# Patient Record
Sex: MALE | Race: BLACK OR AFRICAN AMERICAN | NOT HISPANIC OR LATINO | Employment: UNEMPLOYED | URBAN - METROPOLITAN AREA
[De-identification: names, ages, dates, MRNs, and addresses within clinical notes are randomized per-mention and may not be internally consistent; named-entity substitution may affect disease eponyms.]

---

## 2017-09-05 ENCOUNTER — OFFICE VISIT (OUTPATIENT)
Dept: OTOLARYNGOLOGY | Facility: CLINIC | Age: 62
End: 2017-09-05
Payer: MEDICAID

## 2017-09-05 VITALS — HEART RATE: 87 BPM | SYSTOLIC BLOOD PRESSURE: 145 MMHG | DIASTOLIC BLOOD PRESSURE: 86 MMHG | WEIGHT: 186.75 LBS

## 2017-09-05 DIAGNOSIS — J33.9 NASAL POLYPOSIS: Primary | ICD-10-CM

## 2017-09-05 PROCEDURE — 99203 OFFICE O/P NEW LOW 30 MIN: CPT | Mod: PBBFAC | Performed by: OTOLARYNGOLOGY

## 2017-09-05 PROCEDURE — 99999 PR PBB SHADOW E&M-NEW PATIENT-LVL III: CPT | Mod: PBBFAC,,, | Performed by: OTOLARYNGOLOGY

## 2017-09-05 PROCEDURE — 99204 OFFICE O/P NEW MOD 45 MIN: CPT | Mod: S$PBB,,, | Performed by: OTOLARYNGOLOGY

## 2017-09-05 RX ORDER — FLUTICASONE PROPIONATE 50 MCG
2 SPRAY, SUSPENSION (ML) NASAL DAILY
Qty: 2 BOTTLE | Refills: 3 | Status: SHIPPED | OUTPATIENT
Start: 2017-09-05 | End: 2017-12-04

## 2017-09-05 NOTE — PROGRESS NOTES
Chief Complaint   Patient presents with    Consult     trouble swallowing/ smelling/breating         62 y.o. male presents with longstanding history of loss of smell starting in 2006. Had a nasal surgery 8 years ago at [facility] for polyps. Was doing better but has noted increased nasal congestion and inability to breathe through his nose. He also has some coughing. No allergy to aspirin or asthma diagnosis. Currently on Flonase.      Review of Systems     Constitutional: Negative for fatigue and unexpected weight change.   HENT: per HPI.   Eyes: Negative for visual disturbance.   Respiratory: Negative for shortness of breath, hemoptysis  Cardiovascular: Negative for chest pain and palpitations.   Genitourinary: Negative for dysuria and difficulty urinating.   Musculoskeletal: Negative for decreased ROM, back pain.   Skin: Negative for rash, sunburn, itching.   Neurological: Negative for dizziness and seizures.   Hematological: Negative for adenopathy. Does not bruise/bleed easily.   Psychiatric/Behavioral: Negative for agitation. The patient is not nervous/anxious.   Endocrine: Negative for rapid weight loss/weight gain, heat/cold intolerance.     History reviewed. No pertinent past medical history.    History reviewed. No pertinent surgical history.    family history is not on file.    Pt  reports that he has never smoked. He has never used smokeless tobacco.    Review of patient's allergies indicates:  No Known Allergies     Physical Exam    Vitals:    09/05/17 1403   BP: (!) 145/86   Pulse: 87     There is no height or weight on file to calculate BMI.    Physical Exam   Constitutional: He is oriented to person, place, and time. He appears well-developed and well-nourished. No distress.   HENT:   Head: Normocephalic and atraumatic.   Right Ear: Hearing, tympanic membrane, external ear and ear canal normal. Tympanic membrane mobility is normal. No middle ear effusion. No decreased hearing is noted.   Left Ear: Hearing,  tympanic membrane, external ear and ear canal normal. Tympanic membrane mobility is normal.  No middle ear effusion. No decreased hearing is noted.   Nose: Nose normal. Right sinus exhibits no maxillary sinus tenderness and no frontal sinus tenderness. Left sinus exhibits no maxillary sinus tenderness and no frontal sinus tenderness.   Mouth/Throat: Oropharynx is clear and moist.   Bilateral nasal cavities with polypoid change extending posteriorly. No purulent fluid seen.   Eyes: Conjunctivae, EOM and lids are normal. Pupils are equal, round, and reactive to light. Right eye exhibits no discharge. Left eye exhibits no discharge.   Neck: Trachea normal, normal range of motion and phonation normal. Neck supple. No tracheal tenderness present. No tracheal deviation, no edema and no erythema present. No thyroid mass and no thyromegaly present.   Cardiovascular: Normal heart sounds.    Pulmonary/Chest: Breath sounds normal. No stridor.   Abdominal: Soft.   Lymphadenopathy:     He has no cervical adenopathy.   Neurological: He is alert and oriented to person, place, and time.   Skin: Skin is warm and dry. No rash noted. He is not diaphoretic. No erythema. No pallor.   Psychiatric: He has a normal mood and affect.   Nursing note and vitals reviewed.        Assessment     1. Nasal polyposis          Plan  In summary, Mr. Hansen is a 62 year old male with history of nasal polyposis. Discussed the nature of nasal polyps. Will obtain CT sinus and continue Flonase. Recommend he follow up with my colleague Dr. Botello for further evaluation once CT sinus complete.

## 2017-09-06 ENCOUNTER — HOSPITAL ENCOUNTER (OUTPATIENT)
Dept: RADIOLOGY | Facility: HOSPITAL | Age: 62
Discharge: HOME | End: 2017-09-06
Attending: OTOLARYNGOLOGY
Payer: MEDICAID

## 2017-09-06 DIAGNOSIS — J33.9 NASAL POLYPOSIS: ICD-10-CM

## 2017-09-06 PROCEDURE — 70486 CT MAXILLOFACIAL W/O DYE: CPT | Mod: 26,,, | Performed by: RADIOLOGY

## 2017-09-06 PROCEDURE — 70486 CT MAXILLOFACIAL W/O DYE: CPT | Mod: TC

## 2017-09-11 ENCOUNTER — HOSPITAL ENCOUNTER (OUTPATIENT)
Dept: CARDIOLOGY | Facility: CLINIC | Age: 62
Discharge: HOME | End: 2017-09-11
Payer: MEDICAID

## 2017-09-11 ENCOUNTER — HOSPITAL ENCOUNTER (OUTPATIENT)
Dept: RADIOLOGY | Facility: HOSPITAL | Age: 62
Discharge: HOME | End: 2017-09-11
Attending: OTOLARYNGOLOGY
Payer: MEDICAID

## 2017-09-11 ENCOUNTER — OFFICE VISIT (OUTPATIENT)
Dept: OTOLARYNGOLOGY | Facility: CLINIC | Age: 62
End: 2017-09-11
Payer: MEDICAID

## 2017-09-11 VITALS
TEMPERATURE: 97 F | WEIGHT: 187.81 LBS | DIASTOLIC BLOOD PRESSURE: 83 MMHG | HEART RATE: 111 BPM | SYSTOLIC BLOOD PRESSURE: 141 MMHG

## 2017-09-11 DIAGNOSIS — J35.8 TONSILLAR MASS: Primary | ICD-10-CM

## 2017-09-11 DIAGNOSIS — J35.8 TONSILLAR MASS: ICD-10-CM

## 2017-09-11 DIAGNOSIS — J33.9 NASAL POLYPOSIS: ICD-10-CM

## 2017-09-11 PROCEDURE — 71020 XR CHEST PA AND LATERAL: CPT | Mod: 26,,, | Performed by: RADIOLOGY

## 2017-09-11 PROCEDURE — 31575 DIAGNOSTIC LARYNGOSCOPY: CPT | Mod: PBBFAC | Performed by: OTOLARYNGOLOGY

## 2017-09-11 PROCEDURE — 93010 ELECTROCARDIOGRAM REPORT: CPT | Mod: S$PBB,,, | Performed by: INTERNAL MEDICINE

## 2017-09-11 PROCEDURE — 99214 OFFICE O/P EST MOD 30 MIN: CPT | Mod: 25,S$PBB,, | Performed by: OTOLARYNGOLOGY

## 2017-09-11 PROCEDURE — 99214 OFFICE O/P EST MOD 30 MIN: CPT | Mod: PBBFAC,25 | Performed by: OTOLARYNGOLOGY

## 2017-09-11 PROCEDURE — 99999 PR PBB SHADOW E&M-EST. PATIENT-LVL IV: CPT | Mod: PBBFAC,,, | Performed by: OTOLARYNGOLOGY

## 2017-09-11 PROCEDURE — 31575 DIAGNOSTIC LARYNGOSCOPY: CPT | Mod: S$PBB,,, | Performed by: OTOLARYNGOLOGY

## 2017-09-11 PROCEDURE — 71020 XR CHEST PA AND LATERAL: CPT | Mod: TC

## 2017-09-11 PROCEDURE — 93005 ELECTROCARDIOGRAM TRACING: CPT | Mod: PBBFAC | Performed by: INTERNAL MEDICINE

## 2017-09-11 RX ORDER — LIDOCAINE HYDROCHLORIDE 10 MG/ML
1 INJECTION, SOLUTION EPIDURAL; INFILTRATION; INTRACAUDAL; PERINEURAL ONCE
Status: CANCELLED | OUTPATIENT
Start: 2017-09-11 | End: 2017-09-11

## 2017-09-11 NOTE — PROGRESS NOTES
Chief Complaint   Patient presents with    post CT         62 y.o. male presents with longstanding history of loss of smell starting in 2006. Had a nasal surgery 8 years ago at [facility] for polyps. Was doing better but has noted increased nasal congestion and inability to breathe through his nose. He also has some coughing. No allergy to aspirin or asthma diagnosis. Currently on Flonase.     No new symptoms. CT sinus reveals recurrent nasal polyps, but also demonstrates a left tonsil mass. He denies any throat or lear pain. He does report occasional cough. He has never used tobacco.       Review of Systems     Constitutional: Negative for fatigue and unexpected weight change.   HENT: per HPI.   Eyes: Negative for visual disturbance.   Respiratory: Negative for shortness of breath, hemoptysis  Cardiovascular: Negative for chest pain and palpitations.   Genitourinary: Negative for dysuria and difficulty urinating.   Musculoskeletal: Negative for decreased ROM, back pain.   Skin: Negative for rash, sunburn, itching.   Neurological: Negative for dizziness and seizures.   Hematological: Negative for adenopathy. Does not bruise/bleed easily.   Psychiatric/Behavioral: Negative for agitation. The patient is not nervous/anxious.   Endocrine: Negative for rapid weight loss/weight gain, heat/cold intolerance.     History reviewed. No pertinent past medical history.    History reviewed. No pertinent surgical history.    family history is not on file.    Pt  reports that he has never smoked. He has never used smokeless tobacco.    Review of patient's allergies indicates:  No Known Allergies     Physical Exam    Vitals:    09/11/17 0944   BP: (!) 141/83   Pulse: (!) 111   Temp: 96.9 °F (36.1 °C)     There is no height or weight on file to calculate BMI.    Physical Exam   Constitutional: He is oriented to person, place, and time. He appears well-developed and well-nourished. No distress.   HENT:   Head: Normocephalic and atraumatic.    Right Ear: Hearing, tympanic membrane, external ear and ear canal normal. Tympanic membrane mobility is normal. No middle ear effusion. No decreased hearing is noted.   Left Ear: Hearing, tympanic membrane, external ear and ear canal normal. Tympanic membrane mobility is normal.  No middle ear effusion. No decreased hearing is noted.   Nose: Nose normal. Right sinus exhibits no maxillary sinus tenderness and no frontal sinus tenderness. Left sinus exhibits no maxillary sinus tenderness and no frontal sinus tenderness.   Mouth/Throat: Uvula is midline, oropharynx is clear and moist and mucous membranes are normal.       Bilateral nasal cavities with polypoid change extending posteriorly. No purulent fluid seen.   Eyes: Conjunctivae, EOM and lids are normal. Pupils are equal, round, and reactive to light. Right eye exhibits no discharge. Left eye exhibits no discharge.   Neck: Trachea normal, normal range of motion and phonation normal. Neck supple. No tracheal tenderness present. No tracheal deviation, no edema and no erythema present. No thyroid mass and no thyromegaly present.   Cardiovascular: Normal heart sounds.    Pulmonary/Chest: Breath sounds normal. No stridor.   Abdominal: Soft.   Lymphadenopathy:     He has no cervical adenopathy.   Neurological: He is alert and oriented to person, place, and time.   Skin: Skin is warm and dry. No rash noted. He is not diaphoretic. No erythema. No pallor.   Psychiatric: He has a normal mood and affect.   Nursing note and vitals reviewed.    Procedure: Flexible laryngoscopy  In order to fully examine the upper aerodigestive tract, including the larynx, in a patient with a hyperactive gag reflex, flexible endoscopy is required.  After explaining the procedure and obtaining verbal consent, a timeout was performed with the patient's participation according to the universal protocol. Both nasal cavities were anesthetized with 4% Xylocaine spray mixed with Matthias-Synephrine. The  flexible laryngoscope was inserted into the nasal cavity and advanced to visualize the nasal cavity, nasopharynx, the posterior oropharynx, hypopharynx, and the endolarynx with the above findings noted. The scope was removed and the procedure terminated. The patient tolerated this procedure well without apparent complication.     Nasopharynx - the torus is clear. There are no lesions of the posterior wall.   Oropharynx - no lesions of the tongue base. There is fullness of the left tonsil.  Hypopharynx - there are no lesions of the pyriform sinuses or postcricoid region    Larynx - there are no lesions of the supraglottic or glottic larynx. Vocal fold mobility is normal with complete closure.     Studies reviewed:  CT Sinus 9/6/17-  Impression         1) Extensive opacification of the nasal sinuses with polypoid opacity extending into the nasal cavity as described above. 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    2) Postsurgical changes of functional endoscopic sinus surgery. The left medial antrostomy is narrowed but patent. Right is completely opacified.    3) 3.1 cm soft tissue mass at the level of the left palatine tonsil, incompletely visualized and not well characterized on this noncontrasted study, but concerning for malignancy. Further evaluation with direct visualization is advised and further imaging with contrast enhanced CT if warranted.       Assessment     1. Tonsillar mass    2. Nasal polyposis          Plan  In summary, Mr. Hansen is a 62 year old male with history of nasal polyposis. Recent CT sinus with evidence of recurrent disease as well as left tonsil mass. Will obtain dedicated CT neck for better assessment. May be vascular in nature given its appearance. Also discussed possible need for biopsy in the OR depending on CT results to rule out malignancy. Risks, benefits, and alternatives went over with the patient of DL, Bx, possible  tonsillectomy. Risks include, but are not limited to, pain, bleeding, infection, tongue injury and numbness, as well as injury to the structures of the oral cavity and upper aerodigestive tract. All questions were answered and he is in agreement with the plan.

## 2017-09-12 ENCOUNTER — HOSPITAL ENCOUNTER (OUTPATIENT)
Dept: RADIOLOGY | Facility: HOSPITAL | Age: 62
Discharge: HOME | End: 2017-09-12
Attending: OTOLARYNGOLOGY
Payer: MEDICAID

## 2017-09-12 DIAGNOSIS — J35.8 TONSILLAR MASS: ICD-10-CM

## 2017-09-12 PROCEDURE — 70491 CT SOFT TISSUE NECK W/DYE: CPT | Mod: 26,,, | Performed by: RADIOLOGY

## 2017-09-12 PROCEDURE — 25500020 PHARM REV CODE 255: Performed by: OTOLARYNGOLOGY

## 2017-09-12 PROCEDURE — 70491 CT SOFT TISSUE NECK W/DYE: CPT | Mod: TC

## 2017-09-12 RX ADMIN — IOHEXOL 75 ML: 350 INJECTION, SOLUTION INTRAVENOUS at 05:09

## 2017-09-14 ENCOUNTER — TELEPHONE (OUTPATIENT)
Dept: OTOLARYNGOLOGY | Facility: CLINIC | Age: 62
End: 2017-09-14

## 2017-09-14 ENCOUNTER — ANESTHESIA EVENT (OUTPATIENT)
Dept: SURGERY | Facility: HOSPITAL | Age: 62
End: 2017-09-14
Payer: MEDICAID

## 2017-09-14 NOTE — ANESTHESIA PREPROCEDURE EVALUATION
09/14/2017  [facility]  Anesthesia Pre-Operative Evaluation         Patient Name: Saba Hansen  YOB: 1955  MRN: [id number]    SUBJECTIVE:     Pre-operative evaluation for Procedure(s) (LRB):  LARYNGOSCOPY BRONCHOSCOPY-DIRECT (N/A)  TONSILLECTOMY (Bilateral)     09/14/2017    Saba Hansen is a 62 y.o. male w/ tonsilar mass who presents for the above procedure.  PMH: patient new to [facility].  Denies medical problems other than nasal congestion and mild asthma with no recent episodes. No inhaler use.  Prev airway: None documented.    There is no problem list on file for this patient.    Review of patient's allergies indicates:  No Known Allergies    Current Inpatient Medications:      No current facility-administered medications on file prior to encounter.      Current Outpatient Prescriptions on File Prior to Encounter   Medication Sig Dispense Refill    fluticasone (FLONASE) 50 mcg/actuation nasal spray 2 sprays by Each Nare route once daily. 2 Bottle 3       History reviewed. No pertinent surgical history.    Social History     Social History    Marital status: Single     Spouse name: N/A    Number of children: N/A    Years of education: N/A     Occupational History    Not on file.     Social History Main Topics    Smoking status: Never Smoker    Smokeless tobacco: Never Used    Alcohol use Not on file    Drug use: Unknown    Sexual activity: Not on file     Other Topics Concern    Not on file     Social History Narrative    No narrative on file       OBJECTIVE:     Vital Signs Range (Last 24H):         CBC:   No results for input(s): WBC, RBC, HGB, HCT, PLT, MCV, MCH, MCHC in the last 72 hours.    CMP: No results for input(s): NA, K, CL, CO2, BUN, CREATININE, GLU, MG, PHOS, CALCIUM, ALBUMIN, PROT, ALKPHOS, ALT, AST, BILITOT in the last 72  09/30/21        [address] [place] [address] [place] [id number]      Dear Yulissa Hernandez records indicate that you have outstanding lab work and or testing that was ordered for you and has not yet been completed:  Orders Placed This Encounter hours.    INR:  No results for input(s): INR, PROTIME, APTT in the last 72 hours.    Invalid input(s): PT    Diagnostic Studies:   CT soft tissue neck 9/12/17:  Ill-defined 2.9 cm area of soft tissue lesion in the left tonsillar area, similar to CT 09/06/2017, concerning for squamous cell carcinoma with an ipsilateral 1.3 cm level III cervical node, could indicate regional spread of tumor.     Extensive sinus disease compatible with allergic fungal sinusitis and associated sinonasal polyposis as seen on recent CT sinus study.    EKG:   Normal sinus rhythm  Right atrial enlargement  Borderline Abnormal ECG  No previous ECGs available  Confirmed by Nikkie Montemayor MD ([id number]) on 9/11/2017 3:41:39 PM    ASSESSMENT/PLAN:         Anesthesia Evaluation    I have reviewed the Patient Summary Reports.    I have reviewed the Nursing Notes.   I have reviewed the Medications.     Review of Systems  Anesthesia Hx:  No problems with previous Anesthesia Denies Hx of Anesthetic complications  Neg history of prior surgery. Denies Family Hx of Anesthesia complications.   Denies Personal Hx of Anesthesia complications.   Social:  Non-Smoker    Hematology/Oncology:  Hematology Normal   Oncology Normal     EENT/Dental:   chronic allergic rhinitis Tonsillar mass   Cardiovascular:  Cardiovascular Normal  ECG has been reviewed.    Pulmonary:   Asthma mild    Renal/:  Renal/ Normal     Hepatic/GI:  Hepatic/GI Normal    Musculoskeletal:  Musculoskeletal Normal    Neurological:  Neurology Normal    Endocrine:  Endocrine Normal    Dermatological:  Skin Normal    Psych:  Psychiatric Normal           Physical Exam  General:  Well nourished    Airway/Jaw/Neck:  Airway Findings: Mouth Opening: Normal Tongue: Normal  General Airway Assessment: Good  Mallampati: II  Improves to II with phonation.  TM Distance: Normal, at least 6 cm  Jaw/Neck Findings:  Neck ROM: Normal ROM      Dental:  Dental Findings: Periodontal disease, Mild     Chest/Lungs:  Chest/Lungs Findings: Clear to auscultation, Normal Respiratory Rate     Heart/Vascular:  Heart Findings: Rate: Normal  Rhythm: Regular Rhythm  Sounds: Normal        Mental Status:  Mental Status Findings:  Cooperative, Alert and Oriented         Anesthesia Plan  Type of Anesthesia, risks & benefits discussed:  Anesthesia Type:  general  Patient's Preference:   Intra-op Monitoring Plan: standard ASA monitors  Intra-op Monitoring Plan Comments:   Post Op Pain Control Plan: multimodal analgesia  Post Op Pain Control Plan Comments:   Induction:   IV  Beta Blocker:  Patient is not currently on a Beta-Blocker (No further documentation required).       Informed Consent: Patient understands risks and agrees with Anesthesia plan.  Questions answered. Anesthesia consent signed with patient.  ASA Score: 2     Day of Surgery Review of History & Physical:    H&P update referred to the surgeon.         Ready For Surgery From Anesthesia Perspective.

## 2017-09-15 ENCOUNTER — ANESTHESIA (OUTPATIENT)
Dept: SURGERY | Facility: HOSPITAL | Age: 62
End: 2017-09-15
Payer: MEDICAID

## 2017-09-15 ENCOUNTER — HOSPITAL ENCOUNTER (OUTPATIENT)
Facility: HOSPITAL | Age: 62
Discharge: HOME | End: 2017-09-15
Attending: OTOLARYNGOLOGY | Admitting: OTOLARYNGOLOGY
Payer: MEDICAID

## 2017-09-15 VITALS
HEART RATE: 75 BPM | DIASTOLIC BLOOD PRESSURE: 80 MMHG | HEIGHT: 73 IN | OXYGEN SATURATION: 96 % | RESPIRATION RATE: 17 BRPM | WEIGHT: 186 LBS | SYSTOLIC BLOOD PRESSURE: 117 MMHG | TEMPERATURE: 98 F | BODY MASS INDEX: 24.65 KG/M2

## 2017-09-15 DIAGNOSIS — J35.8 TONSILLAR MASS: Primary | ICD-10-CM

## 2017-09-15 PROCEDURE — 94640 AIRWAY INHALATION TREATMENT: CPT

## 2017-09-15 PROCEDURE — 27000221 HC OXYGEN, UP TO 24 HOURS

## 2017-09-15 PROCEDURE — 37000009 HC ANESTHESIA EA ADD 15 MINS: Performed by: OTOLARYNGOLOGY

## 2017-09-15 PROCEDURE — 31535 LARYNGOSCOPY W/BIOPSY: CPT | Mod: ,,, | Performed by: OTOLARYNGOLOGY

## 2017-09-15 PROCEDURE — 37000008 HC ANESTHESIA 1ST 15 MINUTES: Performed by: OTOLARYNGOLOGY

## 2017-09-15 PROCEDURE — 88184 FLOWCYTOMETRY/ TC 1 MARKER: CPT | Performed by: PATHOLOGY

## 2017-09-15 PROCEDURE — 88305 TISSUE EXAM BY PATHOLOGIST: CPT | Performed by: PATHOLOGY

## 2017-09-15 PROCEDURE — 36000708 HC OR TIME LEV III 1ST 15 MIN: Performed by: OTOLARYNGOLOGY

## 2017-09-15 PROCEDURE — 36000709 HC OR TIME LEV III EA ADD 15 MIN: Performed by: OTOLARYNGOLOGY

## 2017-09-15 PROCEDURE — 71000033 HC RECOVERY, INTIAL HOUR: Performed by: OTOLARYNGOLOGY

## 2017-09-15 PROCEDURE — 88305 TISSUE EXAM BY PATHOLOGIST: CPT | Mod: 26,,, | Performed by: PATHOLOGY

## 2017-09-15 PROCEDURE — 63600175 PHARM REV CODE 636 W HCPCS: Performed by: STUDENT IN AN ORGANIZED HEALTH CARE EDUCATION/TRAINING PROGRAM

## 2017-09-15 PROCEDURE — 88189 FLOWCYTOMETRY/READ 16 & >: CPT | Mod: ,,, | Performed by: PATHOLOGY

## 2017-09-15 PROCEDURE — 25000003 PHARM REV CODE 250: Performed by: STUDENT IN AN ORGANIZED HEALTH CARE EDUCATION/TRAINING PROGRAM

## 2017-09-15 PROCEDURE — 25000003 PHARM REV CODE 250: Performed by: OTOLARYNGOLOGY

## 2017-09-15 PROCEDURE — 71000015 HC POSTOP RECOV 1ST HR: Performed by: OTOLARYNGOLOGY

## 2017-09-15 PROCEDURE — D9220A PRA ANESTHESIA: Mod: ,,, | Performed by: ANESTHESIOLOGY

## 2017-09-15 PROCEDURE — 25000242 PHARM REV CODE 250 ALT 637 W/ HCPCS

## 2017-09-15 PROCEDURE — 25000003 PHARM REV CODE 250

## 2017-09-15 PROCEDURE — 71000039 HC RECOVERY, EACH ADD'L HOUR: Performed by: OTOLARYNGOLOGY

## 2017-09-15 PROCEDURE — 88185 FLOWCYTOMETRY/TC ADD-ON: CPT | Mod: 59 | Performed by: PATHOLOGY

## 2017-09-15 RX ORDER — SUCCINYLCHOLINE CHLORIDE 20 MG/ML
INJECTION INTRAMUSCULAR; INTRAVENOUS
Status: DISCONTINUED | OUTPATIENT
Start: 2017-09-15 | End: 2017-09-15

## 2017-09-15 RX ORDER — NEOSTIGMINE METHYLSULFATE 1 MG/ML
INJECTION, SOLUTION INTRAVENOUS
Status: DISCONTINUED | OUTPATIENT
Start: 2017-09-15 | End: 2017-09-15

## 2017-09-15 RX ORDER — OXYMETAZOLINE HCL 0.05 %
SPRAY, NON-AEROSOL (ML) NASAL
Status: DISCONTINUED | OUTPATIENT
Start: 2017-09-15 | End: 2017-09-15 | Stop reason: HOSPADM

## 2017-09-15 RX ORDER — SODIUM CHLORIDE 9 MG/ML
INJECTION, SOLUTION INTRAVENOUS CONTINUOUS PRN
Status: DISCONTINUED | OUTPATIENT
Start: 2017-09-15 | End: 2017-09-15

## 2017-09-15 RX ORDER — PHENYLEPHRINE HYDROCHLORIDE 10 MG/ML
INJECTION INTRAVENOUS
Status: DISCONTINUED | OUTPATIENT
Start: 2017-09-15 | End: 2017-09-15

## 2017-09-15 RX ORDER — OXYCODONE AND ACETAMINOPHEN 5; 325 MG/1; MG/1
1 TABLET ORAL EVERY 4 HOURS PRN
Status: DISCONTINUED | OUTPATIENT
Start: 2017-09-15 | End: 2017-09-15 | Stop reason: HOSPADM

## 2017-09-15 RX ORDER — HYDROMORPHONE HYDROCHLORIDE 1 MG/ML
0.2 INJECTION, SOLUTION INTRAMUSCULAR; INTRAVENOUS; SUBCUTANEOUS EVERY 5 MIN PRN
Status: DISCONTINUED | OUTPATIENT
Start: 2017-09-15 | End: 2017-09-15 | Stop reason: HOSPADM

## 2017-09-15 RX ORDER — LIDOCAINE HCL/PF 100 MG/5ML
SYRINGE (ML) INTRAVENOUS
Status: DISCONTINUED | OUTPATIENT
Start: 2017-09-15 | End: 2017-09-15

## 2017-09-15 RX ORDER — LIDOCAINE HYDROCHLORIDE 10 MG/ML
1 INJECTION, SOLUTION EPIDURAL; INFILTRATION; INTRACAUDAL; PERINEURAL ONCE
Status: COMPLETED | OUTPATIENT
Start: 2017-09-15 | End: 2017-09-15

## 2017-09-15 RX ORDER — ONDANSETRON 2 MG/ML
4 INJECTION INTRAMUSCULAR; INTRAVENOUS DAILY PRN
Status: DISCONTINUED | OUTPATIENT
Start: 2017-09-15 | End: 2017-09-15 | Stop reason: HOSPADM

## 2017-09-15 RX ORDER — ONDANSETRON 8 MG/1
8 TABLET, ORALLY DISINTEGRATING ORAL EVERY 12 HOURS PRN
Qty: 20 TABLET | Refills: 0 | Status: ON HOLD | OUTPATIENT
Start: 2017-09-15 | End: 2017-10-13 | Stop reason: HOSPADM

## 2017-09-15 RX ORDER — ALBUTEROL SULFATE 0.83 MG/ML
2.5 SOLUTION RESPIRATORY (INHALATION) ONCE
Status: COMPLETED | OUTPATIENT
Start: 2017-09-15 | End: 2017-09-15

## 2017-09-15 RX ORDER — FENTANYL CITRATE 50 UG/ML
INJECTION, SOLUTION INTRAMUSCULAR; INTRAVENOUS
Status: DISCONTINUED | OUTPATIENT
Start: 2017-09-15 | End: 2017-09-15

## 2017-09-15 RX ORDER — ALBUTEROL SULFATE 0.83 MG/ML
SOLUTION RESPIRATORY (INHALATION)
Status: COMPLETED
Start: 2017-09-15 | End: 2017-09-15

## 2017-09-15 RX ORDER — SODIUM CHLORIDE 0.9 % (FLUSH) 0.9 %
3 SYRINGE (ML) INJECTION
Status: DISCONTINUED | OUTPATIENT
Start: 2017-09-15 | End: 2017-09-15 | Stop reason: HOSPADM

## 2017-09-15 RX ORDER — OXYCODONE AND ACETAMINOPHEN 10; 325 MG/1; MG/1
1 TABLET ORAL EVERY 6 HOURS PRN
Qty: 30 TABLET | Refills: 0 | Status: ON HOLD | OUTPATIENT
Start: 2017-09-15 | End: 2017-10-13 | Stop reason: HOSPADM

## 2017-09-15 RX ORDER — ROCURONIUM BROMIDE 10 MG/ML
INJECTION, SOLUTION INTRAVENOUS
Status: DISCONTINUED | OUTPATIENT
Start: 2017-09-15 | End: 2017-09-15

## 2017-09-15 RX ORDER — PROPOFOL 10 MG/ML
VIAL (ML) INTRAVENOUS
Status: DISCONTINUED | OUTPATIENT
Start: 2017-09-15 | End: 2017-09-15

## 2017-09-15 RX ORDER — DEXMEDETOMIDINE HYDROCHLORIDE 100 UG/ML
INJECTION, SOLUTION INTRAVENOUS
Status: DISCONTINUED | OUTPATIENT
Start: 2017-09-15 | End: 2017-09-15

## 2017-09-15 RX ORDER — IPRATROPIUM BROMIDE AND ALBUTEROL SULFATE 2.5; .5 MG/3ML; MG/3ML
3 SOLUTION RESPIRATORY (INHALATION) ONCE
Status: DISCONTINUED | OUTPATIENT
Start: 2017-09-15 | End: 2017-09-15

## 2017-09-15 RX ORDER — ONDANSETRON 2 MG/ML
INJECTION INTRAMUSCULAR; INTRAVENOUS
Status: DISCONTINUED | OUTPATIENT
Start: 2017-09-15 | End: 2017-09-15

## 2017-09-15 RX ORDER — GLYCOPYRROLATE 0.2 MG/ML
INJECTION INTRAMUSCULAR; INTRAVENOUS
Status: DISCONTINUED | OUTPATIENT
Start: 2017-09-15 | End: 2017-09-15

## 2017-09-15 RX ADMIN — PHENYLEPHRINE HYDROCHLORIDE 100 MCG: 10 INJECTION INTRAVENOUS at 10:09

## 2017-09-15 RX ADMIN — GLYCOPYRROLATE 0.6 MG: 0.2 INJECTION, SOLUTION INTRAMUSCULAR; INTRAVENOUS at 10:09

## 2017-09-15 RX ADMIN — ONDANSETRON 4 MG: 2 INJECTION INTRAMUSCULAR; INTRAVENOUS at 12:09

## 2017-09-15 RX ADMIN — FENTANYL CITRATE 100 MCG: 50 INJECTION, SOLUTION INTRAMUSCULAR; INTRAVENOUS at 09:09

## 2017-09-15 RX ADMIN — PHENYLEPHRINE HYDROCHLORIDE 100 MCG: 10 INJECTION INTRAVENOUS at 09:09

## 2017-09-15 RX ADMIN — HYDROMORPHONE HYDROCHLORIDE 0.2 MG: 1 INJECTION, SOLUTION INTRAMUSCULAR; INTRAVENOUS; SUBCUTANEOUS at 11:09

## 2017-09-15 RX ADMIN — ALBUTEROL SULFATE 2.5 MG: 0.83 SOLUTION RESPIRATORY (INHALATION) at 11:09

## 2017-09-15 RX ADMIN — OXYCODONE HYDROCHLORIDE AND ACETAMINOPHEN 1 TABLET: 5; 325 TABLET ORAL at 11:09

## 2017-09-15 RX ADMIN — ROCURONIUM BROMIDE 5 MG: 10 INJECTION, SOLUTION INTRAVENOUS at 09:09

## 2017-09-15 RX ADMIN — ONDANSETRON 4 MG: 2 INJECTION INTRAMUSCULAR; INTRAVENOUS at 10:09

## 2017-09-15 RX ADMIN — DEXMEDETOMIDINE HYDROCHLORIDE 21 MCG: 100 INJECTION, SOLUTION, CONCENTRATE INTRAVENOUS at 09:09

## 2017-09-15 RX ADMIN — ALBUTEROL SULFATE 2.5 MG: 2.5 SOLUTION RESPIRATORY (INHALATION) at 11:09

## 2017-09-15 RX ADMIN — LIDOCAINE HYDROCHLORIDE 0.1 MG: 10 INJECTION, SOLUTION EPIDURAL; INFILTRATION; INTRACAUDAL; PERINEURAL at 08:09

## 2017-09-15 RX ADMIN — SODIUM CHLORIDE: 0.9 INJECTION, SOLUTION INTRAVENOUS at 08:09

## 2017-09-15 RX ADMIN — NEOSTIGMINE METHYLSULFATE 4 MG: 1 INJECTION INTRAVENOUS at 10:09

## 2017-09-15 RX ADMIN — PHENYLEPHRINE HYDROCHLORIDE 50 MCG: 10 INJECTION INTRAVENOUS at 10:09

## 2017-09-15 RX ADMIN — ROCURONIUM BROMIDE 20 MG: 10 INJECTION, SOLUTION INTRAVENOUS at 09:09

## 2017-09-15 RX ADMIN — PROPOFOL 20 MG: 10 INJECTION, EMULSION INTRAVENOUS at 10:09

## 2017-09-15 RX ADMIN — SUCCINYLCHOLINE CHLORIDE 160 MG: 20 INJECTION, SOLUTION INTRAMUSCULAR; INTRAVENOUS at 09:09

## 2017-09-15 RX ADMIN — PROPOFOL 130 MG: 10 INJECTION, EMULSION INTRAVENOUS at 09:09

## 2017-09-15 RX ADMIN — SODIUM CHLORIDE, SODIUM GLUCONATE, SODIUM ACETATE, POTASSIUM CHLORIDE, MAGNESIUM CHLORIDE, SODIUM PHOSPHATE, DIBASIC, AND POTASSIUM PHOSPHATE: .53; .5; .37; .037; .03; .012; .00082 INJECTION, SOLUTION INTRAVENOUS at 10:09

## 2017-09-15 RX ADMIN — LIDOCAINE HYDROCHLORIDE 50 MG: 20 INJECTION, SOLUTION INTRAVENOUS at 09:09

## 2017-09-15 NOTE — BRIEF OP NOTE
[facility]  Brief Operative Note     SUMMARY     Surgery Date: 9/15/2017     Surgeon(s) and Role:     * Fern Garcia MD - Primary    Assisting Surgeon: None    Pre-op Diagnosis:  Tonsillar mass [R22.0]    Post-op Diagnosis:  Post-Op Diagnosis Codes:     * Tonsillar mass [R22.0]    Procedure(s) (LRB):  LARYNGOSCOPY BRONCHOSCOPY-DIRECT (N/A)  BIOPSY-TONSIL (Left)    Anesthesia: General    Description of the findings of the procedure: Left oropharyngeal mass    Findings/Key Components: Left oropharyngeal mass biopsy    Estimated Blood Loss: * No values recorded between 9/15/2017  9:37 AM and 9/15/2017 10:33 AM *         Specimens:   Specimen (12h ago through future)    Start     Ordered    09/15/17 1027  Specimen to Pathology - Surgery  Once     Comments:  Specimen to patho1) left tonsil mass (FRESH and Perm)2) left tonsil mass (perm)      09/15/17 1027          Discharge Note    SUMMARY     Admit Date: 9/15/2017    Discharge Date and Time:  09/15/2017 10:43 AM    Hospital Course (synopsis of major diagnoses, care, treatment, and services provided during the course of the hospital stay): To OR for DL and Left tonsil biopsy. He tolerated the surgery well and was discharged home once stable per Anesthesia criteria.     Final Diagnosis: Post-Op Diagnosis Codes:     * Tonsillar mass [R22.0]    Disposition: Home or Self Care    Follow Up/Patient Instructions:     Medications:  Reconciled Home Medications:   Current Discharge Medication List      START taking these medications    Details   ondansetron (ZOFRAN-ODT) 8 MG TbDL Take 1 tablet (8 mg total) by mouth every 12 (twelve) hours as needed.  Qty: 20 tablet, Refills: 0      oxycodone-acetaminophen (PERCOCET)  mg per tablet Take 1 tablet by mouth every 6 (six) hours as needed for Pain.  Qty: 30 tablet, Refills: 0         CONTINUE these medications which have NOT CHANGED    Details   fluticasone (FLONASE) 50 mcg/actuation nasal spray 2  sprays by Each Nare route once daily.  Qty: 2 Bottle, Refills: 3             Discharge Procedure Orders  Diet general     Activity as tolerated   Order Comments: Light activity only. No heavy lifting, straining, stooping, exercising for 1 week     Call MD for:  temperature >100.4     Call MD for:  persistent nausea and vomiting or diarrhea     Call MD for:  redness, tenderness, or signs of infection (pain, swelling, redness, odor or green/yellow discharge around incision site)     Call MD for:  severe uncontrolled pain     Call MD for:  difficulty breathing or increased cough     Call MD for:  severe persistent headache     Call MD for:  worsening rash     Call MD for:  persistent dizziness, light-headedness, or visual disturbances     Call MD for:  increased confusion or weakness     No dressing needed       Follow-up Information     Arianna Sanchez NP. Schedule an appointment as soon as possible for a visit in 1 week.    Specialty:  Otolaryngology  Why:  For wound re-check  Contact information:  [address]  [facility] [address]  [phone]

## 2017-09-15 NOTE — ANESTHESIA POSTPROCEDURE EVALUATION
"Anesthesia Post Evaluation    Patient: Saba Hansen    Procedure(s) Performed: Procedure(s) (LRB):  LARYNGOSCOPY BRONCHOSCOPY-DIRECT (N/A)  BIOPSY-TONSIL (Left)    Final Anesthesia Type: general  Patient location during evaluation: PACU  Patient participation: Yes- Able to Participate  Level of consciousness: awake and alert and oriented  Post-procedure vital signs: reviewed and stable  Pain management: adequate  Airway patency: patent  PONV status at discharge: No PONV  Anesthetic complications: no      Cardiovascular status: hemodynamically stable  Respiratory status: unassisted  Hydration status: euvolemic  Follow-up not needed.        Visit Vitals  /80   Pulse 75   Temp 36.7 °C (98.1 °F) (Temporal)   Resp 17   Ht 6' 1" (1.854 m)   Wt 84.4 kg (186 lb)   SpO2 96%   BMI 24.54 kg/m²       Pain/Gregory Score: Pain Assessment Performed: Yes (9/15/2017 12:40 PM)  Presence of Pain: denies (9/15/2017 12:40 PM)  Pain Rating Prior to Med Admin: 6 (9/15/2017 11:38 AM)  Pain Rating Post Med Admin: 3 (9/15/2017 12:20 PM)  Gregory Score: 10 (9/15/2017 12:40 PM)  Modified Gregory Score: 20 (9/15/2017 12:40 PM)      "

## 2017-09-15 NOTE — PLAN OF CARE
Pt is AAOx4. VSS. NAD. IV discontinued. Pain tolerable, nausea subsided. Discharge instructions given, verbalized understanding. Discharged home with friend.

## 2017-09-15 NOTE — INTERVAL H&P NOTE
The patient has been examined and the H&P has been reviewed:    I concur with the findings and no changes have occurred since H&P was written.    Anesthesia/Surgery risks, benefits and alternative options discussed and understood by patient/family.          Active Hospital Problems    Diagnosis  POA    Tonsillar mass [R22.0]  Yes      Resolved Hospital Problems    Diagnosis Date Resolved POA   No resolved problems to display.

## 2017-09-15 NOTE — DISCHARGE INSTRUCTIONS
Direct Laryngoscopy with Bronchoscopy    Laryngoscopy and bronchoscopy are 2 procedures that may be done together. These allow the healthcare provider to see inside the air passages in the throat and lungs. A laryngoscopy looks at the throat and vocal cords. Bronchoscopy looks at the trachea (windpipe) and lungs. These procedures can be used to diagnose and treat certain problems. They can also be used to remove stuck objects. A tissue sample may be taken for testing (biopsy). And certain problems, like cysts or scarring, can be treated. Your healthcare provider will tell you more about your procedure based on why it is being done. This sheet gives you general information about what to expect.  Preparing for the procedure  Prepare for the procedure as you have been instructed. Be sure to tell your healthcare provider about all medicines you take. This includes over-the-counter drugs. It also includes herbs and other supplements. You may need to stop taking some or all of them before surgery. Your healthcare provider will tell you what to stop. Also, follow any directions youre given for not eating or drinking before surgery.  The day of the procedure  The procedure takes 30 to 60 minutes. Before the procedure begins:  · An IV line is put into a vein in your arm or hand. This line delivers fluids and medicines.  · To keep you free of pain, you will be given anesthesia. This may be sedation, which makes you relaxed and drowsy. Local anesthesia may also be injected or sprayed into your throat to numb it. If you are in the hospital, you may be given general anesthesia. This puts you in a state like deep sleep through the procedure.  During the procedure  Here is what to expect during the procedure:  · A tube with a light and a camera called a scope is used. The tube may be flexible or rigid. If a flexible scope is used, it is passed through your nostril. If the scope is rigid, it is put into your mouth and passed  down into the throat.  · The scope is moved through the air passages to the lungs. The scope sends live images from inside the air passages to a video screen. This lets the healthcare provider examine problems more closely.  · If needed, a biopsy is done using small tools put through the scope.  · If a growth is found, tools (including a laser) can be put through the scope to remove it.  After the procedure  You will be taken to a room to recover from the anesthesia. You will receive pain medicine. Your throat may feel numb or scratchy. Swallowing may feel strange at first. This will improve within a few hours. When you are released to go home, have an adult family member or friend ready to drive you.  Recovering at home  Once home, follow any instructions you have been given. These include:  · Take pain medicine as directed.  · Do not eat or drink until swallowing returns to normal. As soon as you can swallow comfortably, drink plenty of water.  · Use throat lozenges as advised by your healthcare provider to help ease throat soreness.  · Rest your voice as instructed by your healthcare provider.  When to call your healthcare provider  After you get home, call the healthcare provider if you have any of the following:  · Chest pain or trouble breathing (call 911 or other emergency service)  · Fever of 100.4°F (38°C) or higher, or as directed by your healthcare provider  · Trouble swallowing doesnt improve or gets worse  · Pain that does not go away even after taking pain medicine  · Severely hoarse voice  · Severe nausea or vomiting  · Bloody vomit  · Cough that brings up more than tiny specks of blood   Follow-up  Within a few weeks, you will receive test results. Your healthcare provider will discuss these with you on the phone or during a follow-up visit. Depending on what was found, you may need further evaluation and treatment.  Risks and possible complications  Risks of this procedure  include:  · Bleeding  · Infection  · Swelling of the throat  · Nosebleed (if the scope is passed through the nostril)  · Gagging  · Vomiting  · Cuts in the mouth, nose, or throat  · Injury to the teeth  · Vocal cord injury  · Breathing problems  · Pneumothorax (collapsed lung)  · Perforation of the pharynx  · Risks of anesthesia    Date Last Reviewed: 6/11/2015  © 6575-3738 [employer]. [address] [place] [address], [place] [address]. All rights reserved. This information is not intended as a substitute for professional medical care. Always follow your healthcare professional's instructions.

## 2017-09-15 NOTE — BRIEF OP NOTE
[facility]  Brief Operative Note     SUMMARY     Surgery Date: 9/15/2017     Surgeon(s) and Role:     * Fern Garcia MD - Primary    Assisting Surgeon: None    Pre-op Diagnosis:  Tonsillar mass [R22.0]    Post-op Diagnosis:  Post-Op Diagnosis Codes:     * Tonsillar mass [R22.0]    Procedure(s) (LRB):  LARYNGOSCOPY BRONCHOSCOPY-DIRECT (N/A)  BIOPSY-TONSIL (Left)    Anesthesia: General    Description of the findings of the procedure: see op note    Findings/Key Components: see op note    Estimated Blood Loss: * No values recorded between 9/15/2017  9:37 AM and 9/15/2017 10:33 AM *         Specimens:   Specimen (12h ago through future)    Start     Ordered    09/15/17 1027  Specimen to Pathology - Surgery  Once     Comments:  Specimen to patho1) left tonsil mass (FRESH and Perm)2) left tonsil mass (perm)      09/15/17 1027          Discharge Note    SUMMARY     Admit Date: 9/15/2017    Discharge Date and Time:  09/15/2017 10:39 AM    Hospital Course (synopsis of major diagnoses, care, treatment, and services provided during the course of the hospital stay): Following completion of an electively scheduled procedure, the patient was transferred to the PACu for postoperative monitoring.   He hospital course was uneventful and noted for adequate pain control and PO intake following surgery.  He is discharged home in good condition and will follow-up with Dr. Garcia as scheduled.          Final Diagnosis: Post-Op Diagnosis Codes:     * Tonsillar mass [R22.0]    Disposition: Home or Self Care    Follow Up/Patient Instructions:     Medications:  Reconciled Home Medications:   Current Discharge Medication List      START taking these medications    Details   ondansetron (ZOFRAN-ODT) 8 MG TbDL Take 1 tablet (8 mg total) by mouth every 12 (twelve) hours as needed.  Qty: 20 tablet, Refills: 0      oxycodone-acetaminophen (PERCOCET)  mg per tablet Take 1 tablet by mouth every 6 (six) hours as  needed for Pain.  Qty: 30 tablet, Refills: 0         CONTINUE these medications which have NOT CHANGED    Details   fluticasone (FLONASE) 50 mcg/actuation nasal spray 2 sprays by Each Nare route once daily.  Qty: 2 Bottle, Refills: 3             Discharge Procedure Orders  Diet general     Activity as tolerated   Order Comments: Light activity only. No heavy lifting, straining, stooping, exercising for 1 week     Call MD for:  temperature >100.4     Call MD for:  persistent nausea and vomiting or diarrhea     Call MD for:  redness, tenderness, or signs of infection (pain, swelling, redness, odor or green/yellow discharge around incision site)     Call MD for:  severe uncontrolled pain     Call MD for:  difficulty breathing or increased cough     Call MD for:  severe persistent headache     Call MD for:  worsening rash     Call MD for:  persistent dizziness, light-headedness, or visual disturbances     Call MD for:  increased confusion or weakness     No dressing needed       Follow-up Information     Arianna Sanchez NP. Schedule an appointment as soon as possible for a visit in 1 week.    Specialty:  Otolaryngology  Why:  For wound re-check  Contact information:  [address]  [facility] [address]  [phone]

## 2017-09-15 NOTE — TRANSFER OF CARE
"Anesthesia Transfer of Care Note    Patient: Saba Hansen    Procedure(s) Performed: Procedure(s) (LRB):  LARYNGOSCOPY BRONCHOSCOPY-DIRECT (N/A)  BIOPSY-TONSIL (Left)    Patient location: PACU    Anesthesia Type: general    Transport from OR: Transported from OR on 6-10 L/min O2 by face mask with adequate spontaneous ventilation    Post pain: adequate analgesia    Post assessment: no apparent anesthetic complications    Post vital signs: stable    Level of consciousness: awake    Nausea/Vomiting: no nausea/vomiting    Complications: none    Transfer of care protocol was followed      Last vitals:   Visit Vitals  /70   Pulse 74   Temp 36.6 °C (97.9 °F) (Oral)   Resp 16   Ht 6' 1" (1.854 m)   Wt 84.4 kg (186 lb)   SpO2 99%   BMI 24.54 kg/m²     "

## 2017-09-16 ENCOUNTER — HOSPITAL ENCOUNTER (EMERGENCY)
Facility: HOSPITAL | Age: 62
Discharge: HOME | End: 2017-09-16
Attending: EMERGENCY MEDICINE
Payer: MEDICAID

## 2017-09-16 VITALS
RESPIRATION RATE: 18 BRPM | WEIGHT: 195 LBS | DIASTOLIC BLOOD PRESSURE: 81 MMHG | OXYGEN SATURATION: 95 % | HEART RATE: 92 BPM | BODY MASS INDEX: 25.84 KG/M2 | SYSTOLIC BLOOD PRESSURE: 160 MMHG | TEMPERATURE: 100 F | HEIGHT: 73 IN

## 2017-09-16 DIAGNOSIS — R33.9 URINARY RETENTION: Primary | ICD-10-CM

## 2017-09-16 LAB
BACTERIA #/AREA URNS AUTO: NORMAL /HPF
BILIRUB UR QL STRIP: NEGATIVE
BUN SERPL-MCNC: 8 MG/DL (ref 6–30)
CHLORIDE SERPL-SCNC: 103 MMOL/L (ref 95–110)
CLARITY UR REFRACT.AUTO: CLEAR
COLOR UR AUTO: ABNORMAL
CREAT SERPL-MCNC: 0.9 MG/DL (ref 0.5–1.4)
GLUCOSE SERPL-MCNC: 110 MG/DL (ref 70–110)
GLUCOSE UR QL STRIP: NEGATIVE
HCT VFR BLD CALC: 41 %PCV (ref 36–54)
HGB UR QL STRIP: ABNORMAL
KETONES UR QL STRIP: NEGATIVE
LEUKOCYTE ESTERASE UR QL STRIP: NEGATIVE
MICROSCOPIC COMMENT: NORMAL
NITRITE UR QL STRIP: NEGATIVE
PH UR STRIP: 5 [PH] (ref 5–8)
POC IONIZED CALCIUM: 1.21 MMOL/L (ref 1.06–1.42)
POC TCO2 (MEASURED): 27 MMOL/L (ref 23–29)
POTASSIUM BLD-SCNC: 3.9 MMOL/L (ref 3.5–5.1)
PROT UR QL STRIP: NEGATIVE
RBC #/AREA URNS AUTO: 0 /HPF (ref 0–4)
SAMPLE: NORMAL
SODIUM BLD-SCNC: 140 MMOL/L (ref 136–145)
SP GR UR STRIP: 1 (ref 1–1.03)
URN SPEC COLLECT METH UR: ABNORMAL
UROBILINOGEN UR STRIP-ACNC: NEGATIVE EU/DL
WBC #/AREA URNS AUTO: 0 /HPF (ref 0–5)

## 2017-09-16 PROCEDURE — 99283 EMERGENCY DEPT VISIT LOW MDM: CPT | Mod: 25

## 2017-09-16 PROCEDURE — 51702 INSERT TEMP BLADDER CATH: CPT

## 2017-09-16 PROCEDURE — 99283 EMERGENCY DEPT VISIT LOW MDM: CPT | Mod: ,,,

## 2017-09-16 PROCEDURE — 81001 URINALYSIS AUTO W/SCOPE: CPT

## 2017-09-16 NOTE — ED TRIAGE NOTES
"Pt c/o urine hesitancy/ retention since throat surgery yesterday - states "I have to force myself to go and only a few drops come out." Pt reports pain with urination due to forcing self to urinate - denies burning with urination. Denies N/V or abdominal pain. Denies fever or chills.    Patient identifiers verified and correct for Saba Hansen.    LOC: The patient is awake, alert and oriented x 4. Pt is speaking appropriately, no slurred speech.  APPEARANCE: Patient resting comfortably and in no acute distress. Pt is clean and well groomed. No JVD visible.   SKIN: Skin is warm dry and intact, and color is consistent with ethnicity. No tenting observed and capillary refill <3 seconds. No clubbing noted to nail beds. No breakdown or brusing visible and mucus membranes moist and acyanotic.  MUSCULOSKELETAL: Full range of motion present in all extremities. Hand  equal and leg strength strong +2 bilaterally.  RESPIRATORY: Airway is open and patent. Respirations-unlabored, regular rate, equal bilaterally on inspiration and expiration. No accessory muscle use noted. Lungs clear to auscultation in all fields bilaterally anterior and posterior. Pt has recent throat surgery yesterday - redness noted to throat, no active bleeding, pt reports small amount of blood in spit.  CARDIAC: Patient has regular heart rate and rhythm.  No peripheral edema noted, and patient has no c/o chest pain.  ABDOMEN: Soft, mild tenderness noted to LLQ, no distention noted. Normoactive bowel sounds X4 quadrants. Pt has no complaints of abnormal bowel movements. Pt reports normal appetite.   NEUROLOGIC: Eyes open spontaneously and facial expression symmetrical. Pt behavior appropriate to situation, and pt follows commands.  Pt reports sensation present in all extremities when touched with a finger. No s/s of ischemic stroke. PERRLA  : Pt c/o urine hesitancy/ retention. No c/o blood in urine.    "

## 2017-09-16 NOTE — ED PROVIDER NOTES
Encounter Date: 9/16/2017    SCRIBE #1 NOTE: I, Jeny Michelle, am scribing for, and in the presence of,  Dr. Schmidt. I have scribed the following portions of the note - the APC attestation.       History     Chief Complaint   Patient presents with    Male  Problem     urine barely coming out, few drops only     62 y.o. Male with medical history of asthma presents to ED with urinary retention. Patient states symptoms began yesterday after he had surgery for a tonsillar mass to be removed. Patient reports dribbling less than 10cc each time he has urinated in the past 24 hours. Endorses suprapubic pain. Denies fever, chills, nausea, vomiting, chest pain, shortness of breath, weakness, syncope, flank pain, h/o prostate complications, bowel incontinence.          Review of patient's allergies indicates:  No Known Allergies  Past Medical History:   Diagnosis Date    Asthma      Past Surgical History:   Procedure Laterality Date    AORTOPEXY W/ MLB      NASAL SINUS SURGERY       History reviewed. No pertinent family history.  Social History   Substance Use Topics    Smoking status: Never Smoker    Smokeless tobacco: Never Used    Alcohol use No      Comment: rarely     Review of Systems   Constitutional: Negative for diaphoresis, fatigue and fever.   HENT: Negative for congestion and nosebleeds.    Eyes: Negative for visual disturbance.   Respiratory: Negative for cough and shortness of breath.    Cardiovascular: Negative for chest pain and leg swelling.   Gastrointestinal: Negative for abdominal distention, abdominal pain, nausea and vomiting.   Genitourinary: Positive for difficulty urinating. Negative for dysuria, flank pain and hematuria.   Musculoskeletal: Negative for back pain and neck pain.   Skin: Negative for rash.   Neurological: Negative for syncope, speech difficulty, weakness, light-headedness and headaches.   Psychiatric/Behavioral: The patient is not nervous/anxious.        Physical Exam      Initial Vitals [09/16/17 1104]   BP Pulse Resp Temp SpO2   (!) 160/81 92 18 99.7 °F (37.6 °C) 95 %      MAP       107.33         Physical Exam    Vitals reviewed.  Constitutional: Vital signs are normal. He appears well-developed and well-nourished. He is not diaphoretic. No distress.   HENT:   Head: Normocephalic and atraumatic.   Nose: Nose normal.   Eyes: Conjunctivae and lids are normal. Pupils are equal, round, and reactive to light. Lids are everted and swept, no foreign bodies found.   Neck: Trachea normal and normal range of motion. Neck supple.   Cardiovascular: Normal rate, regular rhythm, intact distal pulses and normal pulses.   No murmur heard.  Pulmonary/Chest: Breath sounds normal. He has no wheezes. He has no rhonchi. He has no rales.   Abdominal: Soft. Normal appearance and bowel sounds are normal. He exhibits no distension and no ascites. There is tenderness in the suprapubic area. There is no rigidity, no rebound, no guarding and no CVA tenderness.   Musculoskeletal: Normal range of motion. He exhibits no tenderness.   Neurological: He is alert and oriented to person, place, and time. He has normal strength. No cranial nerve deficit or sensory deficit.   Skin: Skin is warm and dry. No rash noted. No cyanosis.   Psychiatric: He has a normal mood and affect.         ED Course   Procedures  Labs Reviewed   URINALYSIS, REFLEX TO URINE CULTURE - Abnormal; Notable for the following:        Result Value    Occult Blood UA 1+ (*)     All other components within normal limits    Narrative:     Preferred Collection Type->Urine, Clean Catch   URINALYSIS MICROSCOPIC    Narrative:     Preferred Collection Type->Urine, Clean Catch   ISTAT PROCEDURE   ISTAT CHEM8             Medical Decision Making:   History:   Old Medical Records: I decided to obtain old medical records.  Old Records Summarized: records from clinic visits.  Clinical Tests:   Lab Tests: Ordered and Reviewed       APC / Resident Notes:   62  y.o. Male with medical history of asthma presents to ED with urinary retention.    DDX includes but is not limited to urinary retention, UTI, FUNMILAYO, pyelonephritis. Considered but do not suspect cauda equina. 850cc seen on bladder scan. Will put in carrion, obtain UA and Istat. Istat benign, Cr 0.9. UA benign. Will discharge home with carrion leg bag and have patient follow up with urology. Discharged to home in stable condition, return to ED warnings given, follow up and patient care instructions given.      I have discussed and reviewed with my supervising physician.       Scribe Attestation:   Scribe #1: I performed the above scribed service and the documentation accurately describes the services I performed. I attest to the accuracy of the note.    Attending Attestation:     Physician Attestation Statement for NP/PA:   I discussed this assessment and plan of this patient with the NP/PA, but I did not personally examine the patient. The face to face encounter was performed by the NP/PA.    Other NP/PA Attestation Additions:    History of Present Illness: Urinary retention         Physician Attestation for Scribe:  Physician Attestation Statement for Scribe #1: I, Dr. Schmidt, reviewed documentation, as scribed by Jeny Michelle in my presence, and it is both accurate and complete.                 ED Course      Clinical Impression:   The encounter diagnosis was Urinary retention.    Disposition:   Disposition: Discharged  Condition: Stable                        LAZARUS PhelpsC  09/16/17 0232

## 2017-09-16 NOTE — ED NOTES
Pt post void bladder scan result approx 870ml - Mira BARRERA and Dr Schmidt informed, verbal order received per Dr Schmidt to insert carrion catheter with leg bag.

## 2017-09-18 LAB
FLOW CYTOMETRY ANTIBODIES ANALYZED - TISSUE: NORMAL
FLOW CYTOMETRY COMMENT - TISSUE: NORMAL
FLOW CYTOMETRY INTERPRETATION - TISSUE: NORMAL
SPECIMEN TYPE - TISSUE: NORMAL

## 2017-09-19 NOTE — OP NOTE
DATE OF PROCEDURE:  09/15/2017.    PREOPERATIVE DIAGNOSIS:  Left tonsil mass.    POSTOPERATIVE DIAGNOSIS:  Left tonsil mass.    PROCEDURES PERFORMED  Direct laryngoscopy with biopsy.    INDICATIONS FOR PROCEDURE:  The patient is a 62-year-old male with a CT finding   of left tonsillar mass.  It was felt best to take him to the Operating Room for   the above procedure.  Risks, benefits and alternatives were explained to the   patient and he wished to proceed.    SURGEON:  Fern Garcia M.D.    ANESTHESIA:  General.    DESCRIPTION OF PROCEDURE:  The patient was taken to the Operating Room and   placed under general endotracheal anesthesia without complication.  Head of bed   was turned 90 degrees to the patient's right and head drape was placed.  A   mouthguard was inserted.  The patient's oral cavity and oropharynx was all   inspected and found to have some fullness of the left tonsillar area and left   base of tongue.  Skyler laryngoscope was then inserted and used to inspect the   area and found this fullness and some erythematous changes to the mucosa of the   anterior tonsillar pillar extending down to the base of tongue.  Multiple   biopsies were taken of this lesion and sent for fresh and permanent pathology.    Hemostasis was achieved with Afrin-soaked pledgets.  This was found to be   adequate.  The patient was turned back to Anesthesia care and extubated and   taken to the PACU for recovery without event.  He tolerated the surgery well.    COMPLICATIONS:  None.    ESTIMATED BLOOD LOSS:  10 mL.    SPECIMENS SENT TO PATHOLOGY:  Left tonsillar tissue.      MSC/HN  dd: 09/19/2017 10:24:37 (CDT)  td: 09/19/2017 11:55:15 (CDT)  Doc ID   #[id number]  Job ID #[id number]    CC:

## 2017-09-21 ENCOUNTER — OFFICE VISIT (OUTPATIENT)
Dept: UROLOGY | Facility: CLINIC | Age: 62
End: 2017-09-21
Payer: MEDICAID

## 2017-09-21 ENCOUNTER — TELEPHONE (OUTPATIENT)
Dept: UROLOGY | Facility: CLINIC | Age: 62
End: 2017-09-21

## 2017-09-21 VITALS
WEIGHT: 180 LBS | BODY MASS INDEX: 23.75 KG/M2 | HEART RATE: 100 BPM | DIASTOLIC BLOOD PRESSURE: 79 MMHG | SYSTOLIC BLOOD PRESSURE: 130 MMHG

## 2017-09-21 DIAGNOSIS — R33.9 URINARY RETENTION: Primary | ICD-10-CM

## 2017-09-21 DIAGNOSIS — Z46.6 ENCOUNTER FOR FOLEY CATHETER REMOVAL: ICD-10-CM

## 2017-09-21 DIAGNOSIS — N13.8 BPH WITH URINARY OBSTRUCTION: ICD-10-CM

## 2017-09-21 DIAGNOSIS — N40.1 BPH WITH URINARY OBSTRUCTION: ICD-10-CM

## 2017-09-21 PROCEDURE — 99999 PR PBB SHADOW E&M-EST. PATIENT-LVL III: CPT | Mod: PBBFAC,,, | Performed by: NURSE PRACTITIONER

## 2017-09-21 PROCEDURE — 99213 OFFICE O/P EST LOW 20 MIN: CPT | Mod: PBBFAC | Performed by: NURSE PRACTITIONER

## 2017-09-21 PROCEDURE — 99203 OFFICE O/P NEW LOW 30 MIN: CPT | Mod: S$PBB,,, | Performed by: NURSE PRACTITIONER

## 2017-09-21 PROCEDURE — 3008F BODY MASS INDEX DOCD: CPT | Mod: ,,, | Performed by: NURSE PRACTITIONER

## 2017-09-21 RX ORDER — TAMSULOSIN HYDROCHLORIDE 0.4 MG/1
0.4 CAPSULE ORAL NIGHTLY
Qty: 30 CAPSULE | Refills: 12 | Status: SHIPPED | OUTPATIENT
Start: 2017-09-21 | End: 2018-10-18 | Stop reason: SDUPTHER

## 2017-09-21 NOTE — PROGRESS NOTES
Subjective:       Patient ID: Saba Hansen is a 62 y.o. male.    Chief Complaint: Urinary Retention    Saba Hansen is a 62 y.o. Male new to Urology.  He is here today due to Urinary Retention following ENT Surgery on 09/15/2017.    He presented in ER 09/16/2017 with urinary retention.   Patient states symptoms began yesterday after he had surgery for a tonsillar mass to be removed.   Patient reports dribbling less than 10cc each time he has urinated in the past 24 hours.   Endorses suprapubic pain.   Denies fever, chills, nausea, vomiting, chest pain, shortness of breath, weakness, syncope, flank pain, h/o prostate complications, bowel incontinence.  850cc seen on bladder scan.   ER placed a carrion; his Cr 0.9. UA benign.     He is here today for carrion removal.  He states good urine flow in catheter.  No issues with constipation.  Last pain pill was last night.    He states prior to surgery he had no issues with urination.  FOS was good.  Nocturia 1x  No family history of Prostate Cancer  Denies any issues with ED.              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  No pertinent family history.      Social History    Marital status: Single              Spouse name:                       Years of education:                 Number of children:               Occupational History    None on file    Social History Main Topics    Smoking status: Never Smoker                                                                Smokeless tobacco: Never Used                        Alcohol use: No                 Comment: rarely    Drug use: No              Sexual activity: Not on file          Other Topics            Concern    None on file    Social History Narrative    None on file        Allergies:  Review of patient's allergies indicates no known allergies.    Medications:  Current Outpatient Prescriptions:   fluticasone  (FLONASE) 50 mcg/actuation nasal spray, 2 sprays by Each Nare route once daily., Disp: 2 Bottle, Rfl: 3  ondansetron (ZOFRAN-ODT) 8 MG TbDL, Take 1 tablet (8 mg total) by mouth every 12 (twelve) hours as needed., Disp: 20 tablet, Rfl: 0  oxycodone-acetaminophen (PERCOCET)  mg per tablet, Take 1 tablet by mouth every 6 (six) hours as needed for Pain., Disp: 30 tablet, Rfl: 0                      Review of Systems   Constitutional: Negative.  Negative for activity change, appetite change and fever.   HENT: Negative.  Negative for facial swelling and trouble swallowing.    Eyes: Negative.    Respiratory: Negative.  Negative for shortness of breath.    Cardiovascular: Negative.  Negative for chest pain and palpitations.   Gastrointestinal: Negative for abdominal pain, constipation, diarrhea, nausea and vomiting.   Genitourinary: Positive for difficulty urinating. Negative for dysuria, enuresis, flank pain, frequency, genital sores, hematuria, nocturia, penile pain, scrotal swelling, testicular pain and urgency.        Red draining well     Musculoskeletal: Negative for back pain, gait problem and neck stiffness.   Skin: Negative.  Negative for wound.   Neurological: Negative for dizziness, tremors, seizures, syncope, speech difficulty, light-headedness and headaches.   Hematological: Does not bruise/bleed easily.   Psychiatric/Behavioral: Negative for confusion and hallucinations. The patient is not nervous/anxious.        Objective:      Physical Exam   Nursing note and vitals reviewed.  Constitutional: He is oriented to person, place, and time. Vital signs are normal. He appears well-developed and well-nourished. He is active and cooperative.  Non-toxic appearance. He does not have a sickly appearance.   HENT:   Head: Normocephalic and atraumatic.   Right Ear: External ear normal.   Left Ear: External ear normal.   Nose: Nose normal.   Mouth/Throat: Mucous membranes are normal.   Eyes: Conjunctivae and lids  are normal. No scleral icterus.   Neck: Trachea normal, normal range of motion and full passive range of motion without pain. Neck supple. No JVD present. No tracheal deviation present.   Cardiovascular: Normal rate, regular rhythm, S1 normal and S2 normal.    Pulmonary/Chest: Effort normal and breath sounds normal. No respiratory distress. He exhibits no tenderness.   Abdominal: Soft. Normal appearance and bowel sounds are normal. There is no hepatosplenomegaly. There is no tenderness. There is no rebound, no guarding and no CVA tenderness.   Genitourinary: Testes normal and penis normal. Right testis shows no tenderness. Left testis shows no swelling and no tenderness. No penile tenderness.   Musculoskeletal: Normal range of motion.   Neurological: He is alert and oriented to person, place, and time. He has normal strength.   Skin: Skin is warm, dry and intact.     Psychiatric: He has a normal mood and affect. His behavior is normal. Judgment and thought content normal.       Assessment:       1. Urinary retention    2. Encounter for Carrion catheter removal    3. BPH with urinary obstruction        Plan:         I spent 25 minutes with the patient of which more than half was spent in direct consultation with the patient in regards to our treatment and plan.    Education and recommendations of today's plan of care including home remedies.  We discussed the contributing factors for his LUTS.  Rx for Flomax to start today; benefits, risks,se discussed.  Understands this medication can be just temporary during this time; can start and stop without weaning off.  Carrion catheter was easily removed.  Discussed expectations after carrion removal  He drinking water in office; encouraged to increase water intake so able to see if he can urinate;  Again discussed expectations; any problems rtc today.  Will call and touch base before end of day.  RTG one month for PSA and PVR.

## 2017-09-21 NOTE — LETTER
September 21, 2017      Fern Garcia MD  [address]  [facility] [address]           [facility]  [address]  [facility] [address]  Phone: [phone]          Patient: Saba Hansen   MR Number: [id number]   YOB: 1955   Date of Visit: 9/21/2017       Dear Dr. Fern Garcia:    Thank you for referring Saba Hansen to me for evaluation. Attached you will find relevant portions of my assessment and plan of care.    If you have questions, please do not hesitate to call me. I look forward to following Saba Hansen along with you.    Sincerely,    Mirian Kelly, NP    Enclosure  CC:  No Recipients    If you would like to receive this communication electronically, please contact [email or web address] or [phone] to request more information on [facility] access.    For providers and/or their staff who would like to refer a patient to [facility], please contact us through our one-stop-shop provider referral line, [facility] , at [phone].    If you feel you have received this communication in error or would no longer like to receive these types of communications, please e-mail [email or web address]

## 2017-09-21 NOTE — TELEPHONE ENCOUNTER
I spoke with Mr. Hansen   He states urinating just fine; very pleased with how things are going  F/u one month with PSA and PVR

## 2017-09-29 ENCOUNTER — TELEPHONE (OUTPATIENT)
Dept: OTOLARYNGOLOGY | Facility: CLINIC | Age: 62
End: 2017-09-29

## 2017-09-29 NOTE — TELEPHONE ENCOUNTER
----- Message from Jacquie Baires sent at 9/29/2017 11:56 AM CDT -----  Call patient about coughing up blood. Call [phone].

## 2017-10-10 ENCOUNTER — OFFICE VISIT (OUTPATIENT)
Dept: OTOLARYNGOLOGY | Facility: CLINIC | Age: 62
End: 2017-10-10
Payer: MEDICAID

## 2017-10-10 VITALS
WEIGHT: 177.5 LBS | TEMPERATURE: 97 F | SYSTOLIC BLOOD PRESSURE: 128 MMHG | HEART RATE: 115 BPM | BODY MASS INDEX: 23.41 KG/M2 | DIASTOLIC BLOOD PRESSURE: 84 MMHG

## 2017-10-10 DIAGNOSIS — J35.8 TONSILLAR MASS: Primary | ICD-10-CM

## 2017-10-10 PROCEDURE — 99999 PR PBB SHADOW E&M-EST. PATIENT-LVL III: CPT | Mod: PBBFAC,,, | Performed by: OTOLARYNGOLOGY

## 2017-10-10 PROCEDURE — 99213 OFFICE O/P EST LOW 20 MIN: CPT | Mod: PBBFAC | Performed by: OTOLARYNGOLOGY

## 2017-10-10 PROCEDURE — 99214 OFFICE O/P EST MOD 30 MIN: CPT | Mod: S$PBB,,, | Performed by: OTOLARYNGOLOGY

## 2017-10-10 RX ORDER — LIDOCAINE HYDROCHLORIDE 10 MG/ML
1 INJECTION, SOLUTION EPIDURAL; INFILTRATION; INTRACAUDAL; PERINEURAL ONCE
Status: CANCELLED | OUTPATIENT
Start: 2017-10-10 | End: 2017-10-10

## 2017-10-10 NOTE — PROGRESS NOTES
Chief Complaint   Patient presents with    follow up/ having trouble breathing     need refill of medications         62 y.o. male presents with longstanding history of loss of smell starting in 2006. [facility] a [facility] ago [facility]. Was doing better but has noted increased nasal congestion and inability to breathe through his nose. He also has some coughing. No allergy to aspirin or asthma diagnosis. Currently on Flonase.     Here after DL/Biopsy. Pathology demonstrates benign reactive tissue. He states that he still feels fullness in the back of his throat and he reports continued unintentional weight loss.       Review of Systems     Constitutional: Negative for fatigue and unexpected weight change.   HENT: per HPI.   Eyes: Negative for visual disturbance.   Respiratory: Negative for shortness of breath, hemoptysis  Cardiovascular: Negative for chest pain and palpitations.   Genitourinary: Negative for dysuria and difficulty urinating.   Musculoskeletal: Negative for decreased ROM, back pain.   Skin: Negative for rash, sunburn, itching.   Neurological: Negative for dizziness and seizures.   Hematological: Negative for adenopathy. Does not bruise/bleed easily.   Psychiatric/Behavioral: Negative for agitation. The patient is not nervous/anxious.   Endocrine: Negative for rapid weight loss/weight gain, heat/cold intolerance.     Past Medical History:   Diagnosis Date    Asthma        Past Surgical History:   Procedure Laterality Date    AORTOPEXY W/ MLB      NASAL SINUS SURGERY         family history is not on file.    Pt  reports that he has never smoked. He has never used smokeless tobacco. He reports that he does not drink alcohol or use drugs.    Review of patient's allergies indicates:  No Known Allergies     Physical Exam    Vitals:    10/10/17 0913   BP: 128/84   Pulse: (!) 115   Temp: 96.8 °F (36 °C)     Body mass index is 23.41 kg/m².    Physical Exam   Constitutional: He is oriented  to person, place, and time. He appears well-developed and well-nourished. No distress.   HENT:   Head: Normocephalic and atraumatic.   Right Ear: Hearing, tympanic membrane, external ear and ear canal normal. Tympanic membrane mobility is normal. No middle ear effusion. No decreased hearing is noted.   Left Ear: Hearing, tympanic membrane, external ear and ear canal normal. Tympanic membrane mobility is normal.  No middle ear effusion. No decreased hearing is noted.   Nose: Nose normal. Right sinus exhibits no maxillary sinus tenderness and no frontal sinus tenderness. Left sinus exhibits no maxillary sinus tenderness and no frontal sinus tenderness.   Mouth/Throat: Uvula is midline, oropharynx is clear and moist and mucous membranes are normal.       Bilateral nasal cavities with polypoid change extending posteriorly. No purulent fluid seen.   Eyes: Conjunctivae, EOM and lids are normal. Pupils are equal, round, and reactive to light. Right eye exhibits no discharge. Left eye exhibits no discharge.   Neck: Trachea normal, normal range of motion and phonation normal. Neck supple. No tracheal tenderness present. No tracheal deviation, no edema and no erythema present. No thyroid mass and no thyromegaly present.   Cardiovascular: Normal heart sounds.    Pulmonary/Chest: Breath sounds normal. No stridor.   Abdominal: Soft.   Lymphadenopathy:     He has no cervical adenopathy.   Neurological: He is alert and oriented to person, place, and time.   Skin: Skin is warm and dry. No rash noted. He is not diaphoretic. No erythema. No pallor.   Psychiatric: He has a normal mood and affect.   Nursing note and vitals reviewed.      Studies reviewed:  CT Sinus 9/6/17-  Impression         1) Extensive opacification of the nasal sinuses with polypoid opacity extending into the nasal cavity as described above. 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    2) Postsurgical changes of  functional endoscopic sinus surgery. The left medial antrostomy is narrowed but patent. Right is completely opacified.    3) 3.1 cm soft tissue mass at the level of the left palatine tonsil, incompletely visualized and not well characterized on this noncontrasted study, but concerning for malignancy. Further evaluation with direct visualization is advised and further imaging with contrast enhanced CT if warranted.       CT Neck 9/12/17:   Impression         Ill-defined 2.9 cm area of soft tissue lesion in the left tonsillar area, similar to CT 09/06/2017, concerning for squamous cell carcinoma with an ipsilateral 1.3 cm level III cervical node, could indicate regional spread of tumor.     Extensive sinus disease compatible with allergic fungal sinusitis and associated sinonasal polyposis as seen on recent CT sinus study.     Pathology 9/15/17:  FINAL PATHOLOGIC DIAGNOSIS  1, 2. LEFT TONSIL MASS (BIOPSY):  -No neoplasm identified  -Lymphoid hyperplasia and reactive changes  -Interpretation limited by tissue artifacts      Assessment     1. Tonsillar mass          Plan  In summary, Mr. Hansen is a 62 year old male with recurrent nasal polyposis. During recent workup was found to have left tonsillar mass. Biopsies done on 9/15/17 negative, but given the persistent nature and continued weight loss, I recommend a repeat DL/biopsy in the OR with bilateral tonsillectomy. Risks, benefits, and alternatives again discussed. Risks include, but are not limited to, pain, bleeding, infection, tongue injury and numbness, as well as injury to the structures of the oral cavity and upper aerodigestive tract. All questions were answered and he wishes to proceed with surgery. Plan for OR 10/13/17.

## 2017-10-12 ENCOUNTER — TELEPHONE (OUTPATIENT)
Dept: OTOLARYNGOLOGY | Facility: CLINIC | Age: 62
End: 2017-10-12

## 2017-10-13 ENCOUNTER — ANESTHESIA (OUTPATIENT)
Dept: SURGERY | Facility: HOSPITAL | Age: 62
End: 2017-10-13
Payer: MEDICAID

## 2017-10-13 ENCOUNTER — ANESTHESIA EVENT (OUTPATIENT)
Dept: SURGERY | Facility: HOSPITAL | Age: 62
End: 2017-10-13
Payer: MEDICAID

## 2017-10-13 ENCOUNTER — HOSPITAL ENCOUNTER (OUTPATIENT)
Facility: HOSPITAL | Age: 62
Discharge: HOME | End: 2017-10-14
Attending: OTOLARYNGOLOGY | Admitting: OTOLARYNGOLOGY
Payer: MEDICAID

## 2017-10-13 DIAGNOSIS — J35.8 TONSILLAR MASS: Primary | ICD-10-CM

## 2017-10-13 PROCEDURE — 25000003 PHARM REV CODE 250: Performed by: STUDENT IN AN ORGANIZED HEALTH CARE EDUCATION/TRAINING PROGRAM

## 2017-10-13 PROCEDURE — 25000003 PHARM REV CODE 250: Performed by: OTOLARYNGOLOGY

## 2017-10-13 PROCEDURE — 36000707: Performed by: OTOLARYNGOLOGY

## 2017-10-13 PROCEDURE — S0028 INJECTION, FAMOTIDINE, 20 MG: HCPCS | Performed by: NURSE ANESTHETIST, CERTIFIED REGISTERED

## 2017-10-13 PROCEDURE — 88304 TISSUE EXAM BY PATHOLOGIST: CPT

## 2017-10-13 PROCEDURE — 42826 REMOVAL OF TONSILS: CPT | Mod: 52,,, | Performed by: OTOLARYNGOLOGY

## 2017-10-13 PROCEDURE — 71000039 HC RECOVERY, EACH ADD'L HOUR: Performed by: OTOLARYNGOLOGY

## 2017-10-13 PROCEDURE — 63600175 PHARM REV CODE 636 W HCPCS: Performed by: NURSE ANESTHETIST, CERTIFIED REGISTERED

## 2017-10-13 PROCEDURE — 36000706: Performed by: OTOLARYNGOLOGY

## 2017-10-13 PROCEDURE — 37000009 HC ANESTHESIA EA ADD 15 MINS: Performed by: OTOLARYNGOLOGY

## 2017-10-13 PROCEDURE — 71000033 HC RECOVERY, INTIAL HOUR: Performed by: OTOLARYNGOLOGY

## 2017-10-13 PROCEDURE — D9220A PRA ANESTHESIA: Mod: CRNA,,, | Performed by: NURSE ANESTHETIST, CERTIFIED REGISTERED

## 2017-10-13 PROCEDURE — 94760 N-INVAS EAR/PLS OXIMETRY 1: CPT

## 2017-10-13 PROCEDURE — 37000008 HC ANESTHESIA 1ST 15 MINUTES: Performed by: OTOLARYNGOLOGY

## 2017-10-13 PROCEDURE — 88304 TISSUE EXAM BY PATHOLOGIST: CPT | Mod: 26,,,

## 2017-10-13 PROCEDURE — D9220A PRA ANESTHESIA: Mod: ANES,,, | Performed by: ANESTHESIOLOGY

## 2017-10-13 PROCEDURE — 25000003 PHARM REV CODE 250: Performed by: NURSE ANESTHETIST, CERTIFIED REGISTERED

## 2017-10-13 PROCEDURE — 27000221 HC OXYGEN, UP TO 24 HOURS

## 2017-10-13 RX ORDER — SODIUM CHLORIDE 0.9 % (FLUSH) 0.9 %
3 SYRINGE (ML) INJECTION
Status: DISCONTINUED | OUTPATIENT
Start: 2017-10-13 | End: 2017-10-13 | Stop reason: HOSPADM

## 2017-10-13 RX ORDER — HYDROCODONE BITARTRATE AND ACETAMINOPHEN 7.5; 325 MG/15ML; MG/15ML
15 SOLUTION ORAL EVERY 4 HOURS PRN
Qty: 473 ML | Refills: 0 | Status: SHIPPED | OUTPATIENT
Start: 2017-10-13 | End: 2017-11-30 | Stop reason: ALTCHOICE

## 2017-10-13 RX ORDER — NEOSTIGMINE METHYLSULFATE 1 MG/ML
INJECTION, SOLUTION INTRAVENOUS
Status: DISCONTINUED | OUTPATIENT
Start: 2017-10-13 | End: 2017-10-13

## 2017-10-13 RX ORDER — LIDOCAINE HYDROCHLORIDE 10 MG/ML
1 INJECTION, SOLUTION EPIDURAL; INFILTRATION; INTRACAUDAL; PERINEURAL ONCE
Status: COMPLETED | OUTPATIENT
Start: 2017-10-13 | End: 2017-10-13

## 2017-10-13 RX ORDER — OXYMETAZOLINE HCL 0.05 %
SPRAY, NON-AEROSOL (ML) NASAL
Status: DISCONTINUED | OUTPATIENT
Start: 2017-10-13 | End: 2017-10-13 | Stop reason: HOSPADM

## 2017-10-13 RX ORDER — GLYCOPYRROLATE 0.2 MG/ML
INJECTION INTRAMUSCULAR; INTRAVENOUS
Status: DISCONTINUED | OUTPATIENT
Start: 2017-10-13 | End: 2017-10-13

## 2017-10-13 RX ORDER — SODIUM CHLORIDE 9 MG/ML
INJECTION, SOLUTION INTRAVENOUS CONTINUOUS
Status: DISCONTINUED | OUTPATIENT
Start: 2017-10-13 | End: 2017-10-13

## 2017-10-13 RX ORDER — MORPHINE SULFATE 2 MG/ML
4 INJECTION, SOLUTION INTRAMUSCULAR; INTRAVENOUS EVERY 4 HOURS PRN
Status: DISCONTINUED | OUTPATIENT
Start: 2017-10-13 | End: 2017-10-13

## 2017-10-13 RX ORDER — ROCURONIUM BROMIDE 10 MG/ML
INJECTION, SOLUTION INTRAVENOUS
Status: DISCONTINUED | OUTPATIENT
Start: 2017-10-13 | End: 2017-10-13

## 2017-10-13 RX ORDER — DEXAMETHASONE SODIUM PHOSPHATE 4 MG/ML
INJECTION, SOLUTION INTRA-ARTICULAR; INTRALESIONAL; INTRAMUSCULAR; INTRAVENOUS; SOFT TISSUE
Status: DISCONTINUED | OUTPATIENT
Start: 2017-10-13 | End: 2017-10-13

## 2017-10-13 RX ORDER — HYDROCODONE BITARTRATE AND ACETAMINOPHEN 7.5; 325 MG/15ML; MG/15ML
15 SOLUTION ORAL EVERY 4 HOURS PRN
Qty: 473 ML | Refills: 0 | Status: SHIPPED | OUTPATIENT
Start: 2017-10-13 | End: 2017-10-13

## 2017-10-13 RX ORDER — ONDANSETRON 8 MG/1
8 TABLET, ORALLY DISINTEGRATING ORAL EVERY 8 HOURS PRN
Status: DISCONTINUED | OUTPATIENT
Start: 2017-10-13 | End: 2017-10-14 | Stop reason: HOSPADM

## 2017-10-13 RX ORDER — ONDANSETRON 2 MG/ML
4 INJECTION INTRAMUSCULAR; INTRAVENOUS EVERY 8 HOURS PRN
Status: DISCONTINUED | OUTPATIENT
Start: 2017-10-13 | End: 2017-10-14 | Stop reason: HOSPADM

## 2017-10-13 RX ORDER — TAMSULOSIN HYDROCHLORIDE 0.4 MG/1
0.4 CAPSULE ORAL NIGHTLY
Status: DISCONTINUED | OUTPATIENT
Start: 2017-10-13 | End: 2017-10-14 | Stop reason: HOSPADM

## 2017-10-13 RX ORDER — FENTANYL CITRATE 50 UG/ML
INJECTION, SOLUTION INTRAMUSCULAR; INTRAVENOUS
Status: DISCONTINUED | OUTPATIENT
Start: 2017-10-13 | End: 2017-10-13

## 2017-10-13 RX ORDER — PROPOFOL 10 MG/ML
VIAL (ML) INTRAVENOUS
Status: DISCONTINUED | OUTPATIENT
Start: 2017-10-13 | End: 2017-10-13

## 2017-10-13 RX ORDER — ONDANSETRON 4 MG/1
8 TABLET, ORALLY DISINTEGRATING ORAL EVERY 8 HOURS PRN
Qty: 20 TABLET | Refills: 0 | Status: SHIPPED | OUTPATIENT
Start: 2017-10-13 | End: 2017-10-13

## 2017-10-13 RX ORDER — METOCLOPRAMIDE HYDROCHLORIDE 5 MG/ML
INJECTION INTRAMUSCULAR; INTRAVENOUS
Status: DISCONTINUED | OUTPATIENT
Start: 2017-10-13 | End: 2017-10-13

## 2017-10-13 RX ORDER — HYDROCODONE BITARTRATE AND ACETAMINOPHEN 7.5; 325 MG/15ML; MG/15ML
15 SOLUTION ORAL EVERY 4 HOURS PRN
Status: DISCONTINUED | OUTPATIENT
Start: 2017-10-13 | End: 2017-10-14 | Stop reason: HOSPADM

## 2017-10-13 RX ORDER — POLYETHYLENE GLYCOL 3350 17 G/17G
17 POWDER, FOR SOLUTION ORAL DAILY
Status: DISCONTINUED | OUTPATIENT
Start: 2017-10-13 | End: 2017-10-14 | Stop reason: HOSPADM

## 2017-10-13 RX ORDER — FAMOTIDINE 10 MG/ML
INJECTION INTRAVENOUS
Status: DISCONTINUED | OUTPATIENT
Start: 2017-10-13 | End: 2017-10-13

## 2017-10-13 RX ORDER — HYDROMORPHONE HYDROCHLORIDE 1 MG/ML
1 INJECTION, SOLUTION INTRAMUSCULAR; INTRAVENOUS; SUBCUTANEOUS EVERY 4 HOURS PRN
Status: DISCONTINUED | OUTPATIENT
Start: 2017-10-13 | End: 2017-10-14 | Stop reason: HOSPADM

## 2017-10-13 RX ORDER — HYDROCODONE BITARTRATE AND ACETAMINOPHEN 7.5; 325 MG/15ML; MG/15ML
15 SOLUTION ORAL EVERY 4 HOURS PRN
Status: DISCONTINUED | OUTPATIENT
Start: 2017-10-13 | End: 2017-10-13

## 2017-10-13 RX ORDER — LIDOCAINE HCL/PF 100 MG/5ML
SYRINGE (ML) INTRAVENOUS
Status: DISCONTINUED | OUTPATIENT
Start: 2017-10-13 | End: 2017-10-13

## 2017-10-13 RX ORDER — ONDANSETRON 2 MG/ML
INJECTION INTRAMUSCULAR; INTRAVENOUS
Status: DISCONTINUED | OUTPATIENT
Start: 2017-10-13 | End: 2017-10-13

## 2017-10-13 RX ORDER — SODIUM CHLORIDE 9 MG/ML
INJECTION, SOLUTION INTRAVENOUS CONTINUOUS
Status: DISCONTINUED | OUTPATIENT
Start: 2017-10-13 | End: 2017-10-14 | Stop reason: HOSPADM

## 2017-10-13 RX ORDER — KETAMINE HYDROCHLORIDE 100 MG/ML
INJECTION, SOLUTION INTRAMUSCULAR; INTRAVENOUS
Status: DISCONTINUED | OUTPATIENT
Start: 2017-10-13 | End: 2017-10-13

## 2017-10-13 RX ORDER — ONDANSETRON 4 MG/1
8 TABLET, ORALLY DISINTEGRATING ORAL EVERY 8 HOURS PRN
Qty: 20 TABLET | Refills: 0 | Status: SHIPPED | OUTPATIENT
Start: 2017-10-13 | End: 2018-03-29

## 2017-10-13 RX ORDER — HYDROCODONE BITARTRATE AND ACETAMINOPHEN 5; 325 MG/1; MG/1
1 TABLET ORAL EVERY 4 HOURS PRN
Status: DISCONTINUED | OUTPATIENT
Start: 2017-10-13 | End: 2017-10-14 | Stop reason: HOSPADM

## 2017-10-13 RX ORDER — MIDAZOLAM HYDROCHLORIDE 1 MG/ML
INJECTION, SOLUTION INTRAMUSCULAR; INTRAVENOUS
Status: DISCONTINUED | OUTPATIENT
Start: 2017-10-13 | End: 2017-10-13

## 2017-10-13 RX ORDER — PROMETHAZINE HYDROCHLORIDE 25 MG/1
25 TABLET ORAL EVERY 6 HOURS PRN
Status: DISCONTINUED | OUTPATIENT
Start: 2017-10-13 | End: 2017-10-14 | Stop reason: HOSPADM

## 2017-10-13 RX ORDER — ACETAMINOPHEN 10 MG/ML
INJECTION, SOLUTION INTRAVENOUS
Status: DISCONTINUED | OUTPATIENT
Start: 2017-10-13 | End: 2017-10-13

## 2017-10-13 RX ADMIN — GLYCOPYRROLATE 0.6 MG: 0.2 INJECTION, SOLUTION INTRAMUSCULAR; INTRAVENOUS at 10:10

## 2017-10-13 RX ADMIN — ACETAMINOPHEN 1000 MG: 10 INJECTION, SOLUTION INTRAVENOUS at 09:10

## 2017-10-13 RX ADMIN — HYDROCODONE BITARTRATE AND ACETAMINOPHEN 15 ML: 7.5; 325 SOLUTION ORAL at 11:10

## 2017-10-13 RX ADMIN — SODIUM CHLORIDE: 0.9 INJECTION, SOLUTION INTRAVENOUS at 09:10

## 2017-10-13 RX ADMIN — SODIUM CHLORIDE: 0.9 INJECTION, SOLUTION INTRAVENOUS at 07:10

## 2017-10-13 RX ADMIN — TAMSULOSIN HYDROCHLORIDE 0.4 MG: 0.4 CAPSULE ORAL at 09:10

## 2017-10-13 RX ADMIN — POLYETHYLENE GLYCOL 3350 17 G: 17 POWDER, FOR SOLUTION ORAL at 11:10

## 2017-10-13 RX ADMIN — ROCURONIUM BROMIDE 40 MG: 10 INJECTION, SOLUTION INTRAVENOUS at 09:10

## 2017-10-13 RX ADMIN — KETAMINE HYDROCHLORIDE 30 MG: 100 INJECTION, SOLUTION, CONCENTRATE INTRAMUSCULAR; INTRAVENOUS at 09:10

## 2017-10-13 RX ADMIN — MIDAZOLAM HYDROCHLORIDE 2 MG: 1 INJECTION, SOLUTION INTRAMUSCULAR; INTRAVENOUS at 09:10

## 2017-10-13 RX ADMIN — DEXAMETHASONE SODIUM PHOSPHATE 8 MG: 4 INJECTION, SOLUTION INTRAMUSCULAR; INTRAVENOUS at 09:10

## 2017-10-13 RX ADMIN — PROPOFOL 200 MG: 10 INJECTION, EMULSION INTRAVENOUS at 09:10

## 2017-10-13 RX ADMIN — LIDOCAINE HYDROCHLORIDE 100 MG: 20 INJECTION, SOLUTION INTRAVENOUS at 09:10

## 2017-10-13 RX ADMIN — SODIUM CHLORIDE: 0.9 INJECTION, SOLUTION INTRAVENOUS at 11:10

## 2017-10-13 RX ADMIN — FAMOTIDINE 20 MG: 10 INJECTION, SOLUTION INTRAVENOUS at 09:10

## 2017-10-13 RX ADMIN — ONDANSETRON 4 MG: 2 INJECTION INTRAMUSCULAR; INTRAVENOUS at 09:10

## 2017-10-13 RX ADMIN — LIDOCAINE HYDROCHLORIDE 0.2 MG: 10 INJECTION, SOLUTION EPIDURAL; INFILTRATION; INTRACAUDAL; PERINEURAL at 07:10

## 2017-10-13 RX ADMIN — HYDROCODONE BITARTRATE AND ACETAMINOPHEN 15 ML: 7.5; 325 SOLUTION ORAL at 07:10

## 2017-10-13 RX ADMIN — METOCLOPRAMIDE 10 MG: 5 INJECTION, SOLUTION INTRAMUSCULAR; INTRAVENOUS at 09:10

## 2017-10-13 RX ADMIN — FENTANYL CITRATE 100 MCG: 50 INJECTION, SOLUTION INTRAMUSCULAR; INTRAVENOUS at 09:10

## 2017-10-13 RX ADMIN — NEOSTIGMINE METHYLSULFATE 5 MG: 1 INJECTION INTRAVENOUS at 10:10

## 2017-10-13 RX ADMIN — HYDROCODONE BITARTRATE AND ACETAMINOPHEN 15 ML: 7.5; 325 SOLUTION ORAL at 02:10

## 2017-10-13 RX ADMIN — GLYCOPYRROLATE 0.2 MG: 0.2 INJECTION, SOLUTION INTRAMUSCULAR; INTRAVENOUS at 09:10

## 2017-10-13 RX ADMIN — SODIUM CHLORIDE, SODIUM GLUCONATE, SODIUM ACETATE, POTASSIUM CHLORIDE, MAGNESIUM CHLORIDE, SODIUM PHOSPHATE, DIBASIC, AND POTASSIUM PHOSPHATE: .53; .5; .37; .037; .03; .012; .00082 INJECTION, SOLUTION INTRAVENOUS at 09:10

## 2017-10-13 NOTE — TRANSFER OF CARE
Anesthesia Transfer of Care Note    Patient: Saba Hansen    Procedure(s) Performed: Procedure(s) (LRB):  TONSILLECTOMY (Left)    Patient location: PACU    Anesthesia Type: general    Transport from OR: Transported from OR on 6-10 L/min O2 by face mask with adequate spontaneous ventilation    Post pain: adequate analgesia    Post assessment: no apparent anesthetic complications    Post vital signs: stable    Level of consciousness: sedated    Nausea/Vomiting: no nausea/vomiting    Complications: none    Transfer of care protocol was followed      Last vitals:   Visit Vitals  /81 (BP Location: Right arm, Patient Position: Lying)   Pulse 91   Temp 36.6 °C (97.8 °F) (Oral)   Resp 16   Wt 80.5 kg (177 lb 7.5 oz)   SpO2 97%   BMI 23.41 kg/m²

## 2017-10-13 NOTE — ANESTHESIA PREPROCEDURE EVALUATION
10/13/2017  Saba Hansen is a 62 y.o., male.    Anesthesia Evaluation    I have reviewed the Patient Summary Reports.    I have reviewed the Nursing Notes.   I have reviewed the Medications.     Review of Systems  Anesthesia Hx:  No problems with previous Anesthesia  History of prior surgery of interest to airway management or planning: Previous anesthesia: General Denies Family Hx of Anesthesia complications.   Denies Personal Hx of Anesthesia complications.   Social:  Non-Smoker    Hematology/Oncology:  Hematology Normal   Oncology Normal     EENT/Dental:EENT/Dental Normal   Cardiovascular:  Cardiovascular Normal Exercise tolerance: good     Pulmonary:   Asthma    Renal/:  Renal/ Normal     Hepatic/GI:  Hepatic/GI Normal    Musculoskeletal:  Musculoskeletal Normal    Neurological:  Neurology Normal    Endocrine:  Endocrine Normal    Dermatological:  Skin Normal    Psych:  Psychiatric Normal           Physical Exam  General:  Well nourished    Airway/Jaw/Neck:  Airway Findings: Mouth Opening: Normal Tongue: Normal  General Airway Assessment: Adult, Average  Mallampati: II  Improves to II with phonation.  TM Distance: 4 - 6 cm        Eyes/Ears/Nose:  EYES/EARS/NOSE FINDINGS: Normal   Dental:  Dental Findings: In tact, molar caps   Chest/Lungs:  Chest/Lungs Findings: Clear to auscultation, Normal Respiratory Rate     Heart/Vascular:  Heart Findings: Rate: Normal  Rhythm: Regular Rhythm  Sounds: Normal  Heart murmur: negative Vascular Findings: Normal    Abdomen:  Abdomen Findings: Normal    Musculoskeletal:  Musculoskeletal Findings: Normal   Skin:  Skin Findings: Normal    Mental Status:  Mental Status Findings:  Cooperative, Alert and Oriented         Anesthesia Plan  Type of Anesthesia, risks & benefits discussed:  Anesthesia Type:  general  Patient's Preference:   Intra-op Monitoring Plan:   Intra-op  Monitoring Plan Comments:   Post Op Pain Control Plan:   Post Op Pain Control Plan Comments:   Induction:   IV  Beta Blocker:  Patient is not currently on a Beta-Blocker (No further documentation required).       Informed Consent: Patient understands risks and agrees with Anesthesia plan.  Questions answered. Anesthesia consent signed with patient.  ASA Score: 2     Day of Surgery Review of History & Physical:            Ready For Surgery From Anesthesia Perspective.

## 2017-10-13 NOTE — BRIEF OP NOTE
[facility]  Brief Operative Note    SUMMARY     Surgery Date: 10/13/2017     Surgeon(s) and Role:     * Fern Garcia MD - Primary    Assisting Surgeon: None    Pre-op Diagnosis:  Tonsillar mass [R22.0]    Post-op Diagnosis:  Post-Op Diagnosis Codes:     * Tonsillar mass [R22.0]    Procedure(s) (LRB):  TONSILLECTOMY (Bilateral)  LARYNGOSCOPY BRONCHOSCOPY-DIRECT (N/A)    Anesthesia: General    Description of Procedure: left side tonsillectomy, chromic suture and surgiflow placed in tonsillar fossa    Estimated Blood Loss: 30         Specimens:   Specimen (12h ago through future)    None        Admit for observation over night.

## 2017-10-13 NOTE — ANESTHESIA RELEASE NOTE
Anesthesia Release from PACU Note    Patient: Saba Hansen    Procedure(s) Performed: Procedure(s) (LRB):  TONSILLECTOMY (Left)    Anesthesia type: general    Post pain: Adequate analgesia    Post assessment: no apparent anesthetic complications    Last Vitals:   Visit Vitals  /87   Pulse 67   Temp 36.5 °C (97.7 °F) (Oral)   Resp 17   Wt 80.5 kg (177 lb 7.5 oz)   SpO2 95%   BMI 23.41 kg/m²       Post vital signs: stable    Level of consciousness: awake, alert  and oriented    Nausea/Vomiting: no nausea/no vomiting    Complications: none    Airway Patency: patent    Respiratory: unassisted, spontaneous ventilation, room air    Cardiovascular: stable and blood pressure at baseline    Hydration: euvolemic

## 2017-10-13 NOTE — ANESTHESIA POSTPROCEDURE EVALUATION
Anesthesia Post Evaluation    Patient: Saba Hansen    Procedure(s) Performed: Procedure(s) (LRB):  TONSILLECTOMY (Left)    Final Anesthesia Type: general  Patient location during evaluation: PACU  Patient participation: Yes- Able to Participate  Level of consciousness: awake and alert and oriented  Post-procedure vital signs: reviewed and stable  Pain management: adequate  Airway patency: patent  PONV status at discharge: No PONV  Anesthetic complications: no      Cardiovascular status: blood pressure returned to baseline  Respiratory status: room air, spontaneous ventilation and unassisted  Hydration status: euvolemic  Follow-up not needed.        Visit Vitals  /87   Pulse 67   Temp 36.5 °C (97.7 °F) (Oral)   Resp 17   Wt 80.5 kg (177 lb 7.5 oz)   SpO2 95%   BMI 23.41 kg/m²       Pain/Gregory Score: Pain Assessment Performed: Yes (10/13/2017 10:35 AM)  Presence of Pain: denies (10/13/2017 10:35 AM)  Pain Rating Prior to Med Admin: 4 (10/13/2017 11:07 AM)  Gregory Score: 8 (10/13/2017 10:35 AM)

## 2017-10-13 NOTE — DISCHARGE INSTRUCTIONS
Direct Laryngoscopy  Direct laryngoscopy is a procedure to look at the vocal cords. A laryngoscope is a rigid, hollow tube with a light attached. Using this tool, your healthcare provider can look behind your tongue and down your throat to your vocal cords. A tissue sample (biopsy) can be taken for study in a lab. Or a growth can be removed. Your healthcare provider can tell you more about your procedure depending on why its being done. This sheet gives you general information about what to expect.    Getting ready for the procedure  Prepare for the surgery as you have been instructed. Be sure to tell your healthcare provider about all medicines you take. This includes over-the-counter medicines. It also includes herbs and other supplements. You may need to stop taking some or all of them before surgery. Your healthcare provider will let you know. Also follow any directions youre given for not eating or drinking before surgery.  The day of the procedure  The procedure takes 30 to 60 minutes. You will likely go home the same day.  Before the procedure  Here is what to expect before the procedure begins:  · An IV line is put into a vein in your arm or hand. This line delivers fluids and medicines.  · You will be given medicine (anesthesia) to keep you pain free during surgery. This may be general anesthesia, which puts you in a state like deep sleep. Or you may have sedation, which makes you relaxed and drowsy. Local anesthesia may also be injected or sprayed into your throat to numb it.  During the procedure  Here is what to expect during the procedure:  · You will lie on your back on a table. The scope is put into your mouth and passed down into the throat.  · Your healthcare provider examines the larynx and surrounding areas with the scope. If needed, a biopsy is done using small tools put through the scope.  · If a growth is found, tools can be put through the scope to remove it.  After the procedure  You will  be taken to a room to wake up from the anesthesia. At first, your throat may be sore and scratchy. Your voice may be hoarse, making talking difficult. And it may be hard to swallow. You will receive medicine to control pain. When you are released to go home, have an adult family member or friend ready to drive you.  Recovering at home  Once home, follow any instructions you have been given. Be sure to:  · Take pain medicine as directed.  · Drink plenty of water as soon as you can swallow normally.  · Use throat lozenges as advised by your healthcare provider to help ease throat soreness.  · Rest your voice as instructed by your healthcare provider.  When to call your healthcare provider  After you get home, call the healthcare provider if you have any of the following:  · Chest pain or trouble breathing (call 911)  · Fever of 100.4°F (38°C) or higher, or as directed by your healthcare provider  · Problems swallowing that prevent you from eating or drinking  · Pain that does not go away even after taking pain medicine  · Severe nausea or vomiting  · Bloody vomit  · Cough that brings up blood   Follow-up  Within a few weeks, you will see your healthcare provider for a follow-up visit. During this visit, your provider will discuss the results of the procedure. Depending on what was found, you may need further evaluation and treatment.  Risks and possible complications   The risks of this procedure include:  · Bleeding  · Infection  · Gagging  · Vomiting  · Cuts in the mouth or throat  · Injury to the teeth  · Vocal cord injury  · Breathing problems  · Risks of anesthesia   Date Last Reviewed: 6/11/2015  © 4296-8230 [employer]. [address] [place] [address], [place] [address]. All rights reserved. This information is not intended as a substitute for professional medical care. Always follow your healthcare professional's instructions.

## 2017-10-13 NOTE — PLAN OF CARE
Pt vital signs wnl.  Pt pain controlled.  Pt with no nausea.  Back of throat pink and moist with no signs of bleeding.

## 2017-10-13 NOTE — NURSING TRANSFER
Nursing Transfer Note      10/13/2017     Transfer To: 523a    Transfer via stretcher    Transfer with cardiac monitoring    Transported by pct    Medicines sent: ivf    Chart send with patient: Yes    Notified: Friend called with room number    Patient reassessed at: 10/13/17

## 2017-10-14 VITALS
TEMPERATURE: 98 F | HEART RATE: 68 BPM | BODY MASS INDEX: 23.2 KG/M2 | RESPIRATION RATE: 18 BRPM | WEIGHT: 180.75 LBS | SYSTOLIC BLOOD PRESSURE: 110 MMHG | HEIGHT: 74 IN | DIASTOLIC BLOOD PRESSURE: 68 MMHG | OXYGEN SATURATION: 97 %

## 2017-10-14 PROCEDURE — 25000003 PHARM REV CODE 250: Performed by: STUDENT IN AN ORGANIZED HEALTH CARE EDUCATION/TRAINING PROGRAM

## 2017-10-14 PROCEDURE — 90686 IIV4 VACC NO PRSV 0.5 ML IM: CPT | Performed by: OTOLARYNGOLOGY

## 2017-10-14 PROCEDURE — 90471 IMMUNIZATION ADMIN: CPT | Performed by: OTOLARYNGOLOGY

## 2017-10-14 PROCEDURE — 63600175 PHARM REV CODE 636 W HCPCS: Performed by: OTOLARYNGOLOGY

## 2017-10-14 RX ORDER — AMOXICILLIN 500 MG/1
500 TABLET, FILM COATED ORAL EVERY 12 HOURS
Qty: 14 TABLET | Refills: 0 | Status: SHIPPED | OUTPATIENT
Start: 2017-10-14 | End: 2017-10-21

## 2017-10-14 RX ORDER — METHYLPREDNISOLONE 4 MG/1
TABLET ORAL
Qty: 1 PACKAGE | Refills: 0 | Status: SHIPPED | OUTPATIENT
Start: 2017-10-14 | End: 2017-11-04

## 2017-10-14 RX ADMIN — HYDROCODONE BITARTRATE AND ACETAMINOPHEN 15 ML: 7.5; 325 SOLUTION ORAL at 07:10

## 2017-10-14 RX ADMIN — HYDROCODONE BITARTRATE AND ACETAMINOPHEN 15 ML: 7.5; 325 SOLUTION ORAL at 11:10

## 2017-10-14 RX ADMIN — INFLUENZA A VIRUS A/MICHIGAN/45/2015 X-275 (H1N1) ANTIGEN (FORMALDEHYDE INACTIVATED), INFLUENZA A VIRUS A/HONG KONG/4801/2014 X-263B (H3N2) ANTIGEN (FORMALDEHYDE INACTIVATED), INFLUENZA B VIRUS B/PHUKET/3073/2013 ANTIGEN (FORMALDEHYDE INACTIVATED), AND INFLUENZA B VIRUS B/BRISBANE/60/2008 ANTIGEN (FORMALDEHYDE INACTIVATED) 0.5 ML: 15; 15; 15; 15 INJECTION, SUSPENSION INTRAMUSCULAR at 11:10

## 2017-10-14 RX ADMIN — HYDROCODONE BITARTRATE AND ACETAMINOPHEN 15 ML: 7.5; 325 SOLUTION ORAL at 03:10

## 2017-10-14 NOTE — PLAN OF CARE
Problem: Patient Care Overview  Goal: Plan of Care Review  Outcome: Ongoing (interventions implemented as appropriate)  Pt AAOX4, VSS. L throat swollen. HOB elevated. No family at bedside. Pt is resting quietly. Denies n/v, c/o minimal pain. Pain managed with PRN medications. Up ad alberta. Fall precautions maintained. Will resume care.

## 2017-10-14 NOTE — OP NOTE
DATE OF PROCEDURE:  10/13/2017    PREOPERATIVE DIAGNOSIS:  Left oropharyngeal mass.    POSTOPERATIVE DIAGNOSIS:  Left oropharyngeal mass.    SURGEONS:  1.  Fern Garcia M.D.  2.  Cecily Nagel M.D. (RES)    PROCEDURE PERFORMED:  Left tonsillectomy.    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  He was   previously biopsied in the Operating Room with reactive pathology.  However,   given continued weight loss and left oropharyngeal fullness, it was thought best   to take him to the Operating Room for direct laryngoscopy with tonsillectomy.    Risks, benefits and alternatives were explained to the patient and in spite of   these risks, he wished to proceed.    DESCRIPTION OF PROCEDURE:  The patient was taken to the Operating Room and   placed under general endotracheal anesthesia without complication.  Head of the   bed was turned 90 degrees to the patient's right.  The patient was then prepped   and draped.  A Stiven-Dl retractor was inserted in the patient's oral cavity   and left side was well inspected and found to have fullness.  The tonsil itself   appeared normal.  Bovie cautery was used to incise the anterior tonsillar   pillar.  With opening of the mucosa, there was found to be a spongy blood-soaked   lesion deep to the tonsil.  Tonsillectomy was performed superiorly to   inferiorly.  Compression of the mass continued to reveal bleeding; however,   there was no active arterial bleeding.  This was addressed with monopolar and   bipolar cautery.  The left tonsil was removed and sent for permanent pathology.    Valsalva did not produce any more bleeding; however, direct compression would   still produce some passive bleeding, so it was thought best at this time to   place Surgiflo into the tonsillar fossa and then oversewed the  mucosa on to   itself to remove the exposed area.  This was done with 3-0 chromic mattress   sutures.  Again, Valsalva was performed and there was no further evidence of   active bleeding.  The patient was then returned to Anesthesia care, extubated   and taken to the PACU for recovery without event.  So, at this point in time, it   will be best to watch the patient overnight in a monitored setting.    COMPLICATIONS:  None.    ESTIMATED BLOOD LOSS:  30 mL.    ANESTHESIA:  General.      MSC/LYRIC  dd: 10/13/2017 16:42:06 (CDT)  td: 10/13/2017 22:06:12 (CDT)  Doc ID   #[id number]  Job ID #[id number]    CC:

## 2017-10-14 NOTE — ASSESSMENT & PLAN NOTE
POD1 left tonsillectomy, admitted for overnight observation. Did well. Will send home with medrol dosepack, amox, PRN pain and nausea medicine. Follow up in 3 weeks with Dr Garcia.

## 2017-10-14 NOTE — DISCHARGE SUMMARY
[facility]  Otorhinolaryngology-Head & Neck Surgery  Discharge Summary      Patient Name: Saba Hansen  MRN: [id number]  Admission Date: 10/13/2017  Hospital Length of Stay: 0 days  Discharge Date and Time:  10/14/2017 9:56 AM  Attending Physician: Fern Garcia MD   Discharging Provider: Cecily Nagel MD  Primary Care Provider: Maria G Somers NP    HPI:   62 y.o. male admitted following planned left tonsillectomy. Did well overnight without any episodes of bleeding, dyspnea, or dysphagia. Tolerating diet and stable for discharge.   Procedure(s) (LRB):  TONSILLECTOMY (Left)      Indwelling Lines/Drains at time of discharge:   Lines/Drains/Airways          No matching active lines, drains, or airways        Hospital Course: No notes on file    Consults:     Significant Diagnostic Studies: none    Pending Diagnostic Studies:     None        Final Active Diagnoses:    Diagnosis Date Noted POA    PRINCIPAL PROBLEM:  Tonsillar mass [R22.0] 09/15/2017 Yes      Problems Resolved During this Admission:    Diagnosis Date Noted Date Resolved POA      Discharged Condition: good    Disposition: Home or Self Care    Follow Up:  Follow-up Information     Fern Garcia MD. Schedule an appointment as soon as possible for a visit in 3 weeks.    Specialty:  Otolaryngology  Why:  Post-op visit  Contact information:  [facility] [address]  [phone]                 Patient Instructions:     Diet general     Diet general     Weight bearing restrictions (specify)   Order Comments: Do not lift > 10 lb for 1 week. Slowly increase your activity to your baseline level. No stooping, straining, or strenuous exercise. However, walking is encouraged.     Call MD for:  severe uncontrolled pain     Call MD for:  persistent nausea and vomiting     Call MD for:  difficulty breathing, headache or visual disturbances     No dressing needed       Medications:  Reconciled Home Medications:    Current Discharge Medication List      START taking these medications    Details   amoxicillin (AMOXIL) 500 MG Tab Take 1 tablet (500 mg total) by mouth every 12 (twelve) hours.  Qty: 14 tablet, Refills: 0      hydrocodone-acetaminophen (HYCET) solution 7.5-325 mg/15mL Take 15 mLs by mouth every 4 (four) hours as needed for Pain.  Qty: 473 mL, Refills: 0      methylPREDNISolone (MEDROL DOSEPACK) 4 mg tablet use as directed  Qty: 1 Package, Refills: 0         CONTINUE these medications which have CHANGED    Details   ondansetron (ZOFRAN-ODT) 4 MG TbDL Take 2 tablets (8 mg total) by mouth every 8 (eight) hours as needed (nausea).  Qty: 20 tablet, Refills: 0         CONTINUE these medications which have NOT CHANGED    Details   fluticasone (FLONASE) 50 mcg/actuation nasal spray 2 sprays by Each Nare route once daily.  Qty: 2 Bottle, Refills: 3      tamsulosin (FLOMAX) 0.4 mg Cp24 Take 1 capsule (0.4 mg total) by mouth every evening.  Qty: 30 capsule, Refills: 12    Associated Diagnoses: Urinary retention; Encounter for Red catheter removal         STOP taking these medications       oxycodone-acetaminophen (PERCOCET)  mg per tablet Comments:   Reason for Stopping:             Time spent on the discharge of patient: 10 minutes    Cecily Nagel MD  Otorhinolaryngology-Head & Neck Surgery  [facility]

## 2017-10-14 NOTE — NURSING
Pt. Transportation has arrive to transport him to home.  Discharge instruction given, pt verbalizes understanding.  Ask if he could eat his lunch before his discharged.  Will put in for transport when pt finish eating.

## 2017-10-24 ENCOUNTER — OFFICE VISIT (OUTPATIENT)
Dept: UROLOGY | Facility: CLINIC | Age: 62
End: 2017-10-24
Payer: MEDICAID

## 2017-10-24 ENCOUNTER — LAB VISIT (OUTPATIENT)
Dept: LAB | Facility: HOSPITAL | Age: 62
End: 2017-10-24
Attending: NURSE PRACTITIONER
Payer: MEDICAID

## 2017-10-24 VITALS
BODY MASS INDEX: 23.23 KG/M2 | HEIGHT: 73 IN | HEART RATE: 92 BPM | DIASTOLIC BLOOD PRESSURE: 66 MMHG | WEIGHT: 175.25 LBS | SYSTOLIC BLOOD PRESSURE: 100 MMHG

## 2017-10-24 DIAGNOSIS — R33.9 URINARY RETENTION: ICD-10-CM

## 2017-10-24 DIAGNOSIS — N13.8 BPH WITH URINARY OBSTRUCTION: Primary | ICD-10-CM

## 2017-10-24 DIAGNOSIS — N40.1 BPH WITH URINARY OBSTRUCTION: Primary | ICD-10-CM

## 2017-10-24 DIAGNOSIS — N40.1 BPH WITH URINARY OBSTRUCTION: ICD-10-CM

## 2017-10-24 DIAGNOSIS — N13.8 BPH WITH URINARY OBSTRUCTION: ICD-10-CM

## 2017-10-24 LAB
BILIRUB SERPL-MCNC: NORMAL MG/DL
BLOOD URINE, POC: NORMAL
COLOR, POC UA: YELLOW
COMPLEXED PSA SERPL-MCNC: 3.8 NG/ML
GLUCOSE UR QL STRIP: NORMAL
KETONES UR QL STRIP: NORMAL
LEUKOCYTE ESTERASE URINE, POC: NORMAL
NITRITE, POC UA: NORMAL
PH, POC UA: 6
POC RESIDUAL URINE VOLUME: 0 ML (ref 0–100)
PROTEIN, POC: NORMAL
SPECIFIC GRAVITY, POC UA: 1.01
UROBILINOGEN, POC UA: NORMAL

## 2017-10-24 PROCEDURE — 51798 US URINE CAPACITY MEASURE: CPT | Mod: PBBFAC | Performed by: NURSE PRACTITIONER

## 2017-10-24 PROCEDURE — 99999 PR PBB SHADOW E&M-EST. PATIENT-LVL III: CPT | Mod: PBBFAC,,, | Performed by: NURSE PRACTITIONER

## 2017-10-24 PROCEDURE — 99214 OFFICE O/P EST MOD 30 MIN: CPT | Mod: S$PBB,,, | Performed by: NURSE PRACTITIONER

## 2017-10-24 PROCEDURE — 99213 OFFICE O/P EST LOW 20 MIN: CPT | Mod: PBBFAC | Performed by: NURSE PRACTITIONER

## 2017-10-24 PROCEDURE — 36415 COLL VENOUS BLD VENIPUNCTURE: CPT

## 2017-10-24 PROCEDURE — 84153 ASSAY OF PSA TOTAL: CPT

## 2017-10-24 PROCEDURE — 81002 URINALYSIS NONAUTO W/O SCOPE: CPT | Mod: PBBFAC | Performed by: NURSE PRACTITIONER

## 2017-10-24 NOTE — PATIENT INSTRUCTIONS
BPH (Enlarged Prostate)  The prostate is a gland at the base of the bladder. As some men get older, the prostate may get bigger in size. This problem is called benign prostatic hyperplasia (BPH). BPH puts pressure on the urethra. This is the tube that carries urine from the bladder to the penis. It may interfere with the flow of urine. It may also keep the bladder from emptying fully.    Symptoms of BPH include trouble starting urination and feeling as though the bladder isnt emptying all the way. It also includes a weak urine stream, dribbling and leaking of urine, and frequent and urgent urination (especially at night). BPH can increase the risk of urinary infections. It can also block off urine flow completely. If this occurs, a thin tube (catheter) may be passed into the bladder to help drain urine.  If symptoms are mild, no treatment may be needed right now. If symptoms are more severe, treatment is likely needed. The goal of treatment is to improve urine flow and reduce symptoms. Treatments can include medicine and procedures. Your healthcare provider will discuss treatment options with you as needed.  Home care  The following guidelines will help you care for yourself at home:  · Urinate as soon as you feel the urge. Don't try to hold your urine.  · Don't limit your fluid intake during the day. Drink 6 to 8 glasses of water or liquids a day. This prevents bacteria from building up in the bladder.  · Avoid drinking fluids after dinner to help reduce urination during the night.  · Avoid medicines that can worsen your symptoms. These include certain cold and allergy medicines and antidepressants. Diuretics used for high blood pressure can also worsen symptoms. Talk to your doctor about the medicines you take. Other choices may work better for you.  Prostate cancer screening  BPH does not increase the risk of prostate cancer. But because prostate cancer is a common cancer in men, screening is sometimes  recommended. This may help detect the cancer in its early stages when treatment is most effective. Factors that can increase the risk of prostate cancer include being -American or having a father or brother who had prostate cancer. A high-fat diet may also increase the risk of prostate cancer. Talk to your healthcare provider to see whether you should be screened for prostate cancer.  Follow-up care  Follow up with your healthcare provider, or as advised  To learn more, go to:  · National Kidney & Urologic Diseases Information Clearinghouse  kidney.niddk.nih.gov, 129.491.6411  When to seek medical advice  Call your healthcare provider right away if any of these occur:  · Fever of 100.4°F (38.0°C) or higher, or as advised  · Unable to pass urine for 8 hours  · Increasing pressure or pain in your bladder (lower abdomen)  · Blood in the urine  · Increasing low back pain, not related to injury  · Symptoms of urinary infection (increased urge to urinate, burning when passing urine, foul-smelling urine)  Date Last Reviewed: 7/1/2016  © 5636-6075 [employer]. [address] [place] [address]. All rights reserved. This information is not intended as a substitute for professional medical care. Always follow your healthcare professional's instructions.        Prostate Problems and Related Urinary Symptoms    Many men have problems with the prostate at some time in their lives. The prostate gland is part of the male reproductive system. Its located just below the bladder. The prostate surrounds the urethra (the tube that carries urine and semen out of the body). The main function of the prostate gland is to add fluid to the semen. When problems occur in the prostate, the bladder and urethra are often affected as well. The most common prostate problems are described below.  BPH  BPH (benign prostatic hyperplasia) develops when changing hormone levels cause the prostate to grow larger. This often  starts around age 50. Excess tissue can block the urethra, making it harder for urine to flow. The enlarged prostate can also press on the bladder, so you may need to urinate more often. Other symptoms include straining during urination, a weak urine stream, urinating more at night, incontinence, dribbling at the end of urination, and feeling that the bladder isnt emptying all the way. Note that BPH is not cancer and does not cause cancer.  How BPH affects the bladder  Pushing to urinate through a narrowed urethra can cause the bladder walls to thicken or stretch out of shape. A stretched bladder may have problems emptying all the way. If urine stays in the bladder longer than it should, you may develop an infection or bladder stones. Also, the kidneys cant drain properly into a bladder that doesnt empty completely. This can lead to kidney failure. Pressure from urine buildup can also cause leaking of urine (called overflow incontinence).  Other prostate problems  · Prostatitis is an infection or inflammation that causes the prostate to become painful and swollen. The swelling narrows the urethra and can block the bladder neck. Prostatitis can cause a burning sensation during urination. You may also feel pressure or pain in the genital area. In some cases, prostatitis can cause fever and chills, and can make you very sick.  · Cancer occurs when abnormal cells form a tumor (a lump of cells that grow uncontrolled). Some tumors can be felt during a physical exam, others cant. Prostate cancer often causes no symptoms at all, especially in its early stages. Prostate symptoms are more likely to be caused by a problem that is NOT cancer.   Date Last Reviewed: 1/1/2017 © 2000-2017 [employer]. [address] [place] [address], [place] [address]. All rights reserved. This information is not intended as a substitute for professional medical care. Always follow your healthcare professional's  instructions.        Tamsulosin capsules  What is this medicine?  TAMSULOSIN (olson JOSE heri sin) is used to treat enlargement of the prostate gland in men, a condition called benign prostatic hyperplasia or BPH. It is not for use in women. It works by relaxing muscles in the prostate and bladder neck. This improves urine flow and reduces BPH symptoms.  How should I use this medicine?  Take this medicine by mouth about 30 minutes after the same meal every day. Follow the directions on the prescription label. Swallow the capsules whole with a glass of water. Do not crush, chew, or open capsules. Do not take your medicine more often than directed. Do not stop taking your medicine unless your doctor tells you to.  Talk to your pediatrician regarding the use of this medicine in children. Special care may be needed.  What side effects may I notice from receiving this medicine?  Side effects that you should report to your doctor or health care professional as soon as possible:  · allergic reactions like skin rash or itching, hives, swelling of the lips, mouth, tongue, or throat  · breathing problems  · change in vision  · feeling faint or lightheaded  · irregular heartbeat  · prolonged or painful erection  · weakness  Side effects that usually do not require medical attention (report to your doctor or health care professional if they continue or are bothersome):  · back pain  · change in sex drive or performance  · constipation, nausea or vomiting  · cough  · drowsy  · runny or stuffy nose  · trouble sleeping  What may interact with this medicine?  · cimetidine  · fluoxetine  · ketoconazole  · medicines for erectile disfunction like sildenafil, tadalafil, vardenafil  · medicines for high blood pressure  · other alpha-blockers like alfuzosin, doxazosin, phentolamine, phenoxybenzamine, prazosin, terazosin  · warfarin  What if I miss a dose?  If you miss a dose, take it as soon as you can. If it is almost time for your next  dose, take only that dose. Do not take double or extra doses. If you stop taking your medicine for several days or more, ask your doctor or health care professional what dose you should start back on.  Where should I keep my medicine?  Keep out of the reach of children.  Store at room temperature between 15 and 30 degrees C (59 and 86 degrees F). Throw away any unused medicine after the expiration date.  What should I tell my health care provider before I take this medicine?  They need to know if you have any of the following conditions:  · advanced kidney disease  · advanced liver disease  · low blood pressure  · prostate cancer  · an unusual or allergic reaction to tamsulosin, sulfa drugs, other medicines, foods, dyes, or preservatives  · pregnant or trying to get pregnant  · breast-feeding  What should I watch for while using this medicine?  Visit your doctor or health care professional for regular check ups. You will need lab work done before you start this medicine and regularly while you are taking it. Check your blood pressure as directed. Ask your health care professional what your blood pressure should be, and when you should contact him or her.  This medicine may make you feel dizzy or lightheaded. This is more likely to happen after the first dose, after an increase in dose, or during hot weather or exercise. Drinking alcohol and taking some medicines can make this worse. Do not drive, use machinery, or do anything that needs mental alertness until you know how this medicine affects you. Do not sit or stand up quickly. If you begin to feel dizzy, sit down until you feel better. These effects can decrease once your body adjusts to the medicine.  Contact your doctor or health care professional right away if you have an erection that lasts longer than 4 hours or if it becomes painful. This may be a sign of a serious problem and must be treated right away to prevent permanent damage.  If you are thinking of  having cataract surgery, tell your eye surgeon that you have taken this medicine.  NOTE:This sheet is a summary. It may not cover all possible information. If you have questions about this medicine, talk to your doctor, pharmacist, or health care provider. Copyright© 2017 [employer]

## 2017-10-24 NOTE — PROGRESS NOTES
Subjective:       Patient ID: Saba Hansen is a 62 y.o. male.    Chief Complaint: Benign Prostatic Hypertrophy    Saba Hansen is a 62 y.o. Male new with history of urinary retention.  He was last seen in clinic 09/21/2017 due to Urinary Retention following ENT Surgery on 09/15/2017.  Red removed and had no issues with urination  He not taking Flomax, but did take it two weeks ago before under going another surgery.  This time had no issues with urination.    FOS can be slow to start but overall he pleased  Nocturia has been 0-1x   No family history of Prostate Cancer  Denies any issues with ED      .              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  No pertinent family history.      Social History    Marital status: Single              Spouse name:                       Years of education:                 Number of children:               Occupational History    None on file    Social History Main Topics    Smoking status: Never Smoker                                                                Smokeless tobacco: Never Used                        Alcohol use: No                 Comment: rarely    Drug use: No              Sexual activity: Not on file          Other Topics            Concern    None on file    Social History Narrative    None on file        Allergies:  Review of patient's allergies indicates no known allergies.    Medications:  Current Outpatient Prescriptions:   fluticasone (FLONASE) 50 mcg/actuation nasal spray, 2 sprays by Each Nare route once daily., Disp: 2 Bottle, Rfl: 3  ondansetron (ZOFRAN-ODT) 8 MG TbDL, Take 1 tablet (8 mg total) by mouth every 12 (twelve) hours as needed., Disp: 20 tablet, Rfl: 0  oxycodone-acetaminophen (PERCOCET)  mg per tablet, Take 1 tablet by mouth every 6 (six) hours as needed for Pain., Disp: 30 tablet, Rfl: 0                      Review of  Systems   Constitutional: Negative.  Negative for activity change, appetite change and fever.   HENT: Negative.  Negative for facial swelling and trouble swallowing.    Eyes: Negative.    Respiratory: Negative.  Negative for shortness of breath.    Cardiovascular: Negative.  Negative for chest pain and palpitations.   Gastrointestinal: Negative for abdominal pain, constipation, diarrhea, nausea and vomiting.   Genitourinary: Positive for nocturia. Negative for difficulty urinating, dysuria, enuresis, flank pain, frequency, genital sores, hematuria, penile pain, scrotal swelling, testicular pain and urgency.        Very pleased with how he urinates.     Musculoskeletal: Negative for back pain, gait problem and neck stiffness.   Skin: Negative.  Negative for wound.   Neurological: Negative for dizziness, tremors, seizures, syncope, speech difficulty, light-headedness and headaches.   Hematological: Does not bruise/bleed easily.   Psychiatric/Behavioral: Negative for confusion and hallucinations. The patient is not nervous/anxious.        Objective:      Physical Exam   Nursing note and vitals reviewed.  Constitutional: He is oriented to person, place, and time. Vital signs are normal. He appears well-developed and well-nourished. He is active and cooperative.  Non-toxic appearance. He does not have a sickly appearance.   Urine dipped clear of infection in the office today.  PVR in the office today by the nurse was 0.     HENT:   Head: Normocephalic and atraumatic.   Right Ear: External ear normal.   Left Ear: External ear normal.   Nose: Nose normal.   Mouth/Throat: Mucous membranes are normal.   Eyes: Conjunctivae and lids are normal. No scleral icterus.   Neck: Trachea normal, normal range of motion and full passive range of motion without pain. Neck supple. No JVD present. No tracheal deviation present.   Cardiovascular: Normal rate, regular rhythm, S1 normal and S2 normal.    Pulmonary/Chest: Effort normal and  breath sounds normal. No respiratory distress. He exhibits no tenderness.   Abdominal: Soft. Normal appearance and bowel sounds are normal. There is no hepatosplenomegaly. There is no tenderness. There is no rebound, no guarding and no CVA tenderness.   Genitourinary: Rectum normal, testes normal and penis normal. Rectal exam shows no external hemorrhoid, no mass and no tenderness. Prostate is enlarged. Prostate is not tender. Right testis shows no mass, no swelling and no tenderness. Left testis shows no mass, no swelling and no tenderness. Uncircumcised. No phimosis, paraphimosis, hypospadias, penile erythema or penile tenderness. No discharge found.       Musculoskeletal: Normal range of motion.   Lymphadenopathy: No inguinal adenopathy noted on the right or left side.   Neurological: He is alert and oriented to person, place, and time. He has normal strength.   Skin: Skin is warm, dry and intact.     Psychiatric: He has a normal mood and affect. His behavior is normal. Judgment and thought content normal.       Assessment:       1. BPH with urinary obstruction        Plan:         I spent 25 minutes with the patient of which more than half was spent in direct consultation with the patient in regards to our treatment and plan.    Education and recommendations of today's plan of care including home remedies.  We discussed in office findings;  Discussed BPH and his LUTS; benefits,risks,se of daily flomax.  Diet modifications; reducing bladder irritants.  He ok with how he urinates.  He had PSA today is pending; will notify of results  If PSA normal then RTC one year with PSA

## 2017-11-01 ENCOUNTER — OFFICE VISIT (OUTPATIENT)
Dept: OTOLARYNGOLOGY | Facility: CLINIC | Age: 62
End: 2017-11-01
Payer: MEDICAID

## 2017-11-01 ENCOUNTER — LAB VISIT (OUTPATIENT)
Dept: LAB | Facility: HOSPITAL | Age: 62
End: 2017-11-01
Attending: OTOLARYNGOLOGY
Payer: MEDICAID

## 2017-11-01 VITALS
BODY MASS INDEX: 23.65 KG/M2 | WEIGHT: 179.25 LBS | DIASTOLIC BLOOD PRESSURE: 75 MMHG | TEMPERATURE: 97 F | HEART RATE: 63 BPM | SYSTOLIC BLOOD PRESSURE: 119 MMHG

## 2017-11-01 DIAGNOSIS — R22.1 PARAPHARYNGEAL SPACE MASS: ICD-10-CM

## 2017-11-01 DIAGNOSIS — J33.9 NASAL POLYPOSIS: ICD-10-CM

## 2017-11-01 DIAGNOSIS — R22.1 PARAPHARYNGEAL SPACE MASS: Primary | ICD-10-CM

## 2017-11-01 LAB
CREAT SERPL-MCNC: 0.8 MG/DL
EST. GFR  (AFRICAN AMERICAN): >60 ML/MIN/1.73 M^2
EST. GFR  (NON AFRICAN AMERICAN): >60 ML/MIN/1.73 M^2

## 2017-11-01 PROCEDURE — 82565 ASSAY OF CREATININE: CPT

## 2017-11-01 PROCEDURE — 99024 POSTOP FOLLOW-UP VISIT: CPT | Mod: ,,, | Performed by: OTOLARYNGOLOGY

## 2017-11-01 PROCEDURE — 99999 PR PBB SHADOW E&M-EST. PATIENT-LVL III: CPT | Mod: PBBFAC,,, | Performed by: OTOLARYNGOLOGY

## 2017-11-01 PROCEDURE — 36415 COLL VENOUS BLD VENIPUNCTURE: CPT

## 2017-11-01 PROCEDURE — 99213 OFFICE O/P EST LOW 20 MIN: CPT | Mod: PBBFAC | Performed by: OTOLARYNGOLOGY

## 2017-11-01 NOTE — PROGRESS NOTES
Chief Complaint   Patient presents with    post op/ throat hurts when yarning         62 y.o. male presents with longstanding history of loss of smell starting in 2006. Had a nasal surgery 8 years ago at [facility] for polyps. Was doing better but has noted increased nasal congestion and inability to breathe through his nose. He also has some coughing. No allergy to aspirin or asthma diagnosis. Currently on Flonase. CT sinus showed left tonsillar mass which was confirmed on dedicated CT neck. DL with biopsy of this area demonstrated reactive tissue, but given his continued fullness and weight loss he then underwent left tonsillectomy. 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 He is healing well from the surgery, but still notes some pain with yawning. He also reports that he was able to smell three times after recent medrol dose pack given to him after surgery.            Review of Systems     Constitutional: Negative for fatigue and unexpected weight change.   HENT: per HPI.   Eyes: Negative for visual disturbance.   Respiratory: Negative for shortness of breath, hemoptysis  Cardiovascular: Negative for chest pain and palpitations.   Genitourinary: Negative for dysuria and difficulty urinating.   Musculoskeletal: Negative for decreased ROM, back pain.   Skin: Negative for rash, sunburn, itching.   Neurological: Negative for dizziness and seizures.   Hematological: Negative for adenopathy. Does not bruise/bleed easily.   Psychiatric/Behavioral: Negative for agitation. The patient is not nervous/anxious.   Endocrine: Negative for rapid weight loss/weight gain, heat/cold intolerance.     Past Medical History:   Diagnosis Date    Asthma        Past Surgical History:   Procedure Laterality Date    AORTOPEXY W/ MLB      NASAL SINUS SURGERY         family history is not on file.    Pt  reports that he has never smoked. He has never used smokeless tobacco. He reports that he does not drink  alcohol or use drugs.    Review of patient's allergies indicates:  No Known Allergies     Physical Exam    Vitals:    11/01/17 1011   BP: 119/75   Pulse: 63   Temp: 96.7 °F (35.9 °C)     Body mass index is 23.65 kg/m².    Physical Exam   Constitutional: He is oriented to person, place, and time. He appears well-developed and well-nourished. No distress.   HENT:   Head: Normocephalic and atraumatic.   Right Ear: Hearing, tympanic membrane, external ear and ear canal normal. Tympanic membrane mobility is normal. No middle ear effusion. No decreased hearing is noted.   Left Ear: Hearing, tympanic membrane, external ear and ear canal normal. Tympanic membrane mobility is normal.  No middle ear effusion. No decreased hearing is noted.   Nose: Nose normal. Right sinus exhibits no maxillary sinus tenderness and no frontal sinus tenderness. Left sinus exhibits no maxillary sinus tenderness and no frontal sinus tenderness.   Mouth/Throat: Uvula is midline and mucous membranes are normal. Posterior oropharyngeal edema present.       Bilateral nasal cavities with polypoid change extending posteriorly. No purulent fluid seen.   Eyes: Conjunctivae, EOM and lids are normal. Pupils are equal, round, and reactive to light. Right eye exhibits no discharge. Left eye exhibits no discharge.   Neck: Trachea normal, normal range of motion and phonation normal. Neck supple. No tracheal tenderness present. No tracheal deviation, no edema and no erythema present. No thyroid mass and no thyromegaly present.   Cardiovascular: Normal heart sounds.    Pulmonary/Chest: Breath sounds normal. No stridor.   Abdominal: Soft.   Lymphadenopathy:     He has no cervical adenopathy.   Neurological: He is alert and oriented to person, place, and time.   Skin: Skin is warm and dry. No rash noted. He is not diaphoretic. No erythema. No pallor.   Psychiatric: He has a normal mood and affect.   Nursing note and vitals reviewed.      Studies reviewed:  CT  Sinus 9/6/17-  Impression         1) Extensive opacification of the nasal sinuses with polypoid opacity extending into the nasal cavity as described above. 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    2) Postsurgical changes of functional endoscopic sinus surgery. The left medial antrostomy is narrowed but patent. Right is completely opacified.    3) 3.1 cm soft tissue mass at the level of the left palatine tonsil, incompletely visualized and not well characterized on this noncontrasted study, but concerning for malignancy. Further evaluation with direct visualization is advised and further imaging with contrast enhanced CT if warranted.       CT Neck 9/12/17:   Impression         Ill-defined 2.9 cm area of soft tissue lesion in the left tonsillar area, similar to CT 09/06/2017, concerning for squamous cell carcinoma with an ipsilateral 1.3 cm level III cervical node, could indicate regional spread of tumor.     Extensive sinus disease compatible with allergic fungal sinusitis and associated sinonasal polyposis as seen on recent CT sinus study.     Pathology 9/15/17:  FINAL PATHOLOGIC DIAGNOSIS  1, 2. LEFT TONSIL MASS (BIOPSY):  -No neoplasm identified  -Lymphoid hyperplasia and reactive changes  -Interpretation limited by tissue artifacts    Pathology 10/13/17:  FINAL PATHOLOGIC DIAGNOSIS  TONSIL WITH FOLLICULAR HYPERPLASIA AND REACTIVE CHANGES.  NO MALIGNANCY IDENTIFIED.      Assessment     1. Parapharyngeal space mass          Plan  In summary, Mr. Hansen is a 62 year old male with recurrent nasal polyposis. During recent workup was found to have left parapharyngeal space mass. 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 Will obtain MRI for better evaluation. I will call him once study complete with further recommendations. Follow up with Dr. Botello for nasal polyposis. All  questions were answered and he is in agreement with the plan.

## 2017-11-07 ENCOUNTER — HOSPITAL ENCOUNTER (OUTPATIENT)
Dept: RADIOLOGY | Facility: HOSPITAL | Age: 62
Discharge: HOME | End: 2017-11-07
Attending: OTOLARYNGOLOGY
Payer: MEDICAID

## 2017-11-07 DIAGNOSIS — R22.1 PARAPHARYNGEAL SPACE MASS: ICD-10-CM

## 2017-11-07 PROCEDURE — 70543 MRI ORBT/FAC/NCK W/O &W/DYE: CPT | Mod: TC

## 2017-11-07 PROCEDURE — A9585 GADOBUTROL INJECTION: HCPCS | Performed by: OTOLARYNGOLOGY

## 2017-11-07 PROCEDURE — 25500020 PHARM REV CODE 255: Performed by: OTOLARYNGOLOGY

## 2017-11-07 PROCEDURE — 70543 MRI ORBT/FAC/NCK W/O &W/DYE: CPT | Mod: 26,,, | Performed by: RADIOLOGY

## 2017-11-07 RX ORDER — GADOBUTROL 604.72 MG/ML
8 INJECTION INTRAVENOUS
Status: COMPLETED | OUTPATIENT
Start: 2017-11-07 | End: 2017-11-07

## 2017-11-07 RX ADMIN — GADOBUTROL 8 ML: 604.72 INJECTION INTRAVENOUS at 02:11

## 2017-11-30 ENCOUNTER — OFFICE VISIT (OUTPATIENT)
Dept: OTOLARYNGOLOGY | Facility: CLINIC | Age: 62
End: 2017-11-30
Payer: MEDICAID

## 2017-11-30 VITALS
DIASTOLIC BLOOD PRESSURE: 78 MMHG | HEART RATE: 85 BPM | WEIGHT: 179.44 LBS | SYSTOLIC BLOOD PRESSURE: 112 MMHG | BODY MASS INDEX: 23.68 KG/M2

## 2017-11-30 DIAGNOSIS — J34.2 DEVIATED NASAL SEPTUM: ICD-10-CM

## 2017-11-30 DIAGNOSIS — H61.23 IMPACTED CERUMEN OF BOTH EARS: ICD-10-CM

## 2017-11-30 DIAGNOSIS — J32.4 CHRONIC PANSINUSITIS: Primary | ICD-10-CM

## 2017-11-30 DIAGNOSIS — J33.9 NASAL POLYPOSIS: ICD-10-CM

## 2017-11-30 PROCEDURE — 99213 OFFICE O/P EST LOW 20 MIN: CPT | Mod: PBBFAC | Performed by: OTOLARYNGOLOGY

## 2017-11-30 PROCEDURE — 99214 OFFICE O/P EST MOD 30 MIN: CPT | Mod: 25,24,S$PBB, | Performed by: OTOLARYNGOLOGY

## 2017-11-30 PROCEDURE — 69210 REMOVE IMPACTED EAR WAX UNI: CPT | Mod: 50,PBBFAC | Performed by: OTOLARYNGOLOGY

## 2017-11-30 PROCEDURE — 31231 NASAL ENDOSCOPY DX: CPT | Mod: PBBFAC | Performed by: OTOLARYNGOLOGY

## 2017-11-30 PROCEDURE — 99999 PR PBB SHADOW E&M-EST. PATIENT-LVL III: CPT | Mod: PBBFAC,,, | Performed by: OTOLARYNGOLOGY

## 2017-11-30 NOTE — LETTER
2017    Fern Garcia MD  [address] [place] [address]           OTOLARYNGOLOGY AND COMMUNICATION SCIENCES    Titi Pinedo MD, FACS          SURGICAL AND MEDICAL DISEASES OF HEAD AND NECK  MD Titi Peters MD, FACS  Antony Vazquez III, MD    OTOLOGY, NEUROTOLOGY and SKULL-BASE SURGERY  Julien Ortega MD, FACS  Mark Bautista MD, Director    PEDIATRIC OTOLARYNGOLOGY & OTOLOGY  BLAIRE Akins MD, FAAP  Noa Felix MD, FACS    FACIAL PLASTIC and RECONSTRUCTIVE SURGERY  BELINDA Bunch III, MD, FACS    RHINOLOGY and SINUS SURGERY  Rm Botello MD, MPH, FACS  Director   BELINDA Bunch III, MD, FACS    LARYNGOLOGY  Emeka Schilling MD    SPEECH-LANGUAGE PATHOLOGY  Gila Christine, PhD, [facility]-SLP  Daina Temple, MS, CCC-SLP  Laurie Dacosta, MS, CCC-SLP  Allison Sierra MA, [facility]-SLP    AUDIOLOGY SECTION  Lisa Toscano, MS, CCC-A  SILVERIO Angel, CCC-A  Derrek Sosa, CCC-A  Derrek Casey, CCC-A  Mauricio Colon Jr., SILVERIO, CCA-A  SILVERIO Juarez, CCC-A  Derrek Mike, CCC-A    [facility]  Head and Neck Surgical Oncology  FROY Abebe, FNP-C  Pedatric Otolaryngology  FROY Marcelino, PNP-C       Re:  Saba Hansen  :  1955    Dear Dr. Garcia,    Thank you for referring your patient, Saba Hansen, to us for evaluation and treatment.  I have enclosed a copy of my clinic note for your review and records.  If you have any questions please do not hesitate to contact our office.     Thank you for allowing me to participate in the care of your patient.    Sincerely,        Rm Botello MD, MPH, FACS    Director, Rhinology and Sinus Surgery  Department of Otorhinolaryngology  [facility]    Encl:  Progress note       [facility] [address] [place] [address]  phone [phone]  fax [phone]   [email or web address]

## 2017-11-30 NOTE — Clinical Note
December 1, 2017      Fern Garcia MD  [address]  [facility] [address]           [facility]  [address]  [facility] [address]  Phone: [phone]  Fax: [phone]          Patient: Saba Hansen   MR Number: [id number]   YOB: 1955   Date of Visit: 11/30/2017       Dear Dr. Fern Garcia:    Thank you for referring Saba Hansen to me for evaluation. Attached you will find relevant portions of my assessment and plan of care.    If you have questions, please do not hesitate to call me. I look forward to following Saba Hansen along with you.    Sincerely,    Rm Botello MD    Enclosure  CC:  No Recipients    If you would like to receive this communication electronically, please contact [email or web address] or [phone] to request more information on [facility] Link access.    For providers and/or their staff who would like to refer a patient to [facility], please contact us through our one-stop-shop provider referral line, [facility], at [phone].    If you feel you have received this communication in error or would no longer like to receive these types of communications, please e-mail [email or web address]

## 2017-11-30 NOTE — PROGRESS NOTES
Subjective:      Saba Hansen is a 62 y.o. male who was referred to me by Dr. Fern Garcia in consultation for nasal polyps.    He relates a long history for several years of chronic nasal blockage, posterior nasal drainage, throat clearing and hyposmia.  He notes associated runny nose and occasional shortness of breath and wheezing.  He has used Flonase without improvement and has also been given 2 courses of antibiotics this year, most recently amoxicillin and a medrol dose pack.  He denies reflux or ear symptoms, and has not had allergy testing.  He recalls having some sort of sinus surgery at [facility] many years ago which didn't help, and otherwise denies prior nasal trauma.    SNOT-22 score = 52, NOSE score = 85%    Global QOL = 25%    Days missed = Less than 5      Past Medical History  He has a past medical history of Asthma.    Past Surgical History  He has a past surgical history that includes Nasal sinus surgery and Aortopexy w/ MLB.    Family History  His family history is not on file.    Social History  He reports that he has never smoked. He has never used smokeless tobacco. He reports that he does not drink alcohol or use drugs.    Allergies  He has No Known Allergies.    Medications   He has a current medication list which includes the following prescription(s): fluticasone, ondansetron, and tamsulosin.    Review of Systems  Review of Systems   Constitutional: Negative for fatigue, fever and unexpected weight change.   HENT: Positive for nosebleeds, sinus pressure, sore throat and tinnitus. Negative for congestion, dental problem, ear discharge, ear pain, facial swelling, hearing loss, hoarse voice, postnasal drip, rhinorrhea, trouble swallowing and voice change.    Eyes: Negative for photophobia, discharge, itching and visual disturbance.   Respiratory: Positive for cough, shortness of breath and wheezing. Negative for apnea.    Cardiovascular: Negative for chest pain and  palpitations.   Gastrointestinal: Negative for abdominal pain, nausea and vomiting.   Endocrine: Negative for cold intolerance and heat intolerance.   Genitourinary: Negative for difficulty urinating.   Musculoskeletal: Negative for arthralgias, back pain, myalgias and neck pain.   Skin: Negative for rash.   Allergic/Immunologic: Negative for environmental allergies and food allergies.   Neurological: Negative for dizziness, seizures, syncope, weakness and headaches.   Hematological: Negative for adenopathy. Does not bruise/bleed easily.   Psychiatric/Behavioral: Negative for decreased concentration, dysphoric mood and sleep disturbance. The patient is not nervous/anxious.           Objective:     /78   Pulse 85   Wt 81.4 kg (179 lb 7.3 oz)   BMI 23.68 kg/m²        Constitutional:   He appears well-developed. He is cooperative. Normal speech.  No hoarse voice.      Head:  Normocephalic. Salivary glands normal.  Facial strength is normal.      Ears:    Right Ear: No drainage or tenderness. Tympanic membrane is not perforated. Tympanic membrane mobility is normal. No middle ear effusion. No decreased hearing is noted.   Left Ear: No drainage or tenderness. Tympanic membrane is not perforated. Tympanic membrane mobility is normal.  No middle ear effusion. No decreased hearing is noted.     Nose:  Polyps present. No mucosal edema, rhinorrhea or septal deviation. No epistaxis. Turbinates normal, no turbinate masses and no turbinate hypertrophy.  Right sinus exhibits no maxillary sinus tenderness and no frontal sinus tenderness. Left sinus exhibits no maxillary sinus tenderness and no frontal sinus tenderness.     Mouth/Throat  Oropharynx clear and moist without lesions or asymmetry and normal uvula midline. He does not have dentures. Normal dentition. No oral lesions or mucous membrane lesions. No oropharyngeal exudate or posterior oropharyngeal erythema. Mirror exam not performed due to patient tolerance.   Mirror exam not performed due to patient tolerance.      Neck:  Neck normal without thyromegaly masses, asymmetry, normal tracheal structure, crepitus, and tenderness, thyroid normal, trachea normal and no adenopathy. Normal range of motion present.     He has no cervical adenopathy.     Cardiovascular:   Regular rhythm.      Pulmonary/Chest:   Effort normal.     Psychiatric:   He has a normal mood and affect. His speech is normal and behavior is normal.     Neurological:   No cranial nerve deficit.     Skin:   No rash noted.       Procedure    Cerumen impaction removed.  See procedure note.    Nasal endoscopy performed.  See procedure note.     Left nasal valve     Left MT obscured by mucus and polyps     Left posterior nasal cavity polyps     Right nasal valve     Right MT obscured by polyps     Right ET        Data Reviewed    WBC (K/uL)   Date Value   09/11/2017 10.91     Eosinophil% (%)   Date Value   09/11/2017 26.5 (H)     Eos # (K/uL)   Date Value   09/11/2017 2.9 (H)     Platelets (K/uL)   Date Value   09/11/2017 216     Glucose (mg/dL)   Date Value   09/11/2017 71     No results found for: IGE    Lab Results   Component Value Date    WBC 10.91 09/11/2017    HGB 15.0 09/11/2017    HCT 41 09/16/2017    MCV 85 09/11/2017     09/11/2017     CMP  Sodium   Date Value Ref Range Status   09/11/2017 143 136 - 145 mmol/L Final     Potassium   Date Value Ref Range Status   09/11/2017 4.0 3.5 - 5.1 mmol/L Final     Chloride   Date Value Ref Range Status   09/11/2017 107 95 - 110 mmol/L Final     CO2   Date Value Ref Range Status   09/11/2017 27 23 - 29 mmol/L Final     Glucose   Date Value Ref Range Status   09/11/2017 71 70 - 110 mg/dL Final     BUN, Bld   Date Value Ref Range Status   09/11/2017 13 8 - 23 mg/dL Final     Creatinine   Date Value Ref Range Status   11/01/2017 0.8 0.5 - 1.4 mg/dL Final     Calcium   Date Value Ref Range Status   09/11/2017 9.4 8.7 - 10.5 mg/dL Final     Total Protein   Date  Value Ref Range Status   09/11/2017 7.5 6.0 - 8.4 g/dL Final     Albumin   Date Value Ref Range Status   09/11/2017 3.4 (L) 3.5 - 5.2 g/dL Final     Total Bilirubin   Date Value Ref Range Status   09/11/2017 0.5 0.1 - 1.0 mg/dL Final     Comment:     For infants and newborns, interpretation of results should be based  on gestational age, weight and in agreement with clinical  observations.  Premature Infant recommended reference ranges:  Up to 24 hours.............<8.0 mg/dL  Up to 48 hours............<12.0 mg/dL  3-5 days..................<15.0 mg/dL  6-29 days.................<15.0 mg/dL       Alkaline Phosphatase   Date Value Ref Range Status   09/11/2017 99 55 - 135 U/L Final     AST   Date Value Ref Range Status   09/11/2017 18 10 - 40 U/L Final     ALT   Date Value Ref Range Status   09/11/2017 19 10 - 44 U/L Final     Anion Gap   Date Value Ref Range Status   09/11/2017 9 8 - 16 mmol/L Final     eGFR if    Date Value Ref Range Status   11/01/2017 >60.0 >60 mL/min/1.73 m^2 Final     eGFR if non    Date Value Ref Range Status   11/01/2017 >60.0 >60 mL/min/1.73 m^2 Final     Comment:     Calculation used to obtain the estimated glomerular filtration  rate (eGFR) is the CKD-EPI equation.          I independently reviewed the images of the MRI orbit dated 11/7/17. Pertinent findings include diffuse complete opacification of nearly all sinuses without bony erosion.       Assessment:     1. Chronic pansinusitis    2. Nasal polyposis    3. Deviated nasal septum    4. Impacted cerumen of both ears         Plan:     I had a long discussion with the patient regarding his condition and the further workup and management options.  He would benefit from endoscopic sinus surgery and septoplasty for the treatment of his condition.  This would include all sinuses and would be bilateral.  Inferior turbinate reduction would be included.  I discussed the risks, benefits and alternatives to surgery  with the patient, as well as the expected postoperative course.  I gave him the opportunity to ask questions and I answered all of them.  I provided relevant printed information on his condition for him to review at home.  Same-day discharge is anticipated.  He may have anesthesia triage by telephone.   The surgery will be tentatively scheduled for December 18.  He will need to return for a postoperative visit 1 week after surgery.    Return for surgery.

## 2017-12-01 NOTE — PROCEDURES
Nasal/sinus endoscopy  Date/Time: 12/1/2017 10:00 AM  Performed by: FREDRICK BAILEY  Authorized by: FREDRICK BAILEY     Consent Done?:  Yes (Verbal)  Anesthesia:     Local anesthetic:  Lidocaine 4% and Matthias-Synephrine 1/2%    Patient tolerance:  Patient tolerated the procedure well with no immediate complications  Nose:     Procedure Performed:  Nasal Endoscopy  External:      No external nasal deformity  Intranasal:      Mucosa polyps     Mucosa ulcers not present     No mucosa lesions present     Turbinates not enlarged     Septum gross deformity  Nasopharynx:      No mucosa lesions     Adenoids not present     Posterior choanae patent     Eustachian tube patent     Diffuse grade 3 polyps bilaterally.  Copious nonpurulent exudate.  Leftward septal spur.

## 2017-12-01 NOTE — PROCEDURES
Ear Cerumen Removal  Date/Time: 12/1/2017 10:07 AM  Performed by: FREDRICK BAILEY  Authorized by: FREDRICK BAILEY     Consent Done?:  Yes (Verbal)    Local anesthetic:  None  Location details:  Both ears  Procedure type: curette    Cerumen  Removal Results:  Cerumen completely removed  Patient tolerance:  Patient tolerated the procedure well with no immediate complications

## 2017-12-04 ENCOUNTER — TELEPHONE (OUTPATIENT)
Dept: OTOLARYNGOLOGY | Facility: CLINIC | Age: 62
End: 2017-12-04

## 2017-12-04 DIAGNOSIS — J33.9 NASAL POLYPOSIS: ICD-10-CM

## 2017-12-04 DIAGNOSIS — J32.4 CHRONIC PANSINUSITIS: Primary | ICD-10-CM

## 2017-12-04 DIAGNOSIS — H61.23 IMPACTED CERUMEN OF BOTH EARS: ICD-10-CM

## 2017-12-04 DIAGNOSIS — J45.909 MILD ASTHMA WITHOUT COMPLICATION, UNSPECIFIED WHETHER PERSISTENT: Primary | ICD-10-CM

## 2017-12-04 DIAGNOSIS — J34.2 DEVIATED NASAL SEPTUM: ICD-10-CM

## 2017-12-04 RX ORDER — ALBUTEROL SULFATE 90 UG/1
2 AEROSOL, METERED RESPIRATORY (INHALATION) EVERY 6 HOURS PRN
Qty: 18 G | Refills: 0 | Status: SHIPPED | OUTPATIENT
Start: 2017-12-04 | End: 2018-01-24 | Stop reason: SDUPTHER

## 2017-12-04 NOTE — TELEPHONE ENCOUNTER
LPN spoke with patient and assured him that message was sent to . Patient verbalized understanding and thanked LPN.

## 2017-12-04 NOTE — TELEPHONE ENCOUNTER
LPN spoke with patient as per Dr. Botello request. Patient stated that he was expecting an inhaler prior to surgery due to SOB. LPN will let  know and call him back. Patient verbalized understanding.

## 2017-12-05 ENCOUNTER — TELEPHONE (OUTPATIENT)
Dept: OTOLARYNGOLOGY | Facility: CLINIC | Age: 62
End: 2017-12-05

## 2017-12-05 NOTE — TELEPHONE ENCOUNTER
LPN spoke with patient and advised him as per Dr. Botello.  Patient verbalized understanding and gratitude.

## 2017-12-05 NOTE — TELEPHONE ENCOUNTER
----- Message from Skyla Us LPN sent at 12/4/2017  3:55 PM CST -----  Contact: pt at [phone]  He said that you were going to prescribe him some type of inhaler prior to surgery because he keeps getting out of breath.   ----- Message -----  From: Rm Botello MD  Sent: 12/4/2017   2:16 PM  To: Skyla Us LPN    I don't have a record that I was planning to prescribe something.  Does he have any idea kind of medicine it was?    ----- Message -----  From: Skyla Us LPN  Sent: 12/4/2017   1:52 PM  To: Rm Botello MD        ----- Message -----  From: Linda Rosas  Sent: 12/4/2017  12:35 PM  To: Tisha Abdalla Staff    Tisha pt-Pt saw Tisha last Thursday and nothing has been called in yet.  Pt uses [facility] at [phone].  Call pt with update this afternoon.

## 2017-12-15 ENCOUNTER — TELEPHONE (OUTPATIENT)
Dept: OTOLARYNGOLOGY | Facility: CLINIC | Age: 62
End: 2017-12-15

## 2017-12-18 ENCOUNTER — ANESTHESIA (OUTPATIENT)
Dept: SURGERY | Facility: HOSPITAL | Age: 62
End: 2017-12-18
Payer: MEDICAID

## 2017-12-18 ENCOUNTER — HOSPITAL ENCOUNTER (OUTPATIENT)
Facility: HOSPITAL | Age: 62
Discharge: HOME | End: 2017-12-18
Attending: OTOLARYNGOLOGY | Admitting: OTOLARYNGOLOGY
Payer: MEDICAID

## 2017-12-18 ENCOUNTER — ANESTHESIA EVENT (OUTPATIENT)
Dept: SURGERY | Facility: HOSPITAL | Age: 62
End: 2017-12-18
Payer: MEDICAID

## 2017-12-18 DIAGNOSIS — J32.9 CHRONIC SINUSITIS: ICD-10-CM

## 2017-12-18 DIAGNOSIS — J32.4 CHRONIC PANSINUSITIS: ICD-10-CM

## 2017-12-18 PROCEDURE — 71000033 HC RECOVERY, INTIAL HOUR: Performed by: OTOLARYNGOLOGY

## 2017-12-18 PROCEDURE — 63600175 PHARM REV CODE 636 W HCPCS: Performed by: OTOLARYNGOLOGY

## 2017-12-18 PROCEDURE — 25000003 PHARM REV CODE 250: Performed by: NURSE ANESTHETIST, CERTIFIED REGISTERED

## 2017-12-18 PROCEDURE — 25000003 PHARM REV CODE 250

## 2017-12-18 PROCEDURE — 61782 SCAN PROC CRANIAL EXTRA: CPT | Mod: ,,, | Performed by: OTOLARYNGOLOGY

## 2017-12-18 PROCEDURE — 88305 TISSUE EXAM BY PATHOLOGIST: CPT | Mod: 26,,, | Performed by: PATHOLOGY

## 2017-12-18 PROCEDURE — 31267 ENDOSCOPY MAXILLARY SINUS: CPT | Mod: 50,51,79, | Performed by: OTOLARYNGOLOGY

## 2017-12-18 PROCEDURE — 87076 CULTURE ANAEROBE IDENT EACH: CPT

## 2017-12-18 PROCEDURE — 31255 NSL/SINS NDSC W/TOT ETHMDCT: CPT | Mod: 50,51,79, | Performed by: OTOLARYNGOLOGY

## 2017-12-18 PROCEDURE — 37000009 HC ANESTHESIA EA ADD 15 MINS: Performed by: OTOLARYNGOLOGY

## 2017-12-18 PROCEDURE — 63600175 PHARM REV CODE 636 W HCPCS: Performed by: NURSE ANESTHETIST, CERTIFIED REGISTERED

## 2017-12-18 PROCEDURE — 31288 NASAL/SINUS ENDOSCOPY SURG: CPT | Mod: 50,51,79, | Performed by: OTOLARYNGOLOGY

## 2017-12-18 PROCEDURE — 87070 CULTURE OTHR SPECIMN AEROBIC: CPT

## 2017-12-18 PROCEDURE — 88305 TISSUE EXAM BY PATHOLOGIST: CPT | Performed by: PATHOLOGY

## 2017-12-18 PROCEDURE — 36000711: Performed by: OTOLARYNGOLOGY

## 2017-12-18 PROCEDURE — 31276 NSL/SINS NDSC FRNT TISS RMVL: CPT | Mod: 50,79,, | Performed by: OTOLARYNGOLOGY

## 2017-12-18 PROCEDURE — 37000008 HC ANESTHESIA 1ST 15 MINUTES: Performed by: OTOLARYNGOLOGY

## 2017-12-18 PROCEDURE — D9220A PRA ANESTHESIA: Mod: CRNA,,, | Performed by: NURSE ANESTHETIST, CERTIFIED REGISTERED

## 2017-12-18 PROCEDURE — 27201423 OPTIME MED/SURG SUP & DEVICES STERILE SUPPLY: Performed by: OTOLARYNGOLOGY

## 2017-12-18 PROCEDURE — 25000003 PHARM REV CODE 250: Performed by: OTOLARYNGOLOGY

## 2017-12-18 PROCEDURE — 71000039 HC RECOVERY, EACH ADD'L HOUR: Performed by: OTOLARYNGOLOGY

## 2017-12-18 PROCEDURE — 87075 CULTR BACTERIA EXCEPT BLOOD: CPT

## 2017-12-18 PROCEDURE — D9220A PRA ANESTHESIA: Mod: ANES,,, | Performed by: ANESTHESIOLOGY

## 2017-12-18 PROCEDURE — 25000242 PHARM REV CODE 250 ALT 637 W/ HCPCS: Performed by: NURSE ANESTHETIST, CERTIFIED REGISTERED

## 2017-12-18 PROCEDURE — 36000710: Performed by: OTOLARYNGOLOGY

## 2017-12-18 PROCEDURE — 87102 FUNGUS ISOLATION CULTURE: CPT

## 2017-12-18 PROCEDURE — C2625 STENT, NON-COR, TEM W/DEL SY: HCPCS | Performed by: OTOLARYNGOLOGY

## 2017-12-18 PROCEDURE — P9045 ALBUMIN (HUMAN), 5%, 250 ML: HCPCS | Performed by: NURSE ANESTHETIST, CERTIFIED REGISTERED

## 2017-12-18 PROCEDURE — 30930 THER FX NASAL INF TURBINATE: CPT | Mod: 50,51,79, | Performed by: OTOLARYNGOLOGY

## 2017-12-18 DEVICE — IMPLANT PROPEL MOMETASONE: Type: IMPLANTABLE DEVICE | Site: SINUS | Status: FUNCTIONAL

## 2017-12-18 RX ORDER — MIDAZOLAM HYDROCHLORIDE 1 MG/ML
INJECTION, SOLUTION INTRAMUSCULAR; INTRAVENOUS
Status: DISCONTINUED | OUTPATIENT
Start: 2017-12-18 | End: 2017-12-18

## 2017-12-18 RX ORDER — NEOSTIGMINE METHYLSULFATE 1 MG/ML
INJECTION, SOLUTION INTRAVENOUS
Status: DISCONTINUED | OUTPATIENT
Start: 2017-12-18 | End: 2017-12-18

## 2017-12-18 RX ORDER — EPINEPHRINE CONVENIENCE KIT 1 MG/ML(1)
KIT INTRAMUSCULAR; SUBCUTANEOUS
Status: DISCONTINUED | OUTPATIENT
Start: 2017-12-18 | End: 2017-12-18 | Stop reason: HOSPADM

## 2017-12-18 RX ORDER — ONDANSETRON 8 MG/1
8 TABLET, ORALLY DISINTEGRATING ORAL EVERY 12 HOURS PRN
Qty: 20 TABLET | Refills: 0 | Status: SHIPPED | OUTPATIENT
Start: 2017-12-18 | End: 2018-03-29

## 2017-12-18 RX ORDER — FENTANYL CITRATE 50 UG/ML
INJECTION, SOLUTION INTRAMUSCULAR; INTRAVENOUS
Status: DISCONTINUED | OUTPATIENT
Start: 2017-12-18 | End: 2017-12-18

## 2017-12-18 RX ORDER — MUPIROCIN 20 MG/G
OINTMENT TOPICAL
Status: DISCONTINUED | OUTPATIENT
Start: 2017-12-18 | End: 2017-12-18 | Stop reason: HOSPADM

## 2017-12-18 RX ORDER — ALBUMIN HUMAN 50 G/1000ML
SOLUTION INTRAVENOUS CONTINUOUS PRN
Status: DISCONTINUED | OUTPATIENT
Start: 2017-12-18 | End: 2017-12-18

## 2017-12-18 RX ORDER — HYDROCODONE BITARTRATE AND ACETAMINOPHEN 5; 325 MG/1; MG/1
TABLET ORAL
Status: COMPLETED
Start: 2017-12-18 | End: 2017-12-18

## 2017-12-18 RX ORDER — PROPOFOL 10 MG/ML
VIAL (ML) INTRAVENOUS
Status: DISCONTINUED | OUTPATIENT
Start: 2017-12-18 | End: 2017-12-18

## 2017-12-18 RX ORDER — SODIUM CHLORIDE 9 MG/ML
INJECTION, SOLUTION INTRAVENOUS CONTINUOUS PRN
Status: DISCONTINUED | OUTPATIENT
Start: 2017-12-18 | End: 2017-12-18

## 2017-12-18 RX ORDER — SODIUM CHLORIDE 0.9 % (FLUSH) 0.9 %
3 SYRINGE (ML) INJECTION
Status: CANCELLED | OUTPATIENT
Start: 2017-12-18

## 2017-12-18 RX ORDER — FENTANYL CITRATE 50 UG/ML
25 INJECTION, SOLUTION INTRAMUSCULAR; INTRAVENOUS EVERY 5 MIN PRN
Status: CANCELLED | OUTPATIENT
Start: 2017-12-18

## 2017-12-18 RX ORDER — LIDOCAINE HYDROCHLORIDE AND EPINEPHRINE 10; 10 MG/ML; UG/ML
INJECTION, SOLUTION INFILTRATION; PERINEURAL
Status: DISCONTINUED | OUTPATIENT
Start: 2017-12-18 | End: 2017-12-18 | Stop reason: HOSPADM

## 2017-12-18 RX ORDER — ONDANSETRON 2 MG/ML
INJECTION INTRAMUSCULAR; INTRAVENOUS
Status: DISCONTINUED | OUTPATIENT
Start: 2017-12-18 | End: 2017-12-18

## 2017-12-18 RX ORDER — HYDROMORPHONE HYDROCHLORIDE 2 MG/ML
0.2 INJECTION, SOLUTION INTRAMUSCULAR; INTRAVENOUS; SUBCUTANEOUS EVERY 5 MIN PRN
Status: DISCONTINUED | OUTPATIENT
Start: 2017-12-18 | End: 2017-12-19 | Stop reason: HOSPADM

## 2017-12-18 RX ORDER — HYDROCODONE BITARTRATE AND ACETAMINOPHEN 5; 325 MG/1; MG/1
1 TABLET ORAL EVERY 6 HOURS PRN
Qty: 30 TABLET | Refills: 0 | Status: SHIPPED | OUTPATIENT
Start: 2017-12-18 | End: 2017-12-28

## 2017-12-18 RX ORDER — PROPOFOL 10 MG/ML
VIAL (ML) INTRAVENOUS CONTINUOUS PRN
Status: DISCONTINUED | OUTPATIENT
Start: 2017-12-18 | End: 2017-12-18

## 2017-12-18 RX ORDER — DEXAMETHASONE SODIUM PHOSPHATE 4 MG/ML
INJECTION, SOLUTION INTRA-ARTICULAR; INTRALESIONAL; INTRAMUSCULAR; INTRAVENOUS; SOFT TISSUE
Status: DISCONTINUED | OUTPATIENT
Start: 2017-12-18 | End: 2017-12-18

## 2017-12-18 RX ORDER — LIDOCAINE HCL/PF 100 MG/5ML
SYRINGE (ML) INTRAVENOUS
Status: DISCONTINUED | OUTPATIENT
Start: 2017-12-18 | End: 2017-12-18

## 2017-12-18 RX ORDER — ROCURONIUM BROMIDE 10 MG/ML
INJECTION, SOLUTION INTRAVENOUS
Status: DISCONTINUED | OUTPATIENT
Start: 2017-12-18 | End: 2017-12-18

## 2017-12-18 RX ORDER — GLYCOPYRROLATE 0.2 MG/ML
INJECTION INTRAMUSCULAR; INTRAVENOUS
Status: DISCONTINUED | OUTPATIENT
Start: 2017-12-18 | End: 2017-12-18

## 2017-12-18 RX ORDER — SODIUM CHLORIDE 0.9 % (FLUSH) 0.9 %
3 SYRINGE (ML) INJECTION
Status: DISCONTINUED | OUTPATIENT
Start: 2017-12-18 | End: 2017-12-19 | Stop reason: HOSPADM

## 2017-12-18 RX ORDER — ACETAMINOPHEN 10 MG/ML
INJECTION, SOLUTION INTRAVENOUS
Status: DISCONTINUED | OUTPATIENT
Start: 2017-12-18 | End: 2017-12-18

## 2017-12-18 RX ORDER — LIDOCAINE HYDROCHLORIDE 10 MG/ML
1 INJECTION, SOLUTION EPIDURAL; INFILTRATION; INTRACAUDAL; PERINEURAL ONCE
Status: DISCONTINUED | OUTPATIENT
Start: 2017-12-18 | End: 2017-12-19 | Stop reason: HOSPADM

## 2017-12-18 RX ORDER — PHENYLEPHRINE HYDROCHLORIDE 10 MG/ML
INJECTION INTRAVENOUS
Status: DISCONTINUED | OUTPATIENT
Start: 2017-12-18 | End: 2017-12-18

## 2017-12-18 RX ORDER — ALBUTEROL SULFATE 90 UG/1
AEROSOL, METERED RESPIRATORY (INHALATION)
Status: DISCONTINUED | OUTPATIENT
Start: 2017-12-18 | End: 2017-12-18

## 2017-12-18 RX ADMIN — SODIUM CHLORIDE, SODIUM GLUCONATE, SODIUM ACETATE, POTASSIUM CHLORIDE, MAGNESIUM CHLORIDE, SODIUM PHOSPHATE, DIBASIC, AND POTASSIUM PHOSPHATE: .53; .5; .37; .037; .03; .012; .00082 INJECTION, SOLUTION INTRAVENOUS at 04:12

## 2017-12-18 RX ADMIN — PROPOFOL 140 MG: 10 INJECTION, EMULSION INTRAVENOUS at 04:12

## 2017-12-18 RX ADMIN — PHENYLEPHRINE HYDROCHLORIDE 300 MCG: 10 INJECTION INTRAVENOUS at 05:12

## 2017-12-18 RX ADMIN — ONDANSETRON 4 MG: 2 INJECTION INTRAMUSCULAR; INTRAVENOUS at 04:12

## 2017-12-18 RX ADMIN — ROCURONIUM BROMIDE 50 MG: 10 INJECTION, SOLUTION INTRAVENOUS at 04:12

## 2017-12-18 RX ADMIN — ALBUTEROL SULFATE 2 PUFF: 90 AEROSOL, METERED RESPIRATORY (INHALATION) at 04:12

## 2017-12-18 RX ADMIN — DEXAMETHASONE SODIUM PHOSPHATE 12 MG: 4 INJECTION, SOLUTION INTRAMUSCULAR; INTRAVENOUS at 04:12

## 2017-12-18 RX ADMIN — PHENYLEPHRINE HYDROCHLORIDE 300 MCG: 10 INJECTION INTRAVENOUS at 04:12

## 2017-12-18 RX ADMIN — NEOSTIGMINE METHYLSULFATE 4 MG: 1 INJECTION INTRAVENOUS at 08:12

## 2017-12-18 RX ADMIN — ROCURONIUM BROMIDE 10 MG: 10 INJECTION, SOLUTION INTRAVENOUS at 07:12

## 2017-12-18 RX ADMIN — SODIUM CHLORIDE: 0.9 INJECTION, SOLUTION INTRAVENOUS at 02:12

## 2017-12-18 RX ADMIN — ALBUMIN (HUMAN): 12.5 SOLUTION INTRAVENOUS at 07:12

## 2017-12-18 RX ADMIN — GLYCOPYRROLATE 0.4 MG: 0.2 INJECTION, SOLUTION INTRAMUSCULAR; INTRAVENOUS at 08:12

## 2017-12-18 RX ADMIN — GLYCOPYRROLATE 0.2 MG: 0.2 INJECTION, SOLUTION INTRAMUSCULAR; INTRAVENOUS at 03:12

## 2017-12-18 RX ADMIN — LIDOCAINE HYDROCHLORIDE 100 MG: 20 INJECTION, SOLUTION INTRAVENOUS at 04:12

## 2017-12-18 RX ADMIN — PHENYLEPHRINE HYDROCHLORIDE 0.5 MCG/KG/MIN: 10 INJECTION INTRAVENOUS at 05:12

## 2017-12-18 RX ADMIN — FENTANYL CITRATE 100 MCG: 50 INJECTION, SOLUTION INTRAMUSCULAR; INTRAVENOUS at 03:12

## 2017-12-18 RX ADMIN — Medication 2 G: at 04:12

## 2017-12-18 RX ADMIN — REMIFENTANIL HYDROCHLORIDE 0.1 MCG/KG/MIN: 1 INJECTION, POWDER, LYOPHILIZED, FOR SOLUTION INTRAVENOUS at 04:12

## 2017-12-18 RX ADMIN — PHENYLEPHRINE HYDROCHLORIDE 100 MCG: 10 INJECTION INTRAVENOUS at 05:12

## 2017-12-18 RX ADMIN — HYDROCODONE BITARTRATE AND ACETAMINOPHEN 1 TABLET: 5; 325 TABLET ORAL at 10:12

## 2017-12-18 RX ADMIN — ALBUTEROL SULFATE 6 PUFF: 90 AEROSOL, METERED RESPIRATORY (INHALATION) at 08:12

## 2017-12-18 RX ADMIN — MIDAZOLAM HYDROCHLORIDE 2 MG: 1 INJECTION, SOLUTION INTRAMUSCULAR; INTRAVENOUS at 03:12

## 2017-12-18 RX ADMIN — SODIUM CHLORIDE, SODIUM GLUCONATE, SODIUM ACETATE, POTASSIUM CHLORIDE, MAGNESIUM CHLORIDE, SODIUM PHOSPHATE, DIBASIC, AND POTASSIUM PHOSPHATE: .53; .5; .37; .037; .03; .012; .00082 INJECTION, SOLUTION INTRAVENOUS at 06:12

## 2017-12-18 RX ADMIN — PROPOFOL 60 MG: 10 INJECTION, EMULSION INTRAVENOUS at 05:12

## 2017-12-18 RX ADMIN — ROCURONIUM BROMIDE 10 MG: 10 INJECTION, SOLUTION INTRAVENOUS at 06:12

## 2017-12-18 RX ADMIN — PROPOFOL 75 MCG/KG/MIN: 10 INJECTION, EMULSION INTRAVENOUS at 04:12

## 2017-12-18 RX ADMIN — ROCURONIUM BROMIDE 10 MG: 10 INJECTION, SOLUTION INTRAVENOUS at 05:12

## 2017-12-18 RX ADMIN — ACETAMINOPHEN 1000 MG: 10 INJECTION, SOLUTION INTRAVENOUS at 04:12

## 2017-12-18 NOTE — INTERVAL H&P NOTE
The patient has been examined and the H&P has been reviewed:    I concur with the findings and no changes have occurred since H&P was written.    Anesthesia/Surgery risks, benefits and alternative options discussed and understood by patient/family.          Active Hospital Problems    Diagnosis  POA    Chronic sinusitis [J32.9]  Yes      Resolved Hospital Problems    Diagnosis Date Resolved POA   No resolved problems to display.

## 2017-12-18 NOTE — ANESTHESIA PREPROCEDURE EVALUATION
12/18/2017  Saba Hansen is a 62 y.o., male.    Anesthesia Evaluation    I have reviewed the Patient Summary Reports.    I have reviewed the Nursing Notes.      Review of Systems  Anesthesia Hx:  Denies Hx of Anesthetic complications Prior intubations document Grade 1 view, but poor neck extension, large epiglottis, short neck, easy mask   Social:  Non-Smoker    Cardiovascular:   Exercise tolerance: good Denies Hypertension.  Denies CAD.     Denies Angina.        Pulmonary:   Asthma (Just started on albuterol 2 weeks ago) Denies Shortness of breath.  Denies Sleep Apnea.    Renal/:   Denies Chronic Renal Disease.     Hepatic/GI:   Denies GERD. Denies Liver Disease.    Neurological:   Denies CVA. Denies Seizures.    Endocrine:   Denies Diabetes. Denies Hypothyroidism.        Physical Exam  General:  Well nourished    Airway/Jaw/Neck:  Airway Findings: Mallampati: II Improves to I with phonation.  TM Distance: Normal, at least 6 cm  Jaw/Neck Findings:  Neck ROM: Normal ROM       Chest/Lungs:  Chest/Lungs Findings: Clear to auscultation, Normal Respiratory Rate     Heart/Vascular:  Heart Findings: Rate: Normal        Mental Status:  Mental Status Findings:  Cooperative, Alert and Oriented         Anesthesia Plan  Type of Anesthesia, risks & benefits discussed:  Anesthesia Type:  general  Patient's Preference: GA  Intra-op Monitoring Plan: standard ASA monitors  Intra-op Monitoring Plan Comments:   Post Op Pain Control Plan: multimodal analgesia, IV/PO Opiods PRN and per primary service following discharge from PACU  Post Op Pain Control Plan Comments:   Induction:   IV  Beta Blocker:  Patient is not currently on a Beta-Blocker (No further documentation required).       Informed Consent: Patient understands risks and agrees with Anesthesia plan.  Questions answered. Anesthesia consent signed with patient.  ASA  Score: 2     Day of Surgery Review of History & Physical:    H&P update referred to the surgeon.     Anesthesia Plan Notes: The patient's PMH was reviewed and PE was performed  Plan for GA        Ready For Surgery From Anesthesia Perspective.

## 2017-12-18 NOTE — H&P (VIEW-ONLY)
Subjective:      Saba Hansen is a 62 y.o. male who was referred to me by Dr. Fern Garcia in consultation for nasal polyps.    He relates a long history for several years of chronic nasal blockage, posterior nasal drainage, throat clearing and hyposmia.  He notes associated runny nose and occasional shortness of breath and wheezing.  He has used Flonase without improvement and has also been given 2 courses of antibiotics this year, most recently amoxicillin and a medrol dose pack.  He denies reflux or ear symptoms, and has not had allergy testing.  He recalls having some sort of sinus surgery at [facility] many years ago which didn't help, and otherwise denies prior nasal trauma.    SNOT-22 score = 52, NOSE score = 85%    Global QOL = 25%    Days missed = Less than 5      Past Medical History  He has a past medical history of Asthma.    Past Surgical History  He has a past surgical history that includes Nasal sinus surgery and Aortopexy w/ MLB.    Family History  His family history is not on file.    Social History  He reports that he has never smoked. He has never used smokeless tobacco. He reports that he does not drink alcohol or use drugs.    Allergies  He has No Known Allergies.    Medications   He has a current medication list which includes the following prescription(s): fluticasone, ondansetron, and tamsulosin.    Review of Systems  Review of Systems   Constitutional: Negative for fatigue, fever and unexpected weight change.   HENT: Positive for nosebleeds, sinus pressure, sore throat and tinnitus. Negative for congestion, dental problem, ear discharge, ear pain, facial swelling, hearing loss, hoarse voice, postnasal drip, rhinorrhea, trouble swallowing and voice change.    Eyes: Negative for photophobia, discharge, itching and visual disturbance.   Respiratory: Positive for cough, shortness of breath and wheezing. Negative for apnea.    Cardiovascular: Negative for chest pain and  palpitations.   Gastrointestinal: Negative for abdominal pain, nausea and vomiting.   Endocrine: Negative for cold intolerance and heat intolerance.   Genitourinary: Negative for difficulty urinating.   Musculoskeletal: Negative for arthralgias, back pain, myalgias and neck pain.   Skin: Negative for rash.   Allergic/Immunologic: Negative for environmental allergies and food allergies.   Neurological: Negative for dizziness, seizures, syncope, weakness and headaches.   Hematological: Negative for adenopathy. Does not bruise/bleed easily.   Psychiatric/Behavioral: Negative for decreased concentration, dysphoric mood and sleep disturbance. The patient is not nervous/anxious.           Objective:     /78   Pulse 85   Wt 81.4 kg (179 lb 7.3 oz)   BMI 23.68 kg/m²        Constitutional:   He appears well-developed. He is cooperative. Normal speech.  No hoarse voice.      Head:  Normocephalic. Salivary glands normal.  Facial strength is normal.      Ears:    Right Ear: No drainage or tenderness. Tympanic membrane is not perforated. Tympanic membrane mobility is normal. No middle ear effusion. No decreased hearing is noted.   Left Ear: No drainage or tenderness. Tympanic membrane is not perforated. Tympanic membrane mobility is normal.  No middle ear effusion. No decreased hearing is noted.     Nose:  Polyps present. No mucosal edema, rhinorrhea or septal deviation. No epistaxis. Turbinates normal, no turbinate masses and no turbinate hypertrophy.  Right sinus exhibits no maxillary sinus tenderness and no frontal sinus tenderness. Left sinus exhibits no maxillary sinus tenderness and no frontal sinus tenderness.     Mouth/Throat  Oropharynx clear and moist without lesions or asymmetry and normal uvula midline. He does not have dentures. Normal dentition. No oral lesions or mucous membrane lesions. No oropharyngeal exudate or posterior oropharyngeal erythema. Mirror exam not performed due to patient tolerance.   Mirror exam not performed due to patient tolerance.      Neck:  Neck normal without thyromegaly masses, asymmetry, normal tracheal structure, crepitus, and tenderness, thyroid normal, trachea normal and no adenopathy. Normal range of motion present.     He has no cervical adenopathy.     Cardiovascular:   Regular rhythm.      Pulmonary/Chest:   Effort normal.     Psychiatric:   He has a normal mood and affect. His speech is normal and behavior is normal.     Neurological:   No cranial nerve deficit.     Skin:   No rash noted.       Procedure    Cerumen impaction removed.  See procedure note.    Nasal endoscopy performed.  See procedure note.     Left nasal valve     Left MT obscured by mucus and polyps     Left posterior nasal cavity polyps     Right nasal valve     Right MT obscured by polyps     Right ET        Data Reviewed    WBC (K/uL)   Date Value   09/11/2017 10.91     Eosinophil% (%)   Date Value   09/11/2017 26.5 (H)     Eos # (K/uL)   Date Value   09/11/2017 2.9 (H)     Platelets (K/uL)   Date Value   09/11/2017 216     Glucose (mg/dL)   Date Value   09/11/2017 71     No results found for: IGE    Lab Results   Component Value Date    WBC 10.91 09/11/2017    HGB 15.0 09/11/2017    HCT 41 09/16/2017    MCV 85 09/11/2017     09/11/2017     CMP  Sodium   Date Value Ref Range Status   09/11/2017 143 136 - 145 mmol/L Final     Potassium   Date Value Ref Range Status   09/11/2017 4.0 3.5 - 5.1 mmol/L Final     Chloride   Date Value Ref Range Status   09/11/2017 107 95 - 110 mmol/L Final     CO2   Date Value Ref Range Status   09/11/2017 27 23 - 29 mmol/L Final     Glucose   Date Value Ref Range Status   09/11/2017 71 70 - 110 mg/dL Final     BUN, Bld   Date Value Ref Range Status   09/11/2017 13 8 - 23 mg/dL Final     Creatinine   Date Value Ref Range Status   11/01/2017 0.8 0.5 - 1.4 mg/dL Final     Calcium   Date Value Ref Range Status   09/11/2017 9.4 8.7 - 10.5 mg/dL Final     Total Protein   Date  Value Ref Range Status   09/11/2017 7.5 6.0 - 8.4 g/dL Final     Albumin   Date Value Ref Range Status   09/11/2017 3.4 (L) 3.5 - 5.2 g/dL Final     Total Bilirubin   Date Value Ref Range Status   09/11/2017 0.5 0.1 - 1.0 mg/dL Final     Comment:     For infants and newborns, interpretation of results should be based  on gestational age, weight and in agreement with clinical  observations.  Premature Infant recommended reference ranges:  Up to 24 hours.............<8.0 mg/dL  Up to 48 hours............<12.0 mg/dL  3-5 days..................<15.0 mg/dL  6-29 days.................<15.0 mg/dL       Alkaline Phosphatase   Date Value Ref Range Status   09/11/2017 99 55 - 135 U/L Final     AST   Date Value Ref Range Status   09/11/2017 18 10 - 40 U/L Final     ALT   Date Value Ref Range Status   09/11/2017 19 10 - 44 U/L Final     Anion Gap   Date Value Ref Range Status   09/11/2017 9 8 - 16 mmol/L Final     eGFR if    Date Value Ref Range Status   11/01/2017 >60.0 >60 mL/min/1.73 m^2 Final     eGFR if non    Date Value Ref Range Status   11/01/2017 >60.0 >60 mL/min/1.73 m^2 Final     Comment:     Calculation used to obtain the estimated glomerular filtration  rate (eGFR) is the CKD-EPI equation.          I independently reviewed the images of the MRI orbit dated 11/7/17. Pertinent findings include diffuse complete opacification of nearly all sinuses without bony erosion.       Assessment:     1. Chronic pansinusitis    2. Nasal polyposis    3. Deviated nasal septum    4. Impacted cerumen of both ears         Plan:     I had a long discussion with the patient regarding his condition and the further workup and management options.  He would benefit from endoscopic sinus surgery and septoplasty for the treatment of his condition.  This would include all sinuses and would be bilateral.  Inferior turbinate reduction would be included.  I discussed the risks, benefits and alternatives to surgery  with the patient, as well as the expected postoperative course.  I gave him the opportunity to ask questions and I answered all of them.  I provided relevant printed information on his condition for him to review at home.  Same-day discharge is anticipated.  He may have anesthesia triage by telephone.   The surgery will be tentatively scheduled for December 18.  He will need to return for a postoperative visit 1 week after surgery.    Return for surgery.

## 2017-12-19 VITALS
DIASTOLIC BLOOD PRESSURE: 74 MMHG | HEART RATE: 75 BPM | SYSTOLIC BLOOD PRESSURE: 115 MMHG | WEIGHT: 183 LBS | RESPIRATION RATE: 16 BRPM | BODY MASS INDEX: 24.25 KG/M2 | TEMPERATURE: 98 F | HEIGHT: 73 IN | OXYGEN SATURATION: 96 %

## 2017-12-19 NOTE — ANESTHESIA POSTPROCEDURE EVALUATION
"Anesthesia Post Evaluation    Patient: Saba Hansen    Procedure(s) Performed: Procedure(s) (LRB):  SINUS SURGERY FUNCTIONAL ENDOSCOPIC WITH NAVIGATION (Bilateral)  SEPTOPLASTY (Bilateral)  REDUCTION-TURBINATE (Bilateral)    Final Anesthesia Type: general  Patient location during evaluation: PACU  Patient participation: Yes- Able to Participate  Level of consciousness: awake and alert  Post-procedure vital signs: reviewed and stable  Pain management: adequate  Airway patency: patent  PONV status at discharge: No PONV  Anesthetic complications: no      Cardiovascular status: blood pressure returned to baseline  Respiratory status: unassisted  Hydration status: euvolemic  Follow-up not needed.        Visit Vitals  /74 (BP Location: Right arm, Patient Position: Lying)   Pulse 75   Temp 36.6 °C (97.9 °F) (Temporal)   Resp 16   Ht 6' 1" (1.854 m)   Wt 83 kg (183 lb)   SpO2 96%   BMI 24.14 kg/m²       Pain/Gregory Score: Pain Assessment Performed: Yes (12/18/2017 11:00 PM)  Presence of Pain: denies (12/18/2017 11:00 PM)  Pain Rating Prior to Med Admin: 4 (12/18/2017 10:42 PM)  Pain Rating Post Med Admin: 0 (12/18/2017 11:00 PM)  Gregory Score: 10 (12/18/2017 11:00 PM)      "

## 2017-12-19 NOTE — PLAN OF CARE
Vital signs stable, pt comfortable, no post op n/v, no post op bleeding this time, discharge instruction given, dressing supply and instruction how to change mustache dressing given to pt, paper prescription given to pt, fa,sharif informed

## 2017-12-19 NOTE — TRANSFER OF CARE
"Anesthesia Transfer of Care Note    Patient: Saba Hansen    Procedure(s) Performed: Procedure(s) (LRB):  SINUS SURGERY FUNCTIONAL ENDOSCOPIC WITH NAVIGATION (Bilateral)  SEPTOPLASTY (Bilateral)  REDUCTION-TURBINATE (Bilateral)    Patient location: PACU    Anesthesia Type: general    Transport from OR: Transported from OR on 6-10 L/min O2 by face mask with adequate spontaneous ventilation    Post pain: adequate analgesia    Post assessment: no apparent anesthetic complications and tolerated procedure well    Post vital signs: stable    Level of consciousness: awake    Nausea/Vomiting: no nausea/vomiting    Complications: none    Transfer of care protocol was followed      Last vitals:   Visit Vitals  /76 (BP Location: Right arm, Patient Position: Lying)   Pulse 73   Temp 36.6 °C (97.9 °F) (Temporal)   Resp 15   Ht 6' 1" (1.854 m)   Wt 83 kg (183 lb)   SpO2 96%   BMI 24.14 kg/m²     "

## 2017-12-19 NOTE — BRIEF OP NOTE
[facility]  Brief Operative Note     SUMMARY     Surgery Date: 12/18/2017     Surgeon(s) and Role:     * Rm Giles MD - Primary     * Lauren Newsome MD - Resident - Assisting        Pre-op Diagnosis:  Deviated nasal septum [J34.2]  Chronic pansinusitis [J32.4]  Nasal polyposis [J33.9]  Impacted cerumen of both ears [H61.23]    Post-op Diagnosis:  Post-Op Diagnosis Codes:     * Deviated nasal septum [J34.2]     * Chronic pansinusitis [J32.4]     * Nasal polyposis [J33.9]     * Impacted cerumen of both ears [H61.23]    Procedure(s) (LRB):  SINUS SURGERY FUNCTIONAL ENDOSCOPIC WITH NAVIGATION (Bilateral)  SEPTOPLASTY (Bilateral)  REDUCTION-TURBINATE (Bilateral)    Anesthesia: General    Description of the findings of the procedure: bilateral nasal polyposis; septoplasty, bilateral maxillary antrostomies, total ethmoidectomies, sphenoidotomies, frontal sinus recess exploration, inferior turbinate reduction, propel stent placement    Findings/Key Components: see full op report for details    Estimated Blood Loss: 450 mL         Specimens:   Specimen (12h ago through future)    None          Discharge Note    SUMMARY     Admit Date: 12/18/2017    Discharge Date and Time: No discharge date for patient encounter.    Hospital Course (synopsis of major diagnoses, care, treatment, and services provided during the course of the hospital stay): Following completion of an electively scheduled procedure, the patient was transferred to the PACU for postoperative monitoring.His  hospital course was uneventful and noted for adequate pain control and PO intake following surgery. He   is discharged home in good condition and will follow-up with Dr. Giles           Final Diagnosis: Post-Op Diagnosis Codes:     * Deviated nasal septum [J34.2]     * Chronic pansinusitis [J32.4]     * Nasal polyposis [J33.9]     * Impacted cerumen of both ears [H61.23]    Disposition: Home or Self Care    Follow  Up/Patient Instructions:     Medications:  Reconciled Home Medications:   Current Discharge Medication List      START taking these medications    Details   hydrocodone-acetaminophen 5-325mg (NORCO) 5-325 mg per tablet Take 1 tablet by mouth every 6 (six) hours as needed for Pain.  Qty: 30 tablet, Refills: 0      !! ondansetron (ZOFRAN-ODT) 8 MG TbDL Take 1 tablet (8 mg total) by mouth every 12 (twelve) hours as needed.  Qty: 20 tablet, Refills: 0       !! - Potential duplicate medications found. Please discuss with provider.      CONTINUE these medications which have NOT CHANGED    Details   albuterol 90 mcg/actuation inhaler Inhale 2 puffs into the lungs every 6 (six) hours as needed for Wheezing. Rescue  Qty: 18 g, Refills: 0    Associated Diagnoses: Mild asthma without complication, unspecified whether persistent      tamsulosin (FLOMAX) 0.4 mg Cp24 Take 1 capsule (0.4 mg total) by mouth every evening.  Qty: 30 capsule, Refills: 12    Associated Diagnoses: Urinary retention; Encounter for Red catheter removal      !! ondansetron (ZOFRAN-ODT) 4 MG TbDL Take 2 tablets (8 mg total) by mouth every 8 (eight) hours as needed (nausea).  Qty: 20 tablet, Refills: 0       !! - Potential duplicate medications found. Please discuss with provider.          Discharge Procedure Orders  Diet general     Other restrictions (specify):   Order Comments: No heavy lifting or straining. No nose blowing     Call MD for:  severe uncontrolled pain     Call MD for:  difficulty breathing or increased cough     No dressing needed       Follow-up Information     Rm Botello MD In 1 week.    Specialty:  Otolaryngology  Contact information:  [address]  [facility] [address] [phone]

## 2017-12-20 LAB — BACTERIA SPEC AEROBE CULT: NORMAL

## 2017-12-20 NOTE — OP NOTE
DATE OF OPERATION: 12/18/2017    SURGEON:  Rm Botello MD     ASSISTANT SURGEON:  Lauren Newsome MD     OPERATION:     1. Bilateral image-guided endoscopic total ethmoidectomy.  2. Bilateral image-guided endoscopic maxillary antrostomy.  3. Bilateral image-guided endoscopic sphenoidotomy.  4. Bilateral image-guided endoscopic frontal dissection with Draf IIA sinusotomy.  5. Bilateral inferior turbinate reduction with outfracture.     PREOPERATIVE DIAGNOSIS:      1. Chronic rhinosinusitis.  2. Hypertrophic turbinates.  3. Sinonasal polyposis.     POSTOPERATIVE DIAGNOSIS:      1. Chronic rhinosinusitis.  2. Hypertrophic turbinates.  3. Sinonasal polyposis.     ANESTHESIA: General.     COMPLICATIONS: None.     ESTIMATED BLOOD LOSS: 450 mL     SPECIMEN: Bilateral ethmoid.  Right maxillary polyp.  Right sphenoid polyp.  Left ethmoid culture for bacteria and fungus.     WOUND EXPECTANCY: Clean-contaminated.    DRESSING: Propel in the bilateral middle meatus. Bilateral Telfa dressings.     FINDINGS: Leftward septal deviation with vomerian spur.  Bony inferior turbinate hypertrophy.  Diffuse grade 4 polyposis bilaterally.  Inspissated brown exudate consistent with eosinophilic mucin.     INDICATIONS: Chronic rhinosinusitis, not controlled with maximal medical therapy.     I discussed the risks, benefits and alternatives of surgical correction of the chronically obstructed sinuses and associated turbinate hypertrophy with the patient as well as the expected postoperative course. I gave him the opportunity to ask questions and I answered all of them. On the morning of surgery I again met with the patient and reviewed the indications for surgery and he consented to proceed.     DESCRIPTION OF PROCEDURE: The patient was brought to the operating room and placed supine on the operating table. The patient was placed under general anesthesia and intubated. The patient was positioned with a donut under the head and the  image-guidance headset for the [employer] Fusion system was applied.  Image-guided navigation was indicated to facilitate exenteration of all ethmoid cells and the extent of sinusotomy.  The CT scan disc was loaded into the image-guidance system and registered with the patient tracking system according to the 's instructions. The pointer was calibrated and registration was verified using predefined landmarks.  Cottonoid pledgets soaked with 4% cocaine were placed into the nasal cavity bilaterally for mucosal decongestion.  Prophylactic cefazolin was given prior to the surgery start. A time-out was performed to confirm the proper patient, site and procedure.  The CT images were again reviewed prior to surgical start.  The patient was prepped and draped in the usual fashion.  The bed was placed in 20-degree reverse Trendelenberg position.     A 0-degree endoscope was used to examine the nasal cavity.  Local injections of 1% lidocaine with 1:100,000 parts epinephrine were then performed around the middle turbinates bilaterally as well as the inferior turbinate and the sphenopalatine ganglion region bilaterally.    Inferior turbinate reduction was then performed.  A Slayton elevator was used to medialize the inferior turbinate.  Then, a Jensen/Boies elevator was used to outfracture the turbinate at its attachment with the lateral nasal wall.  There was no bleeding.  An identical procedure was performed bilaterally.     Attention was then turned to the sinuses. Large polyps were found to be protruding into the nasal cavity and were extracted and sent to the pathologist. The left middle meatus was topically decongested using pledgets containing 10,000 units of thrombin with 1:10,000 parts epinephrine.    The uncinate process was then visualized and a micro-martine backbiter was used to incise the uncinate process. The uncinate was dissected to its superior attachment and removed using cutting forceps.  A barbara  bullosa was not present.       After removal of the bone, the natural ostium of the maxillary sinus was visible. The lumen was visualized with a 30-degree scope and entered using a curved suction to confirm the dimension. The antrostomy was enlarged with cutting instruments to include the natural sinus ostium, taking care not to move anterior to the maxillary line so as to avoid injury to the nasolacrimal duct.  Additional bone was removed using forceps.  Polypoid tissue  was present within the maxillary sinus.  This was thoroughly removed and sent for pathologic evaluation.     The ethmoid bulla was entered using a microdebrider and anterior ethmoidectomy was performed.  The roof of the bulla was removed with forceps and the suprabullar recess was exposed. The grand lamella of the middle turbinate was then traversed and posterior ethmoidectomy was performed.  An Onodi cell was present.  Thick, tenacious brown mucus was insinuated within the ethmoid cells, which was swabbed for culture.  The mucosa was hypertrophic throughout the sinuses, indicative of chronic inflammation.  Topical hemostatic agents on pledgets were then placed into the ethmoid cavity for hemostasis.    The sphenoid sinus rostrum was identified and the sinus was entered in a low and medial fashion, adjacent to the superior turbinate. This sphenoidotomy was enlarged to include the posterior segment of this superior turbinate and the natural ostium of the sphenoid sinus.  Polypoid tissue was present within the sphenoid sinus.  This was thoroughly removed and sent for pathologic evaluation.    Dissection was performed at the site of the nasofrontal recess.  Using a 70-degree scope, a suprabullar cell was dissected and uncapped in a medial-to-lateral direction.  An agger nasi cell was then opened from an retrograde approach.  A supra-bullar frontal cell was present which was also opened in a retrograde fashion using angulated cutting instruments.   Afterward, the frontal sinus ostium was visible but stenotic.  Therefore, the ostium was enlarged anteriorly using cutting instruments, taking care to avoid circumerential mucosal injury.  Powered instrumentation was not required.  The floor of the frontal sinus was removed between the lamina of the orbit and the suspension of the middle turbinate to complete a Draf IIA sinusotomy.  The anterior ethmoidal artery was identified and avoided with assistance from the image-guidance system probe.  At the conclusion of dissection there was excellent visualization into the frontal sinus, which would not otherwise have been possible.     Attention was then turned to the right side of the sinonasal tract.  A similar procedure was performed on this side, including uncinectomy, maxillary antrostomy, total ethmoidectomy, sphenoidotomy and frontal sinus dissection.     At the conclusion of these procedures, the image-guidance probe was used to verify that all ethmoid cells had been properly opened and that the skull base was visible and that the lamina papyracea had not been traversed on either side.  All sinuses were copiously irrigated with warm normal saline solution.     At this point, the pledgets were all removed. Korin hemostatic agent was placed into the surgical sites.  A Propel steroid-eluting stent was placed into the bilateral ethmoid cavities to stent open the surgical site.  The nasal cavity was dressed with folded Telfa sponges.  Mupirocin ointment was applied to the vestibule bilaterally.  At this point, the headset was removed and the drapes were taken down. Intravenous dexamethasone was given toward the end of the case. The patient was turned back toward the anesthesiologist and awakened from anesthesia, extubated and transferred to the recovery room in stable condition.      POSTOPERATIVE PLAN:  Telfa removal in PACU.  Budesonide sinus rinse on first POV.

## 2017-12-22 LAB — BACTERIA SPEC ANAEROBE CULT: NORMAL

## 2017-12-28 ENCOUNTER — OFFICE VISIT (OUTPATIENT)
Dept: OTOLARYNGOLOGY | Facility: CLINIC | Age: 62
End: 2017-12-28
Payer: MEDICAID

## 2017-12-28 VITALS
DIASTOLIC BLOOD PRESSURE: 73 MMHG | HEART RATE: 98 BPM | WEIGHT: 181.19 LBS | BODY MASS INDEX: 23.91 KG/M2 | SYSTOLIC BLOOD PRESSURE: 109 MMHG

## 2017-12-28 DIAGNOSIS — J32.4 CHRONIC PANSINUSITIS: Primary | ICD-10-CM

## 2017-12-28 DIAGNOSIS — J33.9 NASAL POLYPOSIS: ICD-10-CM

## 2017-12-28 PROCEDURE — 31237 NSL/SINS NDSC SURG BX POLYPC: CPT | Mod: 50,PBBFAC | Performed by: OTOLARYNGOLOGY

## 2017-12-28 PROCEDURE — 99999 PR PBB SHADOW E&M-EST. PATIENT-LVL III: CPT | Mod: PBBFAC,,, | Performed by: OTOLARYNGOLOGY

## 2017-12-28 PROCEDURE — 99213 OFFICE O/P EST LOW 20 MIN: CPT | Mod: PBBFAC | Performed by: OTOLARYNGOLOGY

## 2017-12-28 PROCEDURE — 99024 POSTOP FOLLOW-UP VISIT: CPT | Mod: S$PBB,,, | Performed by: OTOLARYNGOLOGY

## 2017-12-28 NOTE — PROGRESS NOTES
Subjective:      Saba Hansen is a 62 y.o. male who comes for follow-up 1 week status-post endoscopic sinus surgery.  His last visit with me was on 11/30/2017.  Doing well, breathing fine, bleeding has stopped, no pain.  Using sinus rinse BID.    QOL assessment deferred.      Objective:     /73   Pulse 98   Wt 82.2 kg (181 lb 3.5 oz)   BMI 23.91 kg/m²      General:   not in distress   Nasal:  edematous mucosa   no septal hematoma   no bleeding   Oral Cavity:   clear   Oropharynx:   no bleeding   Neck:   nontender       Procedure    Endoscopic debridement performed.  See procedure note.        Data Reviewed    Pathology specimen was lost.  Cultures showed P acnes.      Assessment:     Doing well following bilateral endoscopic sinus surgery.  He also underwent septum/turbinate surgery which is unrelated to the reason for today's visit.    1. Chronic pansinusitis    2. Nasal polyposis         Plan:     Continue saline rinse BID.  Budesonide next visit.  Return in about 1 month (around 1/28/2018).

## 2017-12-28 NOTE — PROCEDURES
Nasal/sinus endoscopy  Date/Time: 12/28/2017 11:47 AM  Performed by: FREDRICK BAILEY  Authorized by: FREDRICK BAILEY     Consent Done?:  Yes (Verbal)  Anesthesia:     Local anesthetic:  Lidocaine 4% and Matthias-Synephrine 1/2%    Patient tolerance:  Patient tolerated the procedure well with no immediate complications  Nose:     Procedure Performed:  Removal of Debridement  External:      No external nasal deformity  Intranasal:      Mucosa no polyps     Mucosa ulcers not present     No mucosa lesions present     Turbinates not enlarged     No septum gross deformity  Nasopharynx:      No mucosa lesions     Adenoids not present     Posterior choanae patent     Eustachian tube patent     Debridement of both ethmoid cavities performed with Macias forceps.  Sinuses otherwise patent.  Propel stents in place.

## 2017-12-29 ENCOUNTER — TELEPHONE (OUTPATIENT)
Dept: OTOLARYNGOLOGY | Facility: CLINIC | Age: 62
End: 2017-12-29

## 2018-01-18 LAB — FUNGUS SPEC CULT: NORMAL

## 2018-01-22 ENCOUNTER — TELEPHONE (OUTPATIENT)
Dept: OTOLARYNGOLOGY | Facility: CLINIC | Age: 63
End: 2018-01-22

## 2018-01-22 DIAGNOSIS — J45.909 MILD ASTHMA WITHOUT COMPLICATION, UNSPECIFIED WHETHER PERSISTENT: Primary | ICD-10-CM

## 2018-01-22 NOTE — TELEPHONE ENCOUNTER
I spoke with patient and he reports that when gets up after lying down he cannot stop coughing, coughs up a lot of white-light yellow phlegm and cannot breath when this happens.  He has to cough approx 9-10 times sometimes 20 times to clear his throat.  He wakes up choking.  He is requesting a steroid inhaler and states that it has helped in the past. I will forward message to  and one of us will get back with him.  Patient verbalized understanding.

## 2018-01-24 ENCOUNTER — TELEPHONE (OUTPATIENT)
Dept: OTOLARYNGOLOGY | Facility: CLINIC | Age: 63
End: 2018-01-24

## 2018-01-24 DIAGNOSIS — J45.909 MILD ASTHMA WITHOUT COMPLICATION, UNSPECIFIED WHETHER PERSISTENT: ICD-10-CM

## 2018-01-24 RX ORDER — FLUTICASONE PROPIONATE 110 UG/1
1 AEROSOL, METERED RESPIRATORY (INHALATION) 2 TIMES DAILY
Qty: 12 G | Refills: 1 | Status: SHIPPED | OUTPATIENT
Start: 2018-01-24 | End: 2018-02-23

## 2018-01-24 RX ORDER — ALBUTEROL SULFATE 90 UG/1
2 AEROSOL, METERED RESPIRATORY (INHALATION) EVERY 6 HOURS PRN
Qty: 18 G | Refills: 0 | Status: SHIPPED | OUTPATIENT
Start: 2018-01-24 | End: 2018-09-27

## 2018-01-24 NOTE — TELEPHONE ENCOUNTER
----- Message from Skyla Us LPN sent at 1/23/2018  5:54 PM CST -----  Contact: Pt      ----- Message -----  From: Richard Ochoa MA  Sent: 1/23/2018  11:52 AM  To: Rosmery Abdalla Staff    Pt called and would like a refill on his med Albuterol 90 mcg inhaler. Pt would also like something for a cough asap. Pt would like a call back from the nurse asap.    Pt can be reached at [phone].    Thanks

## 2018-01-24 NOTE — TELEPHONE ENCOUNTER
I refilled albuterol.  It sound like he should be on asthma maintenance medication, which ideally should go through his PCP.  If he doesn't have a PCP, we should offer to establish care with an [facility] doc

## 2018-01-31 ENCOUNTER — OFFICE VISIT (OUTPATIENT)
Dept: OTOLARYNGOLOGY | Facility: CLINIC | Age: 63
End: 2018-01-31
Payer: MEDICAID

## 2018-01-31 VITALS — SYSTOLIC BLOOD PRESSURE: 115 MMHG | HEART RATE: 82 BPM | DIASTOLIC BLOOD PRESSURE: 78 MMHG

## 2018-01-31 DIAGNOSIS — J32.4 CHRONIC PANSINUSITIS: Primary | ICD-10-CM

## 2018-01-31 DIAGNOSIS — J33.9 NASAL POLYPOSIS: ICD-10-CM

## 2018-01-31 DIAGNOSIS — J45.909 MILD ASTHMA WITHOUT COMPLICATION, UNSPECIFIED WHETHER PERSISTENT: ICD-10-CM

## 2018-01-31 PROCEDURE — 99213 OFFICE O/P EST LOW 20 MIN: CPT | Mod: PBBFAC,25 | Performed by: OTOLARYNGOLOGY

## 2018-01-31 PROCEDURE — 3008F BODY MASS INDEX DOCD: CPT | Mod: ,,, | Performed by: OTOLARYNGOLOGY

## 2018-01-31 PROCEDURE — 31237 NSL/SINS NDSC SURG BX POLYPC: CPT | Mod: 50,PBBFAC | Performed by: OTOLARYNGOLOGY

## 2018-01-31 PROCEDURE — 99214 OFFICE O/P EST MOD 30 MIN: CPT | Mod: 25,S$PBB,, | Performed by: OTOLARYNGOLOGY

## 2018-01-31 PROCEDURE — 99999 PR PBB SHADOW E&M-EST. PATIENT-LVL III: CPT | Mod: PBBFAC,,, | Performed by: OTOLARYNGOLOGY

## 2018-01-31 RX ORDER — PREDNISONE 20 MG/1
20 TABLET ORAL DAILY
Qty: 5 TABLET | Refills: 0 | Status: SHIPPED | OUTPATIENT
Start: 2018-01-31 | End: 2018-02-05

## 2018-01-31 RX ORDER — BUDESONIDE 0.5 MG/2ML
0.5 INHALANT ORAL DAILY
Qty: 120 ML | Refills: 0 | Status: SHIPPED | OUTPATIENT
Start: 2018-01-31 | End: 2018-02-19

## 2018-01-31 NOTE — PROGRESS NOTES
Subjective:      Saba Hansen is a 62 y.o. male who comes for follow-up 5 weeks status-post endoscopic sinus surgery.  His last visit with me was on 12/28/2017.  Doing well, breathing well, some nasal mucus, no facial pressure, still anosmic.  Using saline rinse daily.  Wheezing past 2 weeks, called in albuterol, doing better now.    SNOT-22 score = 59, NOSE score = 50%      Objective:     /78   Pulse 82      General:   not in distress   Nasal:  edematous mucosa   no septal hematoma   no bleeding   Oral Cavity:   clear   Oropharynx:   no bleeding   Neck:   nontender       Procedure    Endoscopic debridement performed.  See procedure note.     Left nasal cavity     After lysis of adhesions     Left ethmoid after debridement     Right nasal valve     Right ethmoid with polypoid edema after debridment        Data Reviewed    Pathology report indicated chronic inflammation with eosinophilia.  Cultures showed P acnes.      Assessment:     Doing well following bilateral endoscopic sinus surgery.  He also underwent septum/turbinate surgery which is unrelated to the reason for today's visit.    1. Chronic pansinusitis    2. Nasal polyposis    3. Mild asthma without complication, unspecified whether persistent         Plan:     Asthma not well-controlled, will refer to allergy/asthma clinic for further management.  Rx budesonide sinus rinse daily.  Rx prednisone burst to start.  Follow-up in about 2 months (around 3/31/2018).

## 2018-01-31 NOTE — PROCEDURES
Nasal/sinus endoscopy  Date/Time: 1/31/2018 1:45 PM  Performed by: FREDRICK BAILEY  Authorized by: FREDRICK BAILEY     Consent Done?:  Yes (Verbal)  Anesthesia:     Local anesthetic:  Lidocaine 4% and Matthias-Synephrine 1/2%    Patient tolerance:  Patient tolerated the procedure well with no immediate complications  Nose:     Procedure Performed:  Removal of Debridement  External:      No external nasal deformity  Intranasal:      Mucosa no polyps     Mucosa ulcers not present     No mucosa lesions present     Turbinates not enlarged     No septum gross deformity  Nasopharynx:      No mucosa lesions     Adenoids not present     Posterior choanae patent     Eustachian tube patent     Debridement of both ethmoid cavities performed with Macias forceps.  Scar band lysed from left posterior nasal cavity with endoscopic scissors.  Sinuses otherwise patent.  Propel stents in place.

## 2018-02-16 ENCOUNTER — TELEPHONE (OUTPATIENT)
Dept: OTOLARYNGOLOGY | Facility: CLINIC | Age: 63
End: 2018-02-16

## 2018-02-16 NOTE — TELEPHONE ENCOUNTER
I spoke with patient and advised him that budesonide was denied by insurance company and Dr. Botello will be prescribing an alternate medication.  Patient instruction given as to untraditional use of medication as per Dr. Botello.

## 2018-02-19 DIAGNOSIS — J45.909 MILD ASTHMA WITHOUT COMPLICATION, UNSPECIFIED WHETHER PERSISTENT: ICD-10-CM

## 2018-02-19 DIAGNOSIS — J32.4 CHRONIC PANSINUSITIS: Primary | ICD-10-CM

## 2018-02-20 ENCOUNTER — TELEPHONE (OUTPATIENT)
Dept: OTOLARYNGOLOGY | Facility: CLINIC | Age: 63
End: 2018-02-20

## 2018-03-29 ENCOUNTER — OFFICE VISIT (OUTPATIENT)
Dept: OTOLARYNGOLOGY | Facility: CLINIC | Age: 63
End: 2018-03-29
Payer: MEDICAID

## 2018-03-29 VITALS
BODY MASS INDEX: 23.73 KG/M2 | HEART RATE: 74 BPM | DIASTOLIC BLOOD PRESSURE: 68 MMHG | SYSTOLIC BLOOD PRESSURE: 104 MMHG | WEIGHT: 179.88 LBS

## 2018-03-29 DIAGNOSIS — J45.909 MILD ASTHMA WITHOUT COMPLICATION, UNSPECIFIED WHETHER PERSISTENT: ICD-10-CM

## 2018-03-29 DIAGNOSIS — J33.9 NASAL POLYPOSIS: ICD-10-CM

## 2018-03-29 DIAGNOSIS — J32.4 CHRONIC PANSINUSITIS: Primary | ICD-10-CM

## 2018-03-29 PROCEDURE — 31231 NASAL ENDOSCOPY DX: CPT | Mod: PBBFAC | Performed by: OTOLARYNGOLOGY

## 2018-03-29 PROCEDURE — 99213 OFFICE O/P EST LOW 20 MIN: CPT | Mod: PBBFAC | Performed by: OTOLARYNGOLOGY

## 2018-03-29 PROCEDURE — 99999 PR PBB SHADOW E&M-EST. PATIENT-LVL III: CPT | Mod: PBBFAC,,, | Performed by: OTOLARYNGOLOGY

## 2018-03-29 PROCEDURE — 99214 OFFICE O/P EST MOD 30 MIN: CPT | Mod: 25,S$PBB,, | Performed by: OTOLARYNGOLOGY

## 2018-03-29 RX ORDER — PREDNISONE 10 MG/1
10 TABLET ORAL DAILY
Qty: 18 TABLET | Refills: 0 | Status: SHIPPED | OUTPATIENT
Start: 2018-03-29 | End: 2018-04-07

## 2018-03-29 NOTE — PROGRESS NOTES
Subjective:      Saba is a 63 y.o. male who comes for follow-up of sinusitis.  His last visit with me was on 1/31/2018.  Now over 3 months status-post endoscopic sinus surgery.   Doing well, breathing well, some postnasal drip and mucus, no pain.  No wheezing.  Using Qvar with baby nipple for nose, no pulmonary inhaler.    SNOT-22 score = 44, NOSE score = 50%    The patient's medications, allergies, past medical, surgical, social and family histories were reviewed and updated as appropriate.    A detailed review of systems was obtained with pertinent positives as per the above HPI, and otherwise negative.        Objective:     /68   Pulse 74   Wt 81.6 kg (179 lb 14.3 oz)   BMI 23.73 kg/m²        Constitutional:   He appears well-developed. He is cooperative.     Head:  Normocephalic.     Nose:  No mucosal edema, rhinorrhea, septal deviation or polyps. No epistaxis. Turbinates normal, no turbinate masses and no turbinate hypertrophy.  Right sinus exhibits no maxillary sinus tenderness and no frontal sinus tenderness. Left sinus exhibits no maxillary sinus tenderness and no frontal sinus tenderness.     Mouth/Throat  Oropharynx clear and moist without lesions or asymmetry. No oropharyngeal exudate or posterior oropharyngeal erythema.     Neck:  No adenopathy. Normal range of motion present.     He has no cervical adenopathy.       Procedure    Nasal endoscopy performed.  See procedure note.     Left nasal valve     Left MT with exudate     Right nasal valve     Right MT with edema and exudate        Data Reviewed    WBC (K/uL)   Date Value   09/11/2017 10.91     Eosinophil% (%)   Date Value   09/11/2017 26.5 (H)     Eos # (K/uL)   Date Value   09/11/2017 2.9 (H)     Platelets (K/uL)   Date Value   09/11/2017 216     Glucose (mg/dL)   Date Value   09/11/2017 71     No results found for: IGE    Pathology report indicated chronic inflammation with eosinophilia.  Cultures showed P acnes.      Assessment:      1. Chronic pansinusitis    2. Nasal polyposis    3. Mild asthma without complication, unspecified whether persistent         Plan:     Rx prednisone medium dose with taper.  Continue Qvar nasal mist.  Refer to allergy/asthma clinic for asthma management.   Follow-up in about 3 months (around 6/29/2018).

## 2018-03-29 NOTE — PROCEDURES
Nasal/sinus endoscopy  Date/Time: 3/29/2018 10:27 AM  Performed by: FREDRICK BAILEY  Authorized by: FREDRICK BAILEY     Consent Done?:  Yes (Verbal)  Anesthesia:     Local anesthetic:  Lidocaine 4% and Matthias-Synephrine 1/2%    Patient tolerance:  Patient tolerated the procedure well with no immediate complications  Nose:     Procedure Performed:  Nasal Endoscopy  External:      No external nasal deformity  Intranasal:      Mucosa polyps     Mucosa ulcers not present     No mucosa lesions present     Turbinates not enlarged     No septum gross deformity  Nasopharynx:      No mucosa lesions     Adenoids not present     Posterior choanae patent     Eustachian tube patent     Marked edema and mucoid exudate at bilateral middle meatus.  No bernie polyps.

## 2018-04-27 ENCOUNTER — OFFICE VISIT (OUTPATIENT)
Dept: ALLERGY | Facility: CLINIC | Age: 63
End: 2018-04-27
Payer: MEDICAID

## 2018-04-27 ENCOUNTER — LAB VISIT (OUTPATIENT)
Dept: LAB | Facility: HOSPITAL | Age: 63
End: 2018-04-27
Attending: ALLERGY & IMMUNOLOGY
Payer: MEDICAID

## 2018-04-27 VITALS — HEIGHT: 73 IN | WEIGHT: 181.69 LBS | BODY MASS INDEX: 24.08 KG/M2 | HEART RATE: 78 BPM | OXYGEN SATURATION: 96 %

## 2018-04-27 DIAGNOSIS — J31.0 CHRONIC RHINITIS: Primary | ICD-10-CM

## 2018-04-27 DIAGNOSIS — J45.40 MODERATE PERSISTENT ASTHMA WITHOUT COMPLICATION: ICD-10-CM

## 2018-04-27 DIAGNOSIS — J31.0 CHRONIC RHINITIS: ICD-10-CM

## 2018-04-27 DIAGNOSIS — R43.0 ANOSMIA: ICD-10-CM

## 2018-04-27 DIAGNOSIS — J33.9 NASAL POLYPS: ICD-10-CM

## 2018-04-27 LAB
BASOPHILS # BLD AUTO: 0.03 K/UL
BASOPHILS NFR BLD: 0.5 %
DIFFERENTIAL METHOD: ABNORMAL
EOSINOPHIL # BLD AUTO: 0.3 K/UL
EOSINOPHIL NFR BLD: 4.4 %
ERYTHROCYTE [DISTWIDTH] IN BLOOD BY AUTOMATED COUNT: 14.6 %
HCT VFR BLD AUTO: 43.7 %
HGB BLD-MCNC: 14.1 G/DL
IGE SERPL-ACNC: 102 IU/ML
IMM GRANULOCYTES # BLD AUTO: 0.01 K/UL
IMM GRANULOCYTES NFR BLD AUTO: 0.2 %
LYMPHOCYTES # BLD AUTO: 2 K/UL
LYMPHOCYTES NFR BLD: 33.1 %
MCH RBC QN AUTO: 28.3 PG
MCHC RBC AUTO-ENTMCNC: 32.3 G/DL
MCV RBC AUTO: 88 FL
MONOCYTES # BLD AUTO: 0.5 K/UL
MONOCYTES NFR BLD: 7.4 %
NEUTROPHILS # BLD AUTO: 3.3 K/UL
NEUTROPHILS NFR BLD: 54.4 %
NRBC BLD-RTO: 0 /100 WBC
PLATELET # BLD AUTO: 239 K/UL
PMV BLD AUTO: 10.4 FL
RBC # BLD AUTO: 4.99 M/UL
WBC # BLD AUTO: 6.1 K/UL

## 2018-04-27 PROCEDURE — 86003 ALLG SPEC IGE CRUDE XTRC EA: CPT

## 2018-04-27 PROCEDURE — 82785 ASSAY OF IGE: CPT

## 2018-04-27 PROCEDURE — 99204 OFFICE O/P NEW MOD 45 MIN: CPT | Mod: S$PBB,,, | Performed by: ALLERGY & IMMUNOLOGY

## 2018-04-27 PROCEDURE — 36415 COLL VENOUS BLD VENIPUNCTURE: CPT | Mod: PO

## 2018-04-27 PROCEDURE — 99213 OFFICE O/P EST LOW 20 MIN: CPT | Mod: PBBFAC,PO | Performed by: ALLERGY & IMMUNOLOGY

## 2018-04-27 PROCEDURE — 86003 ALLG SPEC IGE CRUDE XTRC EA: CPT | Mod: 59

## 2018-04-27 PROCEDURE — 99999 PR PBB SHADOW E&M-EST. PATIENT-LVL III: CPT | Mod: PBBFAC,,, | Performed by: ALLERGY & IMMUNOLOGY

## 2018-04-27 PROCEDURE — 85025 COMPLETE CBC W/AUTO DIFF WBC: CPT

## 2018-04-27 RX ORDER — FLUTICASONE PROPIONATE 110 UG/1
2 AEROSOL, METERED RESPIRATORY (INHALATION) 2 TIMES DAILY
COMMUNITY
End: 2018-06-28 | Stop reason: SDUPTHER

## 2018-04-27 NOTE — PROGRESS NOTES
Subjective:       Patient ID: Saba Hansen is a 63 y.o. male.    Chief Complaint:  Cough (cough and lack of sense of smell)      62 yo man presents for consult from Dr Sandoval for asthma. He has nasal polyps and has anosmia. He had sinus surgery a year ago which helped but still not smell. occ comes back for seconds to minute but then goes again. He uses Q jimmy to nose and helps. He has sneeze, itchy eyes na nose and slightly stuffy. But more has cough. At times gets SOB with cough. Has H/o asthma, adult onset and worse since 2005. He has albuterol and used to use daily but now on Flovent 110 1 puff BID and uses albuterol about once per week. however wakes up most nights, not good sleeper but does cough when awake. He has been in hospital for asthma, never ICU, never intubated and has oral prednisone few times per year. He has never had allergy test. He is not on any allergy meds. He had nasal surgery and tonsillectomy years ago and then sinus surgery with Dr Sandoval a year ago. No other medical issues.         Environmental History: see history section for home environment  Review of Systems   Constitutional: Negative for activity change, appetite change, chills, fatigue, fever and unexpected weight change.   HENT: Positive for congestion, rhinorrhea and sneezing. Negative for ear discharge, ear pain, facial swelling, hearing loss, mouth sores, nosebleeds, postnasal drip, sinus pressure, sore throat, tinnitus, trouble swallowing and voice change.    Eyes: Positive for discharge and itching. Negative for redness and visual disturbance.   Respiratory: Positive for cough and shortness of breath. Negative for chest tightness and wheezing.    Cardiovascular: Negative for chest pain, palpitations and leg swelling.   Gastrointestinal: Negative for abdominal distention, abdominal pain, constipation, diarrhea, nausea and vomiting.   Genitourinary: Negative for difficulty urinating.   Musculoskeletal: Negative for  arthralgias, back pain, joint swelling and myalgias.   Skin: Negative for color change, pallor and rash.   Neurological: Negative for dizziness, tremors, speech difficulty, weakness, light-headedness and headaches.   Hematological: Negative for adenopathy. Does not bruise/bleed easily.   Psychiatric/Behavioral: Negative for agitation, confusion, decreased concentration and sleep disturbance. The patient is not nervous/anxious.         Objective:    Physical Exam   Constitutional: He is oriented to person, place, and time. He appears well-developed and well-nourished. No distress.   HENT:   Head: Normocephalic and atraumatic.   Right Ear: Hearing, tympanic membrane, external ear and ear canal normal.   Left Ear: Hearing, tympanic membrane, external ear and ear canal normal.   Nose: No mucosal edema (pink turbinates), rhinorrhea, sinus tenderness or septal deviation. No epistaxis.   Mouth/Throat: Oropharynx is clear and moist and mucous membranes are normal. No uvula swelling.   Eyes: Conjunctivae are normal. Right eye exhibits no discharge. Left eye exhibits no discharge.   Neck: Normal range of motion. No thyromegaly present.   Cardiovascular: Normal rate, regular rhythm and normal heart sounds.    No murmur heard.  Pulmonary/Chest: Effort normal and breath sounds normal. No respiratory distress. He has no wheezes.   Abdominal: Soft. He exhibits no distension. There is no tenderness.   Musculoskeletal: Normal range of motion. He exhibits no edema.   Lymphadenopathy:     He has no cervical adenopathy.   Neurological: He is alert and oriented to person, place, and time. Coordination normal.   Skin: Skin is warm and dry. No rash noted. No erythema.   Psychiatric: He has a normal mood and affect. His behavior is normal. Judgment and thought content normal.   Nursing note and vitals reviewed.      Laboratory:   none performed   Assessment:       1. Chronic rhinitis    2. Nasal polyps    3. Moderate persistent asthma  without complication    4. Anosmia         Plan:       1. Advised anosmia likely related to nasal polyps so to continue Q jimmy to nose, may benefit from adding Singulair  2. Immunocaps, CBC and IgE level today  3. Needs PFT, continue Flovent 110 1 puff BID for now and adjust after PFT  4. albuterol 2 puffs every 4 hours as needed  5. Phone review      Prophylactic measure

## 2018-04-30 LAB
A ALTERNATA IGE QN: <0.35 KU/L
A FUMIGATUS IGE QN: <0.35 KU/L
ALLERGEN MAPLE/SYCAMORE IGE: 2.83 KU/L
BAHIA GRASS IGE QN: 1.4 KU/L
BALD CYPRESS IGE QN: 3.02 KU/L
BERMUDA GRASS IGE QN: 2.8 KU/L
C GLOBOSUM IGE QN: <0.35 KU/L
C HERBARUM IGE QN: <0.35 KU/L
C LUNATA IGE QN: <0.35 KU/L
CAT DANDER IGE QN: <0.35 KU/L
COTTONWOOD IGE QN: 1.95 KU/L
D FARINAE IGE QN: <0.35 KU/L
DEPRECATED A ALTERNATA IGE RAST QL: NORMAL
DEPRECATED A FUMIGATUS IGE RAST QL: NORMAL
DEPRECATED BAHIA GRASS IGE RAST QL: ABNORMAL
DEPRECATED BALD CYPRESS IGE RAST QL: ABNORMAL
DEPRECATED BERMUDA GRASS IGE RAST QL: ABNORMAL
DEPRECATED C GLOBOSUM IGE RAST QL: NORMAL
DEPRECATED C HERBARUM IGE RAST QL: NORMAL
DEPRECATED C LUNATA IGE RAST QL: NORMAL
DEPRECATED CAT DANDER IGE RAST QL: NORMAL
DEPRECATED COTTONWOOD IGE RAST QL: ABNORMAL
DEPRECATED D FARINAE IGE RAST QL: NORMAL
DEPRECATED DOG DANDER IGE RAST QL: NORMAL
DEPRECATED ENGL PLANTAIN IGE RAST QL: ABNORMAL
DEPRECATED JOHNSON GRASS IGE RAST QL: ABNORMAL
DEPRECATED MARSH ELDER IGE RAST QL: ABNORMAL
DEPRECATED ROACH IGE RAST QL: NORMAL
DEPRECATED S ROSTRATA IGE RAST QL: NORMAL
DEPRECATED SILVER BIRCH IGE RAST QL: ABNORMAL
DEPRECATED TIMOTHY IGE RAST QL: ABNORMAL
DOG DANDER IGE QN: <0.35 KU/L
ENGL PLANTAIN IGE QN: 3.39 KU/L
JOHNSON GRASS IGE QN: 0.83 KU/L
MAPLE/SYCAMORE CLASS: ABNORMAL
MARSH ELDER IGE QN: 1.62 KU/L
ROACH IGE QN: <0.35 KU/L
S ROSTRATA IGE QN: <0.35 KU/L
SILVER BIRCH IGE QN: 3.45 KU/L
TIMOTHY IGE QN: 1.19 KU/L

## 2018-05-01 LAB
ALLERGEN PENICILLIUM IGE: <0.35 KU/L
ALLERGEN WALNUT TREE IGE: 7.16 KU/L
ALLERGEN WHITE PINE TREE IGE: <0.35 KU/L
ALLERGEN WILLOW IGE: 1.63 KU/L
COMMON RAGWEED IGE QN: 17.5 KU/L
D PTERONYSS IGE QN: <0.35 KU/L
DEPRECATED COMMON RAGWEED IGE RAST QL: ABNORMAL
DEPRECATED D PTERONYSS IGE RAST QL: NORMAL
DEPRECATED MUGWORT IGE RAST QL: ABNORMAL
DEPRECATED PECAN/HICK TREE IGE RAST QL: ABNORMAL
DEPRECATED SALTWORT IGE RAST QL: ABNORMAL
DEPRECATED WHITE OAK IGE RAST QL: ABNORMAL
MUGWORT IGE QN: 2.44 KU/L
PECAN/HICK TREE IGE QN: 4.52 KU/L
PENICILLIUM CLASS: NORMAL
RAGWEED, WESTERN IGE: 5.97 KU/L
RAGWEED, WESTERN, CLASS: ABNORMAL
SALTWORT IGE QN: 2.95 KU/L
WALNUT TREE CLASS: ABNORMAL
WHITE OAK IGE QN: 1.48 KU/L
WHITE PINE CLASS: NORMAL
WILLOW CLASS: ABNORMAL

## 2018-05-03 ENCOUNTER — HOSPITAL ENCOUNTER (OUTPATIENT)
Dept: PULMONOLOGY | Facility: CLINIC | Age: 63
Discharge: HOME | End: 2018-05-03
Payer: MEDICAID

## 2018-05-03 DIAGNOSIS — J31.0 CHRONIC RHINITIS: ICD-10-CM

## 2018-05-03 DIAGNOSIS — J33.9 NASAL POLYPS: ICD-10-CM

## 2018-05-03 DIAGNOSIS — J45.40 MODERATE PERSISTENT ASTHMA WITHOUT COMPLICATION: ICD-10-CM

## 2018-05-03 LAB
POST FEV1 FVC: 0.71
POST FEV1: 3.01
POST FVC: 4.23
PRE FEV1 FVC: 64
PRE FEV1: 2.56
PRE FVC: 4.03
PREDICTED FEV1 FVC: 79
PREDICTED FEV1: 3.82
PREDICTED FVC: 4.76

## 2018-05-03 PROCEDURE — 94060 EVALUATION OF WHEEZING: CPT | Mod: 26,S$PBB,, | Performed by: INTERNAL MEDICINE

## 2018-05-03 PROCEDURE — 94060 EVALUATION OF WHEEZING: CPT | Mod: PBBFAC | Performed by: INTERNAL MEDICINE

## 2018-05-04 ENCOUNTER — TELEPHONE (OUTPATIENT)
Dept: ALLERGY | Facility: CLINIC | Age: 63
End: 2018-05-04

## 2018-05-04 RX ORDER — MONTELUKAST SODIUM 10 MG/1
10 TABLET ORAL NIGHTLY
Qty: 30 TABLET | Refills: 6 | Status: SHIPPED | OUTPATIENT
Start: 2018-05-04 | End: 2018-06-03

## 2018-05-04 NOTE — TELEPHONE ENCOUNTER
Clarisa let him know allergy tests show he is allergic to trees, weeds and grass.  His lung test showed still mild asthma even on Flovent. Need to increase Flovent to 2 puffs twice daily and would like to add Singulair to this. He needs to f/u in 2-3 months to see if need further medications

## 2018-06-28 ENCOUNTER — OFFICE VISIT (OUTPATIENT)
Dept: OTOLARYNGOLOGY | Facility: CLINIC | Age: 63
End: 2018-06-28
Payer: MEDICAID

## 2018-06-28 VITALS — DIASTOLIC BLOOD PRESSURE: 72 MMHG | HEART RATE: 83 BPM | SYSTOLIC BLOOD PRESSURE: 108 MMHG

## 2018-06-28 DIAGNOSIS — J32.4 CHRONIC PANSINUSITIS: Primary | ICD-10-CM

## 2018-06-28 DIAGNOSIS — J45.909 MILD ASTHMA WITHOUT COMPLICATION, UNSPECIFIED WHETHER PERSISTENT: ICD-10-CM

## 2018-06-28 DIAGNOSIS — J33.9 NASAL POLYPOSIS: ICD-10-CM

## 2018-06-28 PROCEDURE — 99214 OFFICE O/P EST MOD 30 MIN: CPT | Mod: 25,S$PBB,, | Performed by: OTOLARYNGOLOGY

## 2018-06-28 PROCEDURE — 31231 NASAL ENDOSCOPY DX: CPT | Mod: PBBFAC | Performed by: OTOLARYNGOLOGY

## 2018-06-28 PROCEDURE — 99213 OFFICE O/P EST LOW 20 MIN: CPT | Mod: PBBFAC,25 | Performed by: OTOLARYNGOLOGY

## 2018-06-28 PROCEDURE — 99999 PR PBB SHADOW E&M-EST. PATIENT-LVL III: CPT | Mod: PBBFAC,,, | Performed by: OTOLARYNGOLOGY

## 2018-06-28 RX ORDER — MONTELUKAST SODIUM 10 MG/1
10 TABLET ORAL DAILY
COMMUNITY
End: 2018-12-28 | Stop reason: SDUPTHER

## 2018-06-28 RX ORDER — FLUTICASONE PROPIONATE 110 UG/1
2 AEROSOL, METERED RESPIRATORY (INHALATION) 2 TIMES DAILY
Qty: 12 G | Refills: 5 | Status: SHIPPED | OUTPATIENT
Start: 2018-06-28 | End: 2019-11-21

## 2018-06-28 RX ORDER — FLUTICASONE PROPIONATE 50 MCG
1 SPRAY, SUSPENSION (ML) NASAL DAILY
COMMUNITY
End: 2020-10-05 | Stop reason: SDUPTHER

## 2018-06-28 RX ORDER — DOXYCYCLINE 100 MG/1
100 CAPSULE ORAL EVERY 12 HOURS
Qty: 42 CAPSULE | Refills: 0 | Status: SHIPPED | OUTPATIENT
Start: 2018-06-28 | End: 2018-07-19

## 2018-06-28 NOTE — PROCEDURES
Nasal/sinus endoscopy  Date/Time: 6/28/2018 11:28 AM  Performed by: FREDRICK BAILEY  Authorized by: FREDRICK BAILEY     Consent Done?:  Yes (Verbal)  Anesthesia:     Local anesthetic:  Lidocaine 4% and Matthias-Synephrine 1/2%    Patient tolerance:  Patient tolerated the procedure well with no immediate complications  Nose:     Procedure Performed:  Nasal Endoscopy  External:      No external nasal deformity  Intranasal:      Mucosa no polyps     Mucosa ulcers not present     No mucosa lesions present     Turbinates not enlarged     No septum gross deformity  Nasopharynx:      No mucosa lesions     Adenoids not present     Posterior choanae patent     Eustachian tube patent     Bilateral MT edema with purulent exudate.  No bernie polyps.

## 2018-06-28 NOTE — PROGRESS NOTES
Subjective:      Saba is a 63 y.o. male who comes for follow-up of sinusitis.  His last visit with me was on 3/29/2018.  Now 6 months status-post endoscopic sinus surgery.   Saw allergist who did spirometry and increased Flovent to 2 times daily and added Singulair.  Doing fine, some postnasal drip and congestion.  Using Qvar intranasally as prescribed.    SNOT-22 score = 49, NOSE score = 60%, ETDQ-7 score = deferred    The patient's medications, allergies, past medical, surgical, social and family histories were reviewed and updated as appropriate.    A detailed review of systems was obtained with pertinent positives as per the above HPI, and otherwise negative.        Objective:     /72   Pulse 83        Constitutional:   He appears well-developed. He is cooperative.     Head:  Normocephalic.     Nose:  No mucosal edema, rhinorrhea, septal deviation or polyps. No epistaxis. Turbinates normal, no turbinate masses and no turbinate hypertrophy.  Right sinus exhibits no maxillary sinus tenderness and no frontal sinus tenderness. Left sinus exhibits no maxillary sinus tenderness and no frontal sinus tenderness.     Mouth/Throat  Oropharynx clear and moist without lesions or asymmetry. No oropharyngeal exudate or posterior oropharyngeal erythema.     Neck:  No adenopathy. Normal range of motion present.     He has no cervical adenopathy.       Procedure    Nasal endoscopy performed.  See procedure note.     Left MT with exudate     Right MT with exudate        Data Reviewed    WBC (K/uL)   Date Value   04/27/2018 6.10     Eosinophil% (%)   Date Value   04/27/2018 4.4     Eos # (K/uL)   Date Value   04/27/2018 0.3     Platelets (K/uL)   Date Value   04/27/2018 239     Glucose (mg/dL)   Date Value   09/11/2017 71     IgE (IU/mL)   Date Value   04/27/2018 102 (H)     Immunocaps positive for trees, weeds and grass.    Spirometry showed mild asthma even on Flovent 1 puff daily.    Pathology report indicated  chronic inflammation with eosinophilia.    Cultures showed P acnes.      Assessment:     1. Chronic pansinusitis    2. Nasal polyposis    3. Mild asthma without complication, unspecified whether persistent         Plan:     Rx doxycycline 21 days.  Refilled Flovent BID.  Continue Singulair and Qvar intranasal.  Follow-up in about 3 months (around 9/28/2018).

## 2018-09-27 ENCOUNTER — OFFICE VISIT (OUTPATIENT)
Dept: OTOLARYNGOLOGY | Facility: CLINIC | Age: 63
End: 2018-09-27
Payer: MEDICAID

## 2018-09-27 VITALS
DIASTOLIC BLOOD PRESSURE: 76 MMHG | SYSTOLIC BLOOD PRESSURE: 102 MMHG | HEART RATE: 85 BPM | BODY MASS INDEX: 24.67 KG/M2 | WEIGHT: 187 LBS

## 2018-09-27 DIAGNOSIS — J33.9 NASAL POLYPOSIS: ICD-10-CM

## 2018-09-27 DIAGNOSIS — J45.909 MILD ASTHMA WITHOUT COMPLICATION, UNSPECIFIED WHETHER PERSISTENT: ICD-10-CM

## 2018-09-27 DIAGNOSIS — J31.0 CHRONIC RHINITIS: Primary | ICD-10-CM

## 2018-09-27 DIAGNOSIS — J32.4 CHRONIC PANSINUSITIS: ICD-10-CM

## 2018-09-27 PROCEDURE — 99213 OFFICE O/P EST LOW 20 MIN: CPT | Mod: PBBFAC | Performed by: OTOLARYNGOLOGY

## 2018-09-27 PROCEDURE — 99999 PR PBB SHADOW E&M-EST. PATIENT-LVL III: CPT | Mod: PBBFAC,,, | Performed by: OTOLARYNGOLOGY

## 2018-09-27 PROCEDURE — 99212 OFFICE O/P EST SF 10 MIN: CPT | Mod: 25,S$PBB,, | Performed by: OTOLARYNGOLOGY

## 2018-09-27 PROCEDURE — 31231 NASAL ENDOSCOPY DX: CPT | Mod: PBBFAC | Performed by: OTOLARYNGOLOGY

## 2018-09-27 NOTE — PROCEDURES
Nasal/sinus endoscopy  Date/Time: 9/27/2018 10:10 AM  Performed by: Rm Botello MD  Authorized by: Rm Botello MD     Consent Done?:  Yes (Verbal)  Anesthesia:     Local anesthetic:  Lidocaine 4% and Matthias-Synephrine 1/2%    Patient tolerance:  Patient tolerated the procedure well with no immediate complications  Nose:     Procedure Performed:  Nasal Endoscopy  External:      No external nasal deformity  Intranasal:      Mucosa no polyps     Mucosa ulcers not present     No mucosa lesions present     Turbinates not enlarged     No septum gross deformity  Nasopharynx:      No mucosa lesions     Adenoids not present     Posterior choanae patent     Eustachian tube patent     Moderate MT edema.  Thick nonpurulent mucus suctioned from ethmoid bilaterally.  No bernie polyps.

## 2018-09-27 NOTE — PROGRESS NOTES
Subjective:      Saba is a 63 y.o. male who comes for follow-up of sinusitis.  His last visit with me was on 6/28/2018.  Now 9 months status-post endoscopic sinus surgery.   Doing much better, breathing well, some mucus but no infections or pressure.  Using qvar in nose, singulair and flovent for lungs, no more wheezing.    SNOT-22 score = 44, NOSE score = 50%, ETDQ-7 score = 2.9    The patient's medications, allergies, past medical, surgical, social and family histories were reviewed and updated as appropriate.    A detailed review of systems was obtained with pertinent positives as per the above HPI, and otherwise negative.        Objective:     /76   Pulse 85   Wt 84.8 kg (187 lb)   BMI 24.67 kg/m²        Constitutional:   He appears well-developed. He is cooperative.     Head:  Normocephalic.     Nose:  No mucosal edema, rhinorrhea, septal deviation or polyps. No epistaxis. Turbinates normal, no turbinate masses and no turbinate hypertrophy.  Right sinus exhibits no maxillary sinus tenderness and no frontal sinus tenderness. Left sinus exhibits no maxillary sinus tenderness and no frontal sinus tenderness.     Mouth/Throat  Oropharynx clear and moist without lesions or asymmetry. No oropharyngeal exudate or posterior oropharyngeal erythema.     Neck:  No adenopathy. Normal range of motion present.     He has no cervical adenopathy.       Procedure    Nasal endoscopy performed.  See procedure note.        Data Reviewed    WBC (K/uL)   Date Value   04/27/2018 6.10     Eosinophil% (%)   Date Value   04/27/2018 4.4     Eos # (K/uL)   Date Value   04/27/2018 0.3     Platelets (K/uL)   Date Value   04/27/2018 239     Glucose (mg/dL)   Date Value   09/11/2017 71     IgE (IU/mL)   Date Value   04/27/2018 102 (H)       Pathology report indicated chronic inflammation with eosinophilia.    Cultures showed P acnes.      Assessment:     1. Chronic rhinitis    2. Chronic pansinusitis    3. Nasal polyposis    4.  Mild asthma without complication, unspecified whether persistent         Plan:     Continue Qvar nasal application, Flovent for lungs and Singulair daily.  Follow-up in about 3 months (around 12/27/2018).

## 2018-10-18 DIAGNOSIS — R33.9 URINARY RETENTION: ICD-10-CM

## 2018-10-18 DIAGNOSIS — Z46.6 ENCOUNTER FOR FOLEY CATHETER REMOVAL: ICD-10-CM

## 2018-10-18 RX ORDER — TAMSULOSIN HYDROCHLORIDE 0.4 MG/1
CAPSULE ORAL
Qty: 90 CAPSULE | Refills: 0 | Status: SHIPPED | OUTPATIENT
Start: 2018-10-18 | End: 2021-02-01 | Stop reason: SDUPTHER

## 2018-12-28 RX ORDER — MONTELUKAST SODIUM 10 MG/1
10 TABLET ORAL DAILY
Qty: 90 TABLET | Refills: 2 | Status: SHIPPED | OUTPATIENT
Start: 2018-12-28 | End: 2019-09-29 | Stop reason: SDUPTHER

## 2019-01-29 DIAGNOSIS — Z46.6 ENCOUNTER FOR FOLEY CATHETER REMOVAL: ICD-10-CM

## 2019-01-29 DIAGNOSIS — R33.9 URINARY RETENTION: ICD-10-CM

## 2019-01-29 RX ORDER — TAMSULOSIN HYDROCHLORIDE 0.4 MG/1
CAPSULE ORAL
Qty: 90 CAPSULE | Refills: 0 | OUTPATIENT
Start: 2019-01-29

## 2019-02-26 RX ORDER — FLUTICASONE PROPIONATE 50 MCG
SPRAY, SUSPENSION (ML) NASAL
Qty: 32 ML | Refills: 0 | OUTPATIENT
Start: 2019-02-26

## 2019-09-30 RX ORDER — MONTELUKAST SODIUM 10 MG/1
TABLET ORAL
Qty: 90 TABLET | Refills: 0 | Status: SHIPPED | OUTPATIENT
Start: 2019-09-30 | End: 2020-08-07

## 2019-11-21 ENCOUNTER — OFFICE VISIT (OUTPATIENT)
Dept: OTOLARYNGOLOGY | Facility: CLINIC | Age: 64
End: 2019-11-21
Payer: MEDICAID

## 2019-11-21 VITALS — DIASTOLIC BLOOD PRESSURE: 88 MMHG | SYSTOLIC BLOOD PRESSURE: 132 MMHG | HEART RATE: 80 BPM

## 2019-11-21 DIAGNOSIS — J33.9 NASAL POLYPOSIS: ICD-10-CM

## 2019-11-21 DIAGNOSIS — J31.0 CHRONIC RHINITIS: Primary | ICD-10-CM

## 2019-11-21 DIAGNOSIS — J45.909 MILD ASTHMA WITHOUT COMPLICATION, UNSPECIFIED WHETHER PERSISTENT: ICD-10-CM

## 2019-11-21 DIAGNOSIS — J32.4 CHRONIC PANSINUSITIS: ICD-10-CM

## 2019-11-21 PROCEDURE — 99214 OFFICE O/P EST MOD 30 MIN: CPT | Mod: 25,S$PBB,, | Performed by: OTOLARYNGOLOGY

## 2019-11-21 PROCEDURE — 31231 NASAL ENDOSCOPY DX: CPT | Mod: PBBFAC | Performed by: OTOLARYNGOLOGY

## 2019-11-21 PROCEDURE — 99214 PR OFFICE/OUTPT VISIT, EST, LEVL IV, 30-39 MIN: ICD-10-PCS | Mod: 25,S$PBB,, | Performed by: OTOLARYNGOLOGY

## 2019-11-21 PROCEDURE — 31231 NASAL/SINUS ENDOSCOPY: ICD-10-PCS | Mod: S$PBB,,, | Performed by: OTOLARYNGOLOGY

## 2019-11-21 PROCEDURE — 99213 OFFICE O/P EST LOW 20 MIN: CPT | Mod: PBBFAC,25 | Performed by: OTOLARYNGOLOGY

## 2019-11-21 PROCEDURE — 99999 PR PBB SHADOW E&M-EST. PATIENT-LVL III: ICD-10-PCS | Mod: PBBFAC,,, | Performed by: OTOLARYNGOLOGY

## 2019-11-21 PROCEDURE — 99999 PR PBB SHADOW E&M-EST. PATIENT-LVL III: CPT | Mod: PBBFAC,,, | Performed by: OTOLARYNGOLOGY

## 2019-11-21 RX ORDER — DOXYCYCLINE 100 MG/1
100 CAPSULE ORAL EVERY 12 HOURS
Qty: 42 CAPSULE | Refills: 0 | Status: SHIPPED | OUTPATIENT
Start: 2019-11-21 | End: 2019-12-12

## 2019-11-21 RX ORDER — FLUTICASONE PROPIONATE 50 MCG
2 SPRAY, SUSPENSION (ML) NASAL DAILY
Qty: 16 G | Refills: 2 | Status: SHIPPED | OUTPATIENT
Start: 2019-11-21 | End: 2020-02-19

## 2019-11-21 RX ORDER — PREDNISONE 10 MG/1
TABLET ORAL
Qty: 20 TABLET | Refills: 0 | Status: SHIPPED | OUTPATIENT
Start: 2019-11-21 | End: 2021-01-20

## 2019-11-21 NOTE — PROGRESS NOTES
Subjective:      Saba is a 64 y.o. male who comes for follow-up of sinusitis.  His last visit with me was on 9/27/2018.  Now nearly 2 years status-post endoscopic sinus surgery.   Fine until 4 months ago, now return of postnasal drip and cough, daily and persistent.  Not using any sprays or allergy meds.    SNOT-22 score = 51, NOSE score = 50%, ETDQ-7 score = 4.4    The patient's medications, allergies, past medical, surgical, social and family histories were reviewed and updated as appropriate.    A detailed review of systems was obtained with pertinent positives as per the above HPI, and otherwise negative.        Objective:     /88   Pulse 80        Constitutional:   He appears well-developed. He is cooperative. Normal speech.  No hoarse voice.      Head:  Normocephalic. Salivary glands normal.  Facial strength is normal.      Ears:    Right Ear: No drainage or tenderness. Tympanic membrane is not perforated. Tympanic membrane mobility is normal. No middle ear effusion. No decreased hearing is noted.   Left Ear: No drainage or tenderness. Tympanic membrane is not perforated. Tympanic membrane mobility is normal.  No middle ear effusion. No decreased hearing is noted.     Nose:  No mucosal edema, rhinorrhea, septal deviation or polyps. No epistaxis. Turbinates normal, no turbinate masses and no turbinate hypertrophy.  Right sinus exhibits no maxillary sinus tenderness and no frontal sinus tenderness. Left sinus exhibits no maxillary sinus tenderness and no frontal sinus tenderness.     Mouth/Throat  Oropharynx clear and moist without lesions or asymmetry and normal uvula midline. He does not have dentures. Normal dentition. No oral lesions or mucous membrane lesions. No oropharyngeal exudate or posterior oropharyngeal erythema. Mirror exam not performed due to patient tolerance.  Mirror exam not performed due to patient tolerance.      Neck:  Neck normal without thyromegaly masses, asymmetry, normal  tracheal structure, crepitus, and tenderness, thyroid normal, trachea normal and no adenopathy. Normal range of motion present.     He has no cervical adenopathy.     Cardiovascular:   Regular rhythm.      Pulmonary/Chest:   Effort normal.     Psychiatric:   He has a normal mood and affect. His speech is normal and behavior is normal.     Neurological:   No cranial nerve deficit.     Skin:   No rash noted.       Procedure    Nasal endoscopy performed.  See procedure note.     Left MT     Left sinuses     Right MT     Right choana        Data Reviewed    WBC (K/uL)   Date Value   04/27/2018 6.10     Eosinophil% (%)   Date Value   04/27/2018 4.4     Eos # (K/uL)   Date Value   04/27/2018 0.3     Platelets (K/uL)   Date Value   04/27/2018 239     Glucose (mg/dL)   Date Value   09/11/2017 71     IgE (IU/mL)   Date Value   04/27/2018 102 (H)       Pathology report indicated chronic inflammation with eosinophilia.    Cultures showed P acnes.      Assessment:     1. Chronic rhinitis    2. Chronic pansinusitis    3. Nasal polyposis    4. Mild asthma without complication, unspecified whether persistent         Plan:     Rx doxycycline 21-day course.  Rx prednisone low-dose with taper.  Resume Flonase and saline rinses daily.  Follow up in about 6 months (around 5/21/2020).

## 2019-11-21 NOTE — PROCEDURES
Nasal/sinus endoscopy  Date/Time: 11/21/2019 1:45 PM  Performed by: Rm Botello MD  Authorized by: Rm Botello MD     Consent Done?:  Yes (Verbal)  Anesthesia:     Local anesthetic:  Lidocaine 4% and Matthias-Synephrine 1/2%    Patient tolerance:  Patient tolerated the procedure well with no immediate complications  Nose:     Procedure Performed:  Nasal Endoscopy  External:      No external nasal deformity  Intranasal:      Mucosa no polyps     Mucosa ulcers not present     No mucosa lesions present     Turbinates not enlarged     No septum gross deformity  Nasopharynx:      No mucosa lesions     Adenoids not present     Posterior choanae patent     Eustachian tube patent     Bilateral grade 1-2 polyps.  Mucoid exudate with scant purulence.

## 2020-10-05 RX ORDER — FLUTICASONE PROPIONATE 50 MCG
1 SPRAY, SUSPENSION (ML) NASAL DAILY
Qty: 16 G | Refills: 5 | Status: SHIPPED | OUTPATIENT
Start: 2020-10-05 | End: 2021-01-20

## 2021-01-20 ENCOUNTER — OFFICE VISIT (OUTPATIENT)
Dept: PRIMARY CARE CLINIC | Facility: CLINIC | Age: 66
End: 2021-01-20
Payer: MEDICARE

## 2021-01-20 VITALS
OXYGEN SATURATION: 97 % | SYSTOLIC BLOOD PRESSURE: 142 MMHG | HEIGHT: 72 IN | WEIGHT: 177.94 LBS | BODY MASS INDEX: 24.1 KG/M2 | DIASTOLIC BLOOD PRESSURE: 92 MMHG | HEART RATE: 92 BPM | TEMPERATURE: 98 F

## 2021-01-20 DIAGNOSIS — R03.0 BLOOD PRESSURE ELEVATED WITHOUT HISTORY OF HTN: ICD-10-CM

## 2021-01-20 DIAGNOSIS — J30.89 ALLERGIC RHINITIS DUE TO OTHER ALLERGIC TRIGGER, UNSPECIFIED SEASONALITY: ICD-10-CM

## 2021-01-20 DIAGNOSIS — J31.0 CHRONIC RHINITIS: ICD-10-CM

## 2021-01-20 DIAGNOSIS — J45.40 MODERATE PERSISTENT ASTHMA WITHOUT COMPLICATION: Primary | ICD-10-CM

## 2021-01-20 PROBLEM — J30.9 ALLERGIC RHINITIS: Status: ACTIVE | Noted: 2021-01-20

## 2021-01-20 PROCEDURE — 94640 AIRWAY INHALATION TREATMENT: CPT | Mod: S$GLB,,, | Performed by: FAMILY MEDICINE

## 2021-01-20 PROCEDURE — 99999 PR PBB SHADOW E&M-EST. PATIENT-LVL III: CPT | Mod: PBBFAC,,, | Performed by: FAMILY MEDICINE

## 2021-01-20 PROCEDURE — 94640 PR INHAL RX, AIRWAY OBST/DX SPUTUM INDUCT: ICD-10-PCS | Mod: S$GLB,,, | Performed by: FAMILY MEDICINE

## 2021-01-20 PROCEDURE — 1101F PR PT FALLS ASSESS DOC 0-1 FALLS W/OUT INJ PAST YR: ICD-10-PCS | Mod: CPTII,S$GLB,, | Performed by: FAMILY MEDICINE

## 2021-01-20 PROCEDURE — 99204 PR OFFICE/OUTPT VISIT, NEW, LEVL IV, 45-59 MIN: ICD-10-PCS | Mod: 25,S$GLB,, | Performed by: FAMILY MEDICINE

## 2021-01-20 PROCEDURE — 3008F BODY MASS INDEX DOCD: CPT | Mod: CPTII,S$GLB,, | Performed by: FAMILY MEDICINE

## 2021-01-20 PROCEDURE — 3008F PR BODY MASS INDEX (BMI) DOCUMENTED: ICD-10-PCS | Mod: CPTII,S$GLB,, | Performed by: FAMILY MEDICINE

## 2021-01-20 PROCEDURE — 3288F PR FALLS RISK ASSESSMENT DOCUMENTED: ICD-10-PCS | Mod: CPTII,S$GLB,, | Performed by: FAMILY MEDICINE

## 2021-01-20 PROCEDURE — 1126F PR PAIN SEVERITY QUANTIFIED, NO PAIN PRESENT: ICD-10-PCS | Mod: S$GLB,,, | Performed by: FAMILY MEDICINE

## 2021-01-20 PROCEDURE — 1126F AMNT PAIN NOTED NONE PRSNT: CPT | Mod: S$GLB,,, | Performed by: FAMILY MEDICINE

## 2021-01-20 PROCEDURE — 99999 PR PBB SHADOW E&M-EST. PATIENT-LVL III: ICD-10-PCS | Mod: PBBFAC,,, | Performed by: FAMILY MEDICINE

## 2021-01-20 PROCEDURE — 3288F FALL RISK ASSESSMENT DOCD: CPT | Mod: CPTII,S$GLB,, | Performed by: FAMILY MEDICINE

## 2021-01-20 PROCEDURE — 1101F PT FALLS ASSESS-DOCD LE1/YR: CPT | Mod: CPTII,S$GLB,, | Performed by: FAMILY MEDICINE

## 2021-01-20 PROCEDURE — 99204 OFFICE O/P NEW MOD 45 MIN: CPT | Mod: 25,S$GLB,, | Performed by: FAMILY MEDICINE

## 2021-01-20 RX ORDER — FLUTICASONE PROPIONATE AND SALMETEROL XINAFOATE 230; 21 UG/1; UG/1
2 AEROSOL, METERED RESPIRATORY (INHALATION) 2 TIMES DAILY
Qty: 12 G | Refills: 3 | Status: SHIPPED | OUTPATIENT
Start: 2021-01-20 | End: 2021-06-23

## 2021-01-20 RX ORDER — ALBUTEROL SULFATE 90 UG/1
2 AEROSOL, METERED RESPIRATORY (INHALATION) EVERY 6 HOURS PRN
Qty: 18 G | Refills: 2 | Status: SHIPPED | OUTPATIENT
Start: 2021-01-20

## 2021-01-20 RX ORDER — AZITHROMYCIN 250 MG/1
TABLET, FILM COATED ORAL
COMMUNITY
Start: 2021-01-05 | End: 2021-01-20 | Stop reason: ALTCHOICE

## 2021-01-20 RX ORDER — ALBUTEROL SULFATE 0.83 MG/ML
2.5 SOLUTION RESPIRATORY (INHALATION) EVERY 6 HOURS PRN
Qty: 1 BOX | Refills: 0 | Status: SHIPPED | OUTPATIENT
Start: 2021-01-20 | End: 2022-01-20

## 2021-01-20 RX ORDER — FLUTICASONE PROPIONATE 50 MCG
1 SPRAY, SUSPENSION (ML) NASAL DAILY
Qty: 16 G | Refills: 5
Start: 2021-01-20 | End: 2021-09-30 | Stop reason: SDUPTHER

## 2021-01-20 RX ORDER — MONTELUKAST SODIUM 10 MG/1
10 TABLET ORAL DAILY
Qty: 90 TABLET | Refills: 3 | Status: SHIPPED | OUTPATIENT
Start: 2021-01-20 | End: 2022-01-19

## 2021-01-20 RX ORDER — IPRATROPIUM BROMIDE AND ALBUTEROL SULFATE 2.5; .5 MG/3ML; MG/3ML
3 SOLUTION RESPIRATORY (INHALATION)
Status: COMPLETED | OUTPATIENT
Start: 2021-01-20 | End: 2021-01-20

## 2021-01-20 RX ORDER — METHYLPREDNISOLONE 4 MG/1
TABLET ORAL
COMMUNITY
Start: 2021-01-05 | End: 2021-01-20

## 2021-01-20 RX ORDER — CETIRIZINE HYDROCHLORIDE 10 MG/1
10 TABLET ORAL DAILY
Qty: 90 TABLET | Refills: 3 | Status: SHIPPED | OUTPATIENT
Start: 2021-01-20 | End: 2023-10-24 | Stop reason: SDUPTHER

## 2021-01-20 RX ADMIN — IPRATROPIUM BROMIDE AND ALBUTEROL SULFATE 3 ML: 2.5; .5 SOLUTION RESPIRATORY (INHALATION) at 12:01

## 2021-02-01 ENCOUNTER — OFFICE VISIT (OUTPATIENT)
Dept: PRIMARY CARE CLINIC | Facility: CLINIC | Age: 66
End: 2021-02-01
Payer: MEDICARE

## 2021-02-01 ENCOUNTER — LAB VISIT (OUTPATIENT)
Dept: PRIMARY CARE CLINIC | Facility: CLINIC | Age: 66
End: 2021-02-01
Payer: MEDICARE

## 2021-02-01 VITALS
BODY MASS INDEX: 24.62 KG/M2 | WEIGHT: 181.75 LBS | HEART RATE: 86 BPM | DIASTOLIC BLOOD PRESSURE: 78 MMHG | HEIGHT: 72 IN | OXYGEN SATURATION: 96 % | SYSTOLIC BLOOD PRESSURE: 128 MMHG | TEMPERATURE: 98 F

## 2021-02-01 DIAGNOSIS — N40.1 BENIGN PROSTATIC HYPERPLASIA WITH URINARY RETENTION: ICD-10-CM

## 2021-02-01 DIAGNOSIS — R33.8 BENIGN PROSTATIC HYPERPLASIA WITH URINARY RETENTION: ICD-10-CM

## 2021-02-01 DIAGNOSIS — Z11.59 ENCOUNTER FOR HEPATITIS C SCREENING TEST FOR LOW RISK PATIENT: ICD-10-CM

## 2021-02-01 DIAGNOSIS — Z00.00 ANNUAL PHYSICAL EXAM: ICD-10-CM

## 2021-02-01 DIAGNOSIS — J30.89 ALLERGIC RHINITIS DUE TO OTHER ALLERGIC TRIGGER, UNSPECIFIED SEASONALITY: ICD-10-CM

## 2021-02-01 DIAGNOSIS — Z13.220 ENCOUNTER FOR LIPID SCREENING FOR CARDIOVASCULAR DISEASE: ICD-10-CM

## 2021-02-01 DIAGNOSIS — Z13.6 ENCOUNTER FOR LIPID SCREENING FOR CARDIOVASCULAR DISEASE: ICD-10-CM

## 2021-02-01 DIAGNOSIS — Z12.5 ENCOUNTER FOR SCREENING FOR MALIGNANT NEOPLASM OF PROSTATE: ICD-10-CM

## 2021-02-01 DIAGNOSIS — Z00.00 ANNUAL PHYSICAL EXAM: Primary | ICD-10-CM

## 2021-02-01 DIAGNOSIS — Z12.11 COLON CANCER SCREENING: ICD-10-CM

## 2021-02-01 DIAGNOSIS — Z23 ENCOUNTER FOR ADMINISTRATION OF VACCINE: ICD-10-CM

## 2021-02-01 DIAGNOSIS — J45.40 MODERATE PERSISTENT ASTHMA WITHOUT COMPLICATION: ICD-10-CM

## 2021-02-01 LAB
ALBUMIN SERPL BCP-MCNC: 3.6 G/DL (ref 3.5–5.2)
ALP SERPL-CCNC: 98 U/L (ref 55–135)
ALT SERPL W/O P-5'-P-CCNC: 29 U/L (ref 10–44)
ANION GAP SERPL CALC-SCNC: 7 MMOL/L (ref 8–16)
AST SERPL-CCNC: 26 U/L (ref 10–40)
BILIRUB SERPL-MCNC: 0.3 MG/DL (ref 0.1–1)
BUN SERPL-MCNC: 21 MG/DL (ref 8–23)
CALCIUM SERPL-MCNC: 9.4 MG/DL (ref 8.7–10.5)
CHLORIDE SERPL-SCNC: 107 MMOL/L (ref 95–110)
CHOLEST SERPL-MCNC: 204 MG/DL (ref 120–199)
CHOLEST/HDLC SERPL: 3 {RATIO} (ref 2–5)
CO2 SERPL-SCNC: 29 MMOL/L (ref 23–29)
COMPLEXED PSA SERPL-MCNC: 5.1 NG/ML (ref 0–4)
CREAT SERPL-MCNC: 0.8 MG/DL (ref 0.5–1.4)
EST. GFR  (AFRICAN AMERICAN): >60 ML/MIN/1.73 M^2
EST. GFR  (NON AFRICAN AMERICAN): >60 ML/MIN/1.73 M^2
GLUCOSE SERPL-MCNC: 96 MG/DL (ref 70–110)
HDLC SERPL-MCNC: 69 MG/DL (ref 40–75)
HDLC SERPL: 33.8 % (ref 20–50)
LDLC SERPL CALC-MCNC: 114.8 MG/DL (ref 63–159)
NONHDLC SERPL-MCNC: 135 MG/DL
POTASSIUM SERPL-SCNC: 4.3 MMOL/L (ref 3.5–5.1)
PROT SERPL-MCNC: 7 G/DL (ref 6–8.4)
SODIUM SERPL-SCNC: 143 MMOL/L (ref 136–145)
TRIGL SERPL-MCNC: 101 MG/DL (ref 30–150)

## 2021-02-01 PROCEDURE — 1101F PT FALLS ASSESS-DOCD LE1/YR: CPT | Mod: CPTII,S$GLB,, | Performed by: FAMILY MEDICINE

## 2021-02-01 PROCEDURE — 90714 TD VACC NO PRESV 7 YRS+ IM: CPT | Mod: S$GLB,,, | Performed by: FAMILY MEDICINE

## 2021-02-01 PROCEDURE — 90670 PCV13 VACCINE IM: CPT | Mod: S$GLB,,, | Performed by: FAMILY MEDICINE

## 2021-02-01 PROCEDURE — 99397 PER PM REEVAL EST PAT 65+ YR: CPT | Mod: 25,S$GLB,, | Performed by: FAMILY MEDICINE

## 2021-02-01 PROCEDURE — 90471 TD VACCINE GREATER THAN OR EQUAL TO 7YO PRESERVATIVE FREE IM: ICD-10-PCS | Mod: S$GLB,,, | Performed by: FAMILY MEDICINE

## 2021-02-01 PROCEDURE — 3288F PR FALLS RISK ASSESSMENT DOCUMENTED: ICD-10-PCS | Mod: CPTII,S$GLB,, | Performed by: FAMILY MEDICINE

## 2021-02-01 PROCEDURE — 99999 PR PBB SHADOW E&M-EST. PATIENT-LVL III: ICD-10-PCS | Mod: PBBFAC,,, | Performed by: FAMILY MEDICINE

## 2021-02-01 PROCEDURE — 90714 TD VACCINE GREATER THAN OR EQUAL TO 7YO PRESERVATIVE FREE IM: ICD-10-PCS | Mod: S$GLB,,, | Performed by: FAMILY MEDICINE

## 2021-02-01 PROCEDURE — 80061 LIPID PANEL: CPT

## 2021-02-01 PROCEDURE — 90670 PNEUMOCOCCAL CONJUGATE VACCINE 13-VALENT LESS THAN 5YO & GREATER THAN: ICD-10-PCS | Mod: S$GLB,,, | Performed by: FAMILY MEDICINE

## 2021-02-01 PROCEDURE — 1126F AMNT PAIN NOTED NONE PRSNT: CPT | Mod: S$GLB,,, | Performed by: FAMILY MEDICINE

## 2021-02-01 PROCEDURE — 1126F PR PAIN SEVERITY QUANTIFIED, NO PAIN PRESENT: ICD-10-PCS | Mod: S$GLB,,, | Performed by: FAMILY MEDICINE

## 2021-02-01 PROCEDURE — 90471 IMMUNIZATION ADMIN: CPT | Mod: S$GLB,,, | Performed by: FAMILY MEDICINE

## 2021-02-01 PROCEDURE — 84153 ASSAY OF PSA TOTAL: CPT

## 2021-02-01 PROCEDURE — 3008F PR BODY MASS INDEX (BMI) DOCUMENTED: ICD-10-PCS | Mod: CPTII,S$GLB,, | Performed by: FAMILY MEDICINE

## 2021-02-01 PROCEDURE — 36415 COLL VENOUS BLD VENIPUNCTURE: CPT

## 2021-02-01 PROCEDURE — 3288F FALL RISK ASSESSMENT DOCD: CPT | Mod: CPTII,S$GLB,, | Performed by: FAMILY MEDICINE

## 2021-02-01 PROCEDURE — 36415 COLL VENOUS BLD VENIPUNCTURE: CPT | Mod: S$GLB,,, | Performed by: FAMILY MEDICINE

## 2021-02-01 PROCEDURE — 99397 PR PREVENTIVE VISIT,EST,65 & OVER: ICD-10-PCS | Mod: 25,S$GLB,, | Performed by: FAMILY MEDICINE

## 2021-02-01 PROCEDURE — 99999 PR PBB SHADOW E&M-EST. PATIENT-LVL III: CPT | Mod: PBBFAC,,, | Performed by: FAMILY MEDICINE

## 2021-02-01 PROCEDURE — 86803 HEPATITIS C AB TEST: CPT

## 2021-02-01 PROCEDURE — 80053 COMPREHEN METABOLIC PANEL: CPT

## 2021-02-01 PROCEDURE — G0009 PNEUMOCOCCAL CONJUGATE VACCINE 13-VALENT LESS THAN 5YO & GREATER THAN: ICD-10-PCS | Mod: 59,S$GLB,, | Performed by: FAMILY MEDICINE

## 2021-02-01 PROCEDURE — 1101F PR PT FALLS ASSESS DOC 0-1 FALLS W/OUT INJ PAST YR: ICD-10-PCS | Mod: CPTII,S$GLB,, | Performed by: FAMILY MEDICINE

## 2021-02-01 PROCEDURE — 36415 PR COLLECTION VENOUS BLOOD,VENIPUNCTURE: ICD-10-PCS | Mod: S$GLB,,, | Performed by: FAMILY MEDICINE

## 2021-02-01 PROCEDURE — 3008F BODY MASS INDEX DOCD: CPT | Mod: CPTII,S$GLB,, | Performed by: FAMILY MEDICINE

## 2021-02-01 PROCEDURE — G0009 ADMIN PNEUMOCOCCAL VACCINE: HCPCS | Mod: 59,S$GLB,, | Performed by: FAMILY MEDICINE

## 2021-02-01 RX ORDER — TAMSULOSIN HYDROCHLORIDE 0.4 MG/1
0.4 CAPSULE ORAL DAILY
Qty: 90 CAPSULE | Refills: 2 | Status: SHIPPED | OUTPATIENT
Start: 2021-02-01 | End: 2022-09-14 | Stop reason: SDUPTHER

## 2021-02-02 LAB — HCV AB SERPL QL IA: NEGATIVE

## 2021-02-03 DIAGNOSIS — R97.20 BPH WITH ELEVATED PSA: ICD-10-CM

## 2021-02-03 DIAGNOSIS — N40.0 BPH WITH ELEVATED PSA: ICD-10-CM

## 2021-02-03 DIAGNOSIS — E78.5 HYPERLIPIDEMIA, UNSPECIFIED HYPERLIPIDEMIA TYPE: ICD-10-CM

## 2021-02-03 DIAGNOSIS — R97.20 ELEVATED PSA: Primary | ICD-10-CM

## 2021-02-03 RX ORDER — ATORVASTATIN CALCIUM 40 MG/1
40 TABLET, FILM COATED ORAL DAILY
Qty: 90 TABLET | Refills: 3 | Status: SHIPPED | OUTPATIENT
Start: 2021-02-03 | End: 2022-09-14 | Stop reason: SDUPTHER

## 2021-02-08 ENCOUNTER — TELEPHONE (OUTPATIENT)
Dept: PRIMARY CARE CLINIC | Facility: CLINIC | Age: 66
End: 2021-02-08

## 2021-02-08 ENCOUNTER — PATIENT OUTREACH (OUTPATIENT)
Dept: ADMINISTRATIVE | Facility: OTHER | Age: 66
End: 2021-02-08

## 2021-02-08 DIAGNOSIS — Z12.11 COLON CANCER SCREENING: Primary | ICD-10-CM

## 2021-02-10 ENCOUNTER — OFFICE VISIT (OUTPATIENT)
Dept: UROLOGY | Facility: CLINIC | Age: 66
End: 2021-02-10
Payer: MEDICARE

## 2021-02-10 VITALS
SYSTOLIC BLOOD PRESSURE: 124 MMHG | WEIGHT: 184.31 LBS | DIASTOLIC BLOOD PRESSURE: 78 MMHG | BODY MASS INDEX: 24.96 KG/M2 | HEIGHT: 72 IN | HEART RATE: 95 BPM

## 2021-02-10 DIAGNOSIS — N40.1 BPH WITH OBSTRUCTION/LOWER URINARY TRACT SYMPTOMS: ICD-10-CM

## 2021-02-10 DIAGNOSIS — N13.8 BPH WITH OBSTRUCTION/LOWER URINARY TRACT SYMPTOMS: ICD-10-CM

## 2021-02-10 DIAGNOSIS — R35.1 NOCTURIA: ICD-10-CM

## 2021-02-10 DIAGNOSIS — R39.15 URGENCY OF URINATION: ICD-10-CM

## 2021-02-10 DIAGNOSIS — R97.20 ELEVATED PSA: Primary | ICD-10-CM

## 2021-02-10 PROCEDURE — 3288F FALL RISK ASSESSMENT DOCD: CPT | Mod: CPTII,S$GLB,, | Performed by: PHYSICIAN ASSISTANT

## 2021-02-10 PROCEDURE — 3008F PR BODY MASS INDEX (BMI) DOCUMENTED: ICD-10-PCS | Mod: CPTII,S$GLB,, | Performed by: PHYSICIAN ASSISTANT

## 2021-02-10 PROCEDURE — 3008F BODY MASS INDEX DOCD: CPT | Mod: CPTII,S$GLB,, | Performed by: PHYSICIAN ASSISTANT

## 2021-02-10 PROCEDURE — 3288F PR FALLS RISK ASSESSMENT DOCUMENTED: ICD-10-PCS | Mod: CPTII,S$GLB,, | Performed by: PHYSICIAN ASSISTANT

## 2021-02-10 PROCEDURE — 1126F AMNT PAIN NOTED NONE PRSNT: CPT | Mod: S$GLB,,, | Performed by: PHYSICIAN ASSISTANT

## 2021-02-10 PROCEDURE — 1159F MED LIST DOCD IN RCRD: CPT | Mod: S$GLB,,, | Performed by: PHYSICIAN ASSISTANT

## 2021-02-10 PROCEDURE — 99214 PR OFFICE/OUTPT VISIT, EST, LEVL IV, 30-39 MIN: ICD-10-PCS | Mod: S$GLB,,, | Performed by: PHYSICIAN ASSISTANT

## 2021-02-10 PROCEDURE — 51798 US URINE CAPACITY MEASURE: CPT | Mod: S$GLB,,, | Performed by: PHYSICIAN ASSISTANT

## 2021-02-10 PROCEDURE — 1159F PR MEDICATION LIST DOCUMENTED IN MEDICAL RECORD: ICD-10-PCS | Mod: S$GLB,,, | Performed by: PHYSICIAN ASSISTANT

## 2021-02-10 PROCEDURE — 1101F PR PT FALLS ASSESS DOC 0-1 FALLS W/OUT INJ PAST YR: ICD-10-PCS | Mod: CPTII,S$GLB,, | Performed by: PHYSICIAN ASSISTANT

## 2021-02-10 PROCEDURE — 99999 PR PBB SHADOW E&M-EST. PATIENT-LVL IV: ICD-10-PCS | Mod: PBBFAC,,, | Performed by: PHYSICIAN ASSISTANT

## 2021-02-10 PROCEDURE — 99999 PR PBB SHADOW E&M-EST. PATIENT-LVL IV: CPT | Mod: PBBFAC,,, | Performed by: PHYSICIAN ASSISTANT

## 2021-02-10 PROCEDURE — 51798 PR MEAS,POST-VOID RES,US,NON-IMAGING: ICD-10-PCS | Mod: S$GLB,,, | Performed by: PHYSICIAN ASSISTANT

## 2021-02-10 PROCEDURE — 99214 OFFICE O/P EST MOD 30 MIN: CPT | Mod: S$GLB,,, | Performed by: PHYSICIAN ASSISTANT

## 2021-02-10 PROCEDURE — 1126F PR PAIN SEVERITY QUANTIFIED, NO PAIN PRESENT: ICD-10-PCS | Mod: S$GLB,,, | Performed by: PHYSICIAN ASSISTANT

## 2021-02-10 PROCEDURE — 1101F PT FALLS ASSESS-DOCD LE1/YR: CPT | Mod: CPTII,S$GLB,, | Performed by: PHYSICIAN ASSISTANT

## 2021-03-08 ENCOUNTER — PATIENT MESSAGE (OUTPATIENT)
Dept: ADMINISTRATIVE | Facility: HOSPITAL | Age: 66
End: 2021-03-08

## 2021-03-11 DIAGNOSIS — Z12.11 SCREENING FOR MALIGNANT NEOPLASM OF COLON: Primary | ICD-10-CM

## 2021-03-15 ENCOUNTER — LAB VISIT (OUTPATIENT)
Dept: LAB | Facility: HOSPITAL | Age: 66
End: 2021-03-15
Payer: MEDICARE

## 2021-03-15 DIAGNOSIS — R97.20 ELEVATED PSA: ICD-10-CM

## 2021-03-15 LAB — COMPLEXED PSA SERPL-MCNC: 9.1 NG/ML (ref 0–4)

## 2021-03-15 PROCEDURE — 84153 ASSAY OF PSA TOTAL: CPT | Performed by: PHYSICIAN ASSISTANT

## 2021-03-15 PROCEDURE — 36415 COLL VENOUS BLD VENIPUNCTURE: CPT | Performed by: PHYSICIAN ASSISTANT

## 2021-03-16 DIAGNOSIS — R97.20 ELEVATED PSA: Primary | ICD-10-CM

## 2021-03-16 RX ORDER — LIDOCAINE HYDROCHLORIDE 20 MG/ML
JELLY TOPICAL ONCE
Status: CANCELLED | OUTPATIENT
Start: 2021-03-16 | End: 2021-03-16

## 2021-03-16 RX ORDER — CEFTRIAXONE 1 G/1
1 INJECTION, POWDER, FOR SOLUTION INTRAMUSCULAR; INTRAVENOUS
Status: COMPLETED | OUTPATIENT
Start: 2021-03-16 | End: 2021-04-14

## 2021-03-16 RX ORDER — LIDOCAINE HYDROCHLORIDE 10 MG/ML
10 INJECTION INFILTRATION; PERINEURAL ONCE
Status: CANCELLED | OUTPATIENT
Start: 2021-03-16 | End: 2021-03-16

## 2021-03-19 ENCOUNTER — TELEPHONE (OUTPATIENT)
Dept: UROLOGY | Facility: CLINIC | Age: 66
End: 2021-03-19

## 2021-03-24 ENCOUNTER — TELEPHONE (OUTPATIENT)
Dept: UROLOGY | Facility: CLINIC | Age: 66
End: 2021-03-24

## 2021-03-24 ENCOUNTER — PROCEDURE VISIT (OUTPATIENT)
Dept: UROLOGY | Facility: CLINIC | Age: 66
End: 2021-03-24
Payer: MEDICARE

## 2021-03-24 DIAGNOSIS — R97.20 ELEVATED PSA: ICD-10-CM

## 2021-03-30 DIAGNOSIS — R97.20 ELEVATED PSA: Primary | ICD-10-CM

## 2021-03-30 DIAGNOSIS — Z12.11 SCREENING FOR MALIGNANT NEOPLASM OF COLON: Primary | ICD-10-CM

## 2021-03-30 RX ORDER — LIDOCAINE HYDROCHLORIDE 10 MG/ML
10 INJECTION INFILTRATION; PERINEURAL ONCE
Status: CANCELLED | OUTPATIENT
Start: 2021-03-30 | End: 2021-03-30

## 2021-03-30 RX ORDER — LIDOCAINE HYDROCHLORIDE 20 MG/ML
JELLY TOPICAL ONCE
Status: CANCELLED | OUTPATIENT
Start: 2021-03-30 | End: 2021-03-30

## 2021-03-30 RX ORDER — NEBULIZER AND COMPRESSOR
EACH MISCELLANEOUS
Status: ON HOLD | COMMUNITY
Start: 2021-01-20 | End: 2024-02-06 | Stop reason: ALTCHOICE

## 2021-04-14 ENCOUNTER — PROCEDURE VISIT (OUTPATIENT)
Dept: UROLOGY | Facility: CLINIC | Age: 66
End: 2021-04-14
Payer: MEDICARE

## 2021-04-14 VITALS
HEART RATE: 77 BPM | SYSTOLIC BLOOD PRESSURE: 122 MMHG | BODY MASS INDEX: 23.29 KG/M2 | HEIGHT: 75 IN | DIASTOLIC BLOOD PRESSURE: 79 MMHG | WEIGHT: 187.31 LBS | RESPIRATION RATE: 16 BRPM | TEMPERATURE: 98 F

## 2021-04-14 DIAGNOSIS — R97.20 ELEVATED PSA: ICD-10-CM

## 2021-04-14 PROCEDURE — 88342 IMHCHEM/IMCYTCHM 1ST ANTB: CPT | Mod: 26,,, | Performed by: STUDENT IN AN ORGANIZED HEALTH CARE EDUCATION/TRAINING PROGRAM

## 2021-04-14 PROCEDURE — 76872 TRANSRECTAL ULTRASOUND W/ BIOPSY: ICD-10-PCS | Mod: 26,S$GLB,, | Performed by: UROLOGY

## 2021-04-14 PROCEDURE — 76872 US TRANSRECTAL: CPT | Mod: 26,S$GLB,, | Performed by: UROLOGY

## 2021-04-14 PROCEDURE — 88342 CHG IMMUNOCYTOCHEMISTRY: ICD-10-PCS | Mod: 26,,, | Performed by: STUDENT IN AN ORGANIZED HEALTH CARE EDUCATION/TRAINING PROGRAM

## 2021-04-14 PROCEDURE — 55700 TRANSRECTAL ULTRASOUND W/ BIOPSY: CPT | Mod: S$GLB,,, | Performed by: UROLOGY

## 2021-04-14 PROCEDURE — 88341 IMHCHEM/IMCYTCHM EA ADD ANTB: CPT | Mod: 26,,, | Performed by: STUDENT IN AN ORGANIZED HEALTH CARE EDUCATION/TRAINING PROGRAM

## 2021-04-14 PROCEDURE — 96372 PR INJECTION,THERAP/PROPH/DIAG2ST, IM OR SUBCUT: ICD-10-PCS | Mod: 59,S$GLB,, | Performed by: PHYSICIAN ASSISTANT

## 2021-04-14 PROCEDURE — 55700 TRANSRECTAL ULTRASOUND W/ BIOPSY: ICD-10-PCS | Mod: S$GLB,,, | Performed by: UROLOGY

## 2021-04-14 PROCEDURE — 88342 IMHCHEM/IMCYTCHM 1ST ANTB: CPT | Mod: 59 | Performed by: STUDENT IN AN ORGANIZED HEALTH CARE EDUCATION/TRAINING PROGRAM

## 2021-04-14 PROCEDURE — 88341 IMHCHEM/IMCYTCHM EA ADD ANTB: CPT | Performed by: STUDENT IN AN ORGANIZED HEALTH CARE EDUCATION/TRAINING PROGRAM

## 2021-04-14 PROCEDURE — 88305 TISSUE EXAM BY PATHOLOGIST: CPT | Performed by: STUDENT IN AN ORGANIZED HEALTH CARE EDUCATION/TRAINING PROGRAM

## 2021-04-14 PROCEDURE — 96372 THER/PROPH/DIAG INJ SC/IM: CPT | Mod: 59,S$GLB,, | Performed by: PHYSICIAN ASSISTANT

## 2021-04-14 PROCEDURE — 88341 PR IHC OR ICC EACH ADD'L SINGLE ANTIBODY  STAINPR: ICD-10-PCS | Mod: 26,,, | Performed by: STUDENT IN AN ORGANIZED HEALTH CARE EDUCATION/TRAINING PROGRAM

## 2021-04-14 PROCEDURE — 88305 TISSUE EXAM BY PATHOLOGIST: ICD-10-PCS | Mod: 26,,, | Performed by: STUDENT IN AN ORGANIZED HEALTH CARE EDUCATION/TRAINING PROGRAM

## 2021-04-14 PROCEDURE — 88305 TISSUE EXAM BY PATHOLOGIST: CPT | Mod: 26,,, | Performed by: STUDENT IN AN ORGANIZED HEALTH CARE EDUCATION/TRAINING PROGRAM

## 2021-04-14 RX ORDER — LIDOCAINE HYDROCHLORIDE 20 MG/ML
JELLY TOPICAL
Status: COMPLETED | OUTPATIENT
Start: 2021-04-14 | End: 2021-04-14

## 2021-04-14 RX ORDER — LIDOCAINE HYDROCHLORIDE 20 MG/ML
JELLY TOPICAL ONCE
Status: DISCONTINUED | OUTPATIENT
Start: 2021-04-14 | End: 2023-10-24

## 2021-04-14 RX ORDER — LIDOCAINE HYDROCHLORIDE 10 MG/ML
10 INJECTION INFILTRATION; PERINEURAL ONCE
Status: COMPLETED | OUTPATIENT
Start: 2021-04-14 | End: 2021-04-14

## 2021-04-14 RX ADMIN — LIDOCAINE HYDROCHLORIDE 10 ML: 10 INJECTION INFILTRATION; PERINEURAL at 12:04

## 2021-04-14 RX ADMIN — CEFTRIAXONE 1 G: 1 INJECTION, POWDER, FOR SOLUTION INTRAMUSCULAR; INTRAVENOUS at 11:04

## 2021-04-14 RX ADMIN — LIDOCAINE HYDROCHLORIDE: 20 JELLY TOPICAL at 11:04

## 2021-04-20 LAB
FINAL PATHOLOGIC DIAGNOSIS: NORMAL
GROSS: NORMAL
Lab: NORMAL

## 2021-04-21 ENCOUNTER — TELEPHONE (OUTPATIENT)
Dept: UROLOGY | Facility: CLINIC | Age: 66
End: 2021-04-21

## 2021-04-21 DIAGNOSIS — R97.20 ELEVATED PSA: Primary | ICD-10-CM

## 2021-04-24 LAB — NONINV COLON CA DNA+OCC BLD SCRN STL QL: NEGATIVE

## 2021-09-28 ENCOUNTER — TELEPHONE (OUTPATIENT)
Dept: PRIMARY CARE CLINIC | Facility: CLINIC | Age: 66
End: 2021-09-28

## 2021-09-28 DIAGNOSIS — J30.89 ALLERGIC RHINITIS DUE TO OTHER ALLERGIC TRIGGER, UNSPECIFIED SEASONALITY: ICD-10-CM

## 2021-09-30 ENCOUNTER — TELEPHONE (OUTPATIENT)
Dept: UROLOGY | Facility: CLINIC | Age: 66
End: 2021-09-30

## 2021-09-30 ENCOUNTER — TELEPHONE (OUTPATIENT)
Dept: PRIMARY CARE CLINIC | Facility: CLINIC | Age: 66
End: 2021-09-30

## 2021-09-30 RX ORDER — FLUTICASONE PROPIONATE 50 MCG
1 SPRAY, SUSPENSION (ML) NASAL DAILY
Qty: 16 G | Refills: 5 | Status: SHIPPED | OUTPATIENT
Start: 2021-09-30 | End: 2022-12-01 | Stop reason: SDUPTHER

## 2021-10-18 ENCOUNTER — LAB VISIT (OUTPATIENT)
Dept: PRIMARY CARE CLINIC | Facility: CLINIC | Age: 66
End: 2021-10-18
Payer: MEDICARE

## 2021-10-18 DIAGNOSIS — R97.20 ELEVATED PSA: ICD-10-CM

## 2021-10-18 LAB — COMPLEXED PSA SERPL-MCNC: 6.5 NG/ML (ref 0–4)

## 2021-10-18 PROCEDURE — 36415 COLL VENOUS BLD VENIPUNCTURE: CPT | Mod: PBBFAC,PN

## 2021-10-18 PROCEDURE — 84153 ASSAY OF PSA TOTAL: CPT | Performed by: PHYSICIAN ASSISTANT

## 2021-10-20 ENCOUNTER — OFFICE VISIT (OUTPATIENT)
Dept: UROLOGY | Facility: CLINIC | Age: 66
End: 2021-10-20
Payer: MEDICAID

## 2021-10-20 VITALS
SYSTOLIC BLOOD PRESSURE: 129 MMHG | WEIGHT: 194 LBS | HEIGHT: 73 IN | BODY MASS INDEX: 25.71 KG/M2 | HEART RATE: 80 BPM | DIASTOLIC BLOOD PRESSURE: 80 MMHG

## 2021-10-20 DIAGNOSIS — R97.20 ELEVATED PSA: Primary | ICD-10-CM

## 2021-10-20 DIAGNOSIS — N13.8 BPH WITH OBSTRUCTION/LOWER URINARY TRACT SYMPTOMS: ICD-10-CM

## 2021-10-20 DIAGNOSIS — R68.82 LOW LIBIDO: ICD-10-CM

## 2021-10-20 DIAGNOSIS — N40.1 BPH WITH OBSTRUCTION/LOWER URINARY TRACT SYMPTOMS: ICD-10-CM

## 2021-10-20 PROCEDURE — 99999 PR PBB SHADOW E&M-EST. PATIENT-LVL III: CPT | Mod: PBBFAC,,, | Performed by: NURSE PRACTITIONER

## 2021-10-20 PROCEDURE — 99999 PR PBB SHADOW E&M-EST. PATIENT-LVL III: ICD-10-PCS | Mod: PBBFAC,,, | Performed by: NURSE PRACTITIONER

## 2021-10-20 PROCEDURE — 99214 PR OFFICE/OUTPT VISIT, EST, LEVL IV, 30-39 MIN: ICD-10-PCS | Mod: S$GLB,,, | Performed by: NURSE PRACTITIONER

## 2021-10-20 PROCEDURE — 99214 OFFICE O/P EST MOD 30 MIN: CPT | Mod: S$GLB,,, | Performed by: NURSE PRACTITIONER

## 2021-10-20 PROCEDURE — 99213 OFFICE O/P EST LOW 20 MIN: CPT | Mod: PBBFAC | Performed by: NURSE PRACTITIONER

## 2021-10-20 RX ORDER — FINASTERIDE 5 MG/1
5 TABLET, FILM COATED ORAL DAILY
Qty: 30 TABLET | Refills: 11 | Status: SHIPPED | OUTPATIENT
Start: 2021-10-20 | End: 2023-02-09

## 2021-10-21 ENCOUNTER — LAB VISIT (OUTPATIENT)
Dept: LAB | Facility: HOSPITAL | Age: 66
End: 2021-10-21
Payer: MEDICARE

## 2021-10-21 DIAGNOSIS — R68.82 LOW LIBIDO: ICD-10-CM

## 2021-10-21 LAB — TESTOST SERPL-MCNC: 527 NG/DL (ref 304–1227)

## 2021-10-21 PROCEDURE — 84403 ASSAY OF TOTAL TESTOSTERONE: CPT | Performed by: NURSE PRACTITIONER

## 2021-10-21 PROCEDURE — 36415 COLL VENOUS BLD VENIPUNCTURE: CPT | Performed by: NURSE PRACTITIONER

## 2022-06-07 ENCOUNTER — PATIENT MESSAGE (OUTPATIENT)
Dept: PRIMARY CARE CLINIC | Facility: CLINIC | Age: 67
End: 2022-06-07
Payer: MEDICARE

## 2022-09-08 ENCOUNTER — TELEPHONE (OUTPATIENT)
Dept: PRIMARY CARE CLINIC | Facility: CLINIC | Age: 67
End: 2022-09-08
Payer: MEDICARE

## 2022-09-08 NOTE — TELEPHONE ENCOUNTER
----- Message from Shannen Chen sent at 9/8/2022  9:02 AM CDT -----  Contact: [phone]  type: Lab    Caller is requesting to schedule their Lab appointment prior to annual appointment.  Order is not listed in EPIC.  Please enter order and contact patient to schedule.    Name of Caller:patient    Preferred Date and Time of Labs: asap    Date of Annual Physical Appointment:Monday    Where would they like the lab performed?[facility]    Would the patient rather a call back or a response via [facility]? phone    Best Call Back Number:[phone]

## 2022-09-08 NOTE — TELEPHONE ENCOUNTER
[place] for this pt explaining that he will need to be seen for his annual exam before we order lab work. Pt instructed to call our office back if he has any questions.

## 2022-09-14 ENCOUNTER — OFFICE VISIT (OUTPATIENT)
Dept: PRIMARY CARE CLINIC | Facility: CLINIC | Age: 67
End: 2022-09-14
Payer: MEDICARE

## 2022-09-14 ENCOUNTER — HOSPITAL ENCOUNTER (OUTPATIENT)
Dept: RADIOLOGY | Facility: HOSPITAL | Age: 67
Discharge: HOME | End: 2022-09-14
Attending: NURSE PRACTITIONER
Payer: MEDICARE

## 2022-09-14 VITALS
OXYGEN SATURATION: 98 % | RESPIRATION RATE: 18 BRPM | BODY MASS INDEX: 23.99 KG/M2 | SYSTOLIC BLOOD PRESSURE: 132 MMHG | TEMPERATURE: 98 F | WEIGHT: 181 LBS | HEART RATE: 77 BPM | DIASTOLIC BLOOD PRESSURE: 84 MMHG | HEIGHT: 73 IN

## 2022-09-14 DIAGNOSIS — E78.5 HYPERLIPIDEMIA, UNSPECIFIED HYPERLIPIDEMIA TYPE: ICD-10-CM

## 2022-09-14 DIAGNOSIS — M25.512 ACUTE PAIN OF LEFT SHOULDER: Primary | ICD-10-CM

## 2022-09-14 DIAGNOSIS — N52.9 ERECTILE DYSFUNCTION, UNSPECIFIED ERECTILE DYSFUNCTION TYPE: ICD-10-CM

## 2022-09-14 DIAGNOSIS — Z13.29 SCREENING FOR THYROID DISORDER: ICD-10-CM

## 2022-09-14 DIAGNOSIS — Z12.5 SCREENING FOR PROSTATE CANCER: ICD-10-CM

## 2022-09-14 DIAGNOSIS — Z13.1 SCREENING FOR DIABETES MELLITUS: ICD-10-CM

## 2022-09-14 DIAGNOSIS — Z13.220 SCREENING CHOLESTEROL LEVEL: ICD-10-CM

## 2022-09-14 DIAGNOSIS — R33.8 BENIGN PROSTATIC HYPERPLASIA WITH URINARY RETENTION: ICD-10-CM

## 2022-09-14 DIAGNOSIS — Z79.899 MEDICATION MANAGEMENT: ICD-10-CM

## 2022-09-14 DIAGNOSIS — Z00.00 PREVENTATIVE HEALTH CARE: ICD-10-CM

## 2022-09-14 DIAGNOSIS — N40.1 BENIGN PROSTATIC HYPERPLASIA WITH URINARY RETENTION: ICD-10-CM

## 2022-09-14 DIAGNOSIS — M25.512 ACUTE PAIN OF LEFT SHOULDER: ICD-10-CM

## 2022-09-14 DIAGNOSIS — Z01.00 ENCOUNTER FOR ROUTINE EYE AND VISION EXAMINATION: ICD-10-CM

## 2022-09-14 DIAGNOSIS — J30.89 ALLERGIC RHINITIS DUE TO OTHER ALLERGIC TRIGGER, UNSPECIFIED SEASONALITY: ICD-10-CM

## 2022-09-14 LAB
BILIRUB UR QL STRIP: NEGATIVE
CLARITY UR REFRACT.AUTO: CLEAR
COLOR UR AUTO: YELLOW
GLUCOSE UR QL STRIP: NEGATIVE
HGB UR QL STRIP: NEGATIVE
KETONES UR QL STRIP: NEGATIVE
LEUKOCYTE ESTERASE UR QL STRIP: NEGATIVE
NITRITE UR QL STRIP: NEGATIVE
PH UR STRIP: 6 [PH] (ref 5–8)
PROT UR QL STRIP: NEGATIVE
SP GR UR STRIP: 1.02 (ref 1–1.03)
URN SPEC COLLECT METH UR: NORMAL

## 2022-09-14 PROCEDURE — 73030 X-RAY EXAM OF SHOULDER: CPT | Mod: TC,PN,LT

## 2022-09-14 PROCEDURE — 73030 X-RAY EXAM OF SHOULDER: CPT | Mod: 26,LT,, | Performed by: RADIOLOGY

## 2022-09-14 PROCEDURE — 99999 PR PBB SHADOW E&M-EST. PATIENT-LVL V: ICD-10-PCS | Mod: PBBFAC,,, | Performed by: NURSE PRACTITIONER

## 2022-09-14 PROCEDURE — 96372 THER/PROPH/DIAG INJ SC/IM: CPT | Mod: S$GLB,,, | Performed by: NURSE PRACTITIONER

## 2022-09-14 PROCEDURE — 3008F BODY MASS INDEX DOCD: CPT | Mod: CPTII,S$GLB,, | Performed by: NURSE PRACTITIONER

## 2022-09-14 PROCEDURE — 3288F PR FALLS RISK ASSESSMENT DOCUMENTED: ICD-10-PCS | Mod: CPTII,S$GLB,, | Performed by: NURSE PRACTITIONER

## 2022-09-14 PROCEDURE — 1125F AMNT PAIN NOTED PAIN PRSNT: CPT | Mod: CPTII,S$GLB,, | Performed by: NURSE PRACTITIONER

## 2022-09-14 PROCEDURE — 73030 XR SHOULDER COMPLETE 2 OR MORE VIEWS LEFT: ICD-10-PCS | Mod: 26,LT,, | Performed by: RADIOLOGY

## 2022-09-14 PROCEDURE — 1101F PT FALLS ASSESS-DOCD LE1/YR: CPT | Mod: CPTII,S$GLB,, | Performed by: NURSE PRACTITIONER

## 2022-09-14 PROCEDURE — 81003 URINALYSIS AUTO W/O SCOPE: CPT | Performed by: NURSE PRACTITIONER

## 2022-09-14 PROCEDURE — 96372 PR INJECTION,THERAP/PROPH/DIAG2ST, IM OR SUBCUT: ICD-10-PCS | Mod: S$GLB,,, | Performed by: NURSE PRACTITIONER

## 2022-09-14 PROCEDURE — 1101F PR PT FALLS ASSESS DOC 0-1 FALLS W/OUT INJ PAST YR: ICD-10-PCS | Mod: CPTII,S$GLB,, | Performed by: NURSE PRACTITIONER

## 2022-09-14 PROCEDURE — 1159F MED LIST DOCD IN RCRD: CPT | Mod: CPTII,S$GLB,, | Performed by: NURSE PRACTITIONER

## 2022-09-14 PROCEDURE — 3288F FALL RISK ASSESSMENT DOCD: CPT | Mod: CPTII,S$GLB,, | Performed by: NURSE PRACTITIONER

## 2022-09-14 PROCEDURE — 99214 PR OFFICE/OUTPT VISIT, EST, LEVL IV, 30-39 MIN: ICD-10-PCS | Mod: 25,S$GLB,, | Performed by: NURSE PRACTITIONER

## 2022-09-14 PROCEDURE — 3075F PR MOST RECENT SYSTOLIC BLOOD PRESS GE 130-139MM HG: ICD-10-PCS | Mod: CPTII,S$GLB,, | Performed by: NURSE PRACTITIONER

## 2022-09-14 PROCEDURE — 1125F PR PAIN SEVERITY QUANTIFIED, PAIN PRESENT: ICD-10-PCS | Mod: CPTII,S$GLB,, | Performed by: NURSE PRACTITIONER

## 2022-09-14 PROCEDURE — 3075F SYST BP GE 130 - 139MM HG: CPT | Mod: CPTII,S$GLB,, | Performed by: NURSE PRACTITIONER

## 2022-09-14 PROCEDURE — 99999 PR PBB SHADOW E&M-EST. PATIENT-LVL V: CPT | Mod: PBBFAC,,, | Performed by: NURSE PRACTITIONER

## 2022-09-14 PROCEDURE — 99214 OFFICE O/P EST MOD 30 MIN: CPT | Mod: 25,S$GLB,, | Performed by: NURSE PRACTITIONER

## 2022-09-14 PROCEDURE — 3079F DIAST BP 80-89 MM HG: CPT | Mod: CPTII,S$GLB,, | Performed by: NURSE PRACTITIONER

## 2022-09-14 PROCEDURE — 3079F PR MOST RECENT DIASTOLIC BLOOD PRESSURE 80-89 MM HG: ICD-10-PCS | Mod: CPTII,S$GLB,, | Performed by: NURSE PRACTITIONER

## 2022-09-14 PROCEDURE — 1159F PR MEDICATION LIST DOCUMENTED IN MEDICAL RECORD: ICD-10-PCS | Mod: CPTII,S$GLB,, | Performed by: NURSE PRACTITIONER

## 2022-09-14 PROCEDURE — 3008F PR BODY MASS INDEX (BMI) DOCUMENTED: ICD-10-PCS | Mod: CPTII,S$GLB,, | Performed by: NURSE PRACTITIONER

## 2022-09-14 RX ORDER — KETOROLAC TROMETHAMINE 30 MG/ML
30 INJECTION, SOLUTION INTRAMUSCULAR; INTRAVENOUS
Status: COMPLETED | OUTPATIENT
Start: 2022-09-14 | End: 2022-09-14

## 2022-09-14 RX ORDER — KETOROLAC TROMETHAMINE 30 MG/ML
15 INJECTION, SOLUTION INTRAMUSCULAR; INTRAVENOUS
Status: DISCONTINUED | OUTPATIENT
Start: 2022-09-14 | End: 2022-09-14

## 2022-09-14 RX ORDER — DEXAMETHASONE SODIUM PHOSPHATE 100 MG/10ML
10 INJECTION INTRAMUSCULAR; INTRAVENOUS
Status: COMPLETED | OUTPATIENT
Start: 2022-09-14 | End: 2022-09-14

## 2022-09-14 RX ORDER — IBUPROFEN 800 MG/1
800 TABLET ORAL EVERY 6 HOURS PRN
Qty: 12 TABLET | Refills: 0 | Status: SHIPPED | OUTPATIENT
Start: 2022-09-14

## 2022-09-14 RX ORDER — TAMSULOSIN HYDROCHLORIDE 0.4 MG/1
0.4 CAPSULE ORAL DAILY
Qty: 90 CAPSULE | Refills: 2 | Status: SHIPPED | OUTPATIENT
Start: 2022-09-14 | End: 2022-12-07

## 2022-09-14 RX ORDER — ATORVASTATIN CALCIUM 40 MG/1
40 TABLET, FILM COATED ORAL DAILY
Qty: 90 TABLET | Refills: 3 | Status: SHIPPED | OUTPATIENT
Start: 2022-09-14 | End: 2022-12-07

## 2022-09-14 RX ORDER — MONTELUKAST SODIUM 10 MG/1
10 TABLET ORAL DAILY
Qty: 90 TABLET | Refills: 3 | Status: SHIPPED | OUTPATIENT
Start: 2022-09-14 | End: 2022-12-07

## 2022-09-14 RX ADMIN — DEXAMETHASONE SODIUM PHOSPHATE 10 MG: 100 INJECTION INTRAMUSCULAR; INTRAVENOUS at 12:09

## 2022-09-14 RX ADMIN — KETOROLAC TROMETHAMINE 30 MG: 30 INJECTION, SOLUTION INTRAMUSCULAR; INTRAVENOUS at 12:09

## 2022-09-14 NOTE — PROGRESS NOTES
"Subjective:       Patient ID: Saba Hansen is a 67 y.o. male.    Chief Complaint: Annual Exam    Mr. Saba Hansen is a 67 year old male, new to me, presents to the clinic with left shoulder pain and preventative care concerns . PCP is Drew Avendano. Medical and surgical history in addition to problem list reviewed as listed below.    Presents with concerns about preventative care, left shoulder pain and sexual dysfunction.     Shoulder Pain   The pain is present in the left shoulder ("clicking" with movement). This is a new problem. The current episode started more than 1 month ago. There has been no history of extremity trauma. The problem occurs constantly. The problem has been unchanged. The quality of the pain is described as aching. The pain is at a severity of 6/10. The pain is moderate. Associated symptoms include a limited range of motion and stiffness. Pertinent negatives include no fever, headaches, inability to bear weight, itching, joint locking, joint swelling, numbness, tingling or visual symptoms. The symptoms are aggravated by activity. He has tried nothing for the symptoms.   Erectile Dysfunction  This is a new problem. The current episode started more than 1 month ago. The problem is unchanged. The nature of his difficulty is achieving erection, maintaining erection and penetration. Non-physiologic factors contributing to erectile dysfunction are performance anxiety. He reports his erection duration to be less than 1 minute. Irritative symptoms include nocturia. Obstructive symptoms include dribbling and incomplete emptying. Pertinent negatives include no chills.     Past Medical History:   Diagnosis Date    Allergy     Asthma         Past Surgical History:   Procedure Laterality Date    AORTOPEXY W/ MLB      NASAL SINUS SURGERY      TONSILLECTOMY          Family History   Problem Relation Age of Onset    Hypertension Mother     No Known Problems Father     Allergic rhinitis Neg Hx     Allergies " Neg Hx     Angioedema Neg Hx     Asthma Neg Hx     Atopy Neg Hx     Rhinitis Neg Hx     Urticaria Neg Hx     Immunodeficiency Neg Hx     Eczema Neg Hx        Social History     Tobacco Use   Smoking Status Never   Smokeless Tobacco Never       Social History     Social History Narrative    Not on file       Review of patient's allergies indicates:   Allergen Reactions    Iodinated contrast media      Hives (skin)^and itchy skin  Hives (skin)^and itchy skin          Review of Systems   Constitutional:  Negative for chills and fever.   Respiratory: Negative.     Cardiovascular: Negative.    Gastrointestinal: Negative.    Genitourinary:  Positive for incomplete emptying and nocturia.   Musculoskeletal:  Positive for stiffness.        Left shoulder pain/discomfort   Skin:  Negative for itching.   Neurological:  Negative for tingling, numbness and headaches.       Objective:        Vitals:    09/14/22 1127   BP: 132/84   Pulse: 77   Resp: 18   Temp: 97.7 °F (36.5 °C)        Physical Exam  Constitutional:       Appearance: He is well-developed.   HENT:      Head: Normocephalic and atraumatic.      Right Ear: External ear normal.      Left Ear: External ear normal.   Eyes:      General: No scleral icterus.     Extraocular Movements: Extraocular movements intact.      Conjunctiva/sclera: Conjunctivae normal.   Cardiovascular:      Rate and Rhythm: Normal rate and regular rhythm.      Heart sounds: Normal heart sounds.   Pulmonary:      Effort: Pulmonary effort is normal.      Breath sounds: Normal breath sounds.   Musculoskeletal:         General: Tenderness (left shoulder) present.      Left shoulder: Tenderness present. Decreased range of motion. Decreased strength.      Cervical back: Normal range of motion.   Skin:     General: Skin is warm and dry.      Findings: No erythema or rash.   Neurological:      Mental Status: He is alert and oriented to person, place, and time.      Motor: No abnormal muscle tone.    Psychiatric:         Behavior: Behavior normal.       Assessment:       1. Acute pain of left shoulder    2. Erectile dysfunction, unspecified erectile dysfunction type    3. Preventative health care    4. Encounter for routine eye and vision examination    5. Screening for thyroid disorder    6. Screening cholesterol level    7. Screening for diabetes mellitus    8. Screening for prostate cancer    9. Medication management    10. Benign prostatic hyperplasia with urinary retention    11. Hyperlipidemia, unspecified hyperlipidemia type    12. Allergic rhinitis due to other allergic trigger, unspecified seasonality        Plan:       Acute pain of left shoulder  -     ROM Exercises as Tolerated.  -     dexamethasone injection 10 mg  -     X-Ray Shoulder 2 or More Views Left; Future; Expected date: 09/14/2022  -     ibuprofen (ADVIL,MOTRIN) 800 MG tablet; Take 1 tablet (800 mg total) by mouth every 6 (six) hours as needed for Pain (L shoulder pain).  Dispense: 12 tablet; Refill: 0  -     ketorolac injection 30 mg    Erectile dysfunction, unspecified erectile dysfunction type  -     Ambulatory referral/consult to Urology; Future; Expected date: 09/21/2022    Preventative health care  -     CBC Auto Differential; Future; Expected date: 09/14/2022  -     Comprehensive Metabolic Panel; Future; Expected date: 09/14/2022    Encounter for routine eye and vision examination  -     Ambulatory referral/consult to Ophthalmology; Future; Expected date: 09/21/2022    Screening for thyroid disorder  -     TSH; Future; Expected date: 09/14/2022    Screening cholesterol level  -     Lipid Panel; Future; Expected date: 09/14/2022    Screening for diabetes mellitus  -     Hemoglobin A1C; Future; Expected date: 09/14/2022    Screening for prostate cancer  -     PSA, Screening; Future; Expected date: 09/14/2022    Medication management  -     CBC Auto Differential; Future; Expected date: 09/14/2022  -     Comprehensive Metabolic Panel;  Future; Expected date: 09/14/2022  -     TSH; Future; Expected date: 09/14/2022  -     Lipid Panel; Future; Expected date: 09/14/2022  -     Hemoglobin A1C; Future; Expected date: 09/14/2022  -     PSA, Screening; Future; Expected date: 09/14/2022    Benign prostatic hyperplasia with urinary retention  -     tamsulosin (FLOMAX) 0.4 mg Cap; Take 1 capsule (0.4 mg total) by mouth once daily.  Dispense: 90 capsule; Refill: 2  -     Urinalysis, Reflex to Urine Culture Urine, Clean Catch    Hyperlipidemia, unspecified hyperlipidemia type  -     atorvastatin (LIPITOR) 40 MG tablet; Take 1 tablet (40 mg total) by mouth once daily.  Dispense: 90 tablet; Refill: 3    Allergic rhinitis due to other allergic trigger, unspecified seasonality  -     montelukast (SINGULAIR) 10 mg tablet; Take 1 tablet (10 mg total) by mouth once daily.  Dispense: 90 tablet; Refill: 3       Schedule appointment to see PCP for annual examination.  Pending X-ray of shoulder, consider Physical Therapy.  Alternate Tylenol Arthritis and Ibuprofen for pain/discomfort to left shoulder.    Medication List with Changes/Refills   New Medications    IBUPROFEN (ADVIL,MOTRIN) 800 MG TABLET    Take 1 tablet (800 mg total) by mouth every 6 (six) hours as needed for Pain (L shoulder pain).   Current Medications    ADVAIR -21 MCG/ACTUATION HFAA INHALER    INHALE 2 PUFFS BY MOUTH TWICE DAILY    ALBUTEROL (PROAIR HFA) 90 MCG/ACTUATION INHALER    Inhale 2 puffs into the lungs every 6 (six) hours as needed for Wheezing. Rescue    CETIRIZINE (ZYRTEC) 10 MG TABLET    Take 1 tablet (10 mg total) by mouth once daily.    COMP-AIR NEBULIZER COMPRESSOR RAFY    use as directed    FINASTERIDE (PROSCAR) 5 MG TABLET    Take 1 tablet (5 mg total) by mouth once daily.    FLUTICASONE PROPIONATE (FLONASE) 50 MCG/ACTUATION NASAL SPRAY    1 spray (50 mcg total) by Each Nostril route once daily.   Changed and/or Refilled Medications    Modified Medication Previous Medication     ATORVASTATIN (LIPITOR) 40 MG TABLET atorvastatin (LIPITOR) 40 MG tablet       Take 1 tablet (40 mg total) by mouth once daily.    Take 1 tablet (40 mg total) by mouth once daily.    MONTELUKAST (SINGULAIR) 10 MG TABLET montelukast (SINGULAIR) 10 mg tablet       Take 1 tablet (10 mg total) by mouth once daily.    TAKE 1 TABLET(10 MG) BY MOUTH EVERY DAY    TAMSULOSIN (FLOMAX) 0.4 MG CAP tamsulosin (FLOMAX) 0.4 mg Cap       Take 1 capsule (0.4 mg total) by mouth once daily.    Take 1 capsule (0.4 mg total) by mouth once daily.            Follow up in about 2 weeks (around 9/28/2022) for Molly Barrios, MSN, APRN, FNP-C.    FROY Elias, MSN, FNP-C

## 2022-09-15 ENCOUNTER — OFFICE VISIT (OUTPATIENT)
Dept: UROLOGY | Facility: CLINIC | Age: 67
End: 2022-09-15
Payer: MEDICARE

## 2022-09-15 VITALS
DIASTOLIC BLOOD PRESSURE: 78 MMHG | BODY MASS INDEX: 24.25 KG/M2 | WEIGHT: 183 LBS | HEART RATE: 83 BPM | SYSTOLIC BLOOD PRESSURE: 120 MMHG | HEIGHT: 73 IN

## 2022-09-15 DIAGNOSIS — N13.8 BENIGN PROSTATIC HYPERPLASIA WITH URINARY OBSTRUCTION: ICD-10-CM

## 2022-09-15 DIAGNOSIS — N48.6 PEYRONIE DISEASE: ICD-10-CM

## 2022-09-15 DIAGNOSIS — N40.1 BENIGN PROSTATIC HYPERPLASIA WITH URINARY OBSTRUCTION: ICD-10-CM

## 2022-09-15 DIAGNOSIS — N52.9 ERECTILE DYSFUNCTION, UNSPECIFIED ERECTILE DYSFUNCTION TYPE: ICD-10-CM

## 2022-09-15 DIAGNOSIS — R97.20 ELEVATED PSA: Primary | ICD-10-CM

## 2022-09-15 PROCEDURE — 3044F HG A1C LEVEL LT 7.0%: CPT | Mod: CPTII,S$GLB,, | Performed by: NURSE PRACTITIONER

## 2022-09-15 PROCEDURE — 99214 PR OFFICE/OUTPT VISIT, EST, LEVL IV, 30-39 MIN: ICD-10-PCS | Mod: S$GLB,,, | Performed by: NURSE PRACTITIONER

## 2022-09-15 PROCEDURE — 1101F PR PT FALLS ASSESS DOC 0-1 FALLS W/OUT INJ PAST YR: ICD-10-PCS | Mod: CPTII,S$GLB,, | Performed by: NURSE PRACTITIONER

## 2022-09-15 PROCEDURE — 99999 PR PBB SHADOW E&M-EST. PATIENT-LVL III: CPT | Mod: PBBFAC,,, | Performed by: NURSE PRACTITIONER

## 2022-09-15 PROCEDURE — 3008F PR BODY MASS INDEX (BMI) DOCUMENTED: ICD-10-PCS | Mod: CPTII,S$GLB,, | Performed by: NURSE PRACTITIONER

## 2022-09-15 PROCEDURE — 1159F PR MEDICATION LIST DOCUMENTED IN MEDICAL RECORD: ICD-10-PCS | Mod: CPTII,S$GLB,, | Performed by: NURSE PRACTITIONER

## 2022-09-15 PROCEDURE — 3288F PR FALLS RISK ASSESSMENT DOCUMENTED: ICD-10-PCS | Mod: CPTII,S$GLB,, | Performed by: NURSE PRACTITIONER

## 2022-09-15 PROCEDURE — 3078F DIAST BP <80 MM HG: CPT | Mod: CPTII,S$GLB,, | Performed by: NURSE PRACTITIONER

## 2022-09-15 PROCEDURE — 3074F SYST BP LT 130 MM HG: CPT | Mod: CPTII,S$GLB,, | Performed by: NURSE PRACTITIONER

## 2022-09-15 PROCEDURE — 1126F PR PAIN SEVERITY QUANTIFIED, NO PAIN PRESENT: ICD-10-PCS | Mod: CPTII,S$GLB,, | Performed by: NURSE PRACTITIONER

## 2022-09-15 PROCEDURE — 1159F MED LIST DOCD IN RCRD: CPT | Mod: CPTII,S$GLB,, | Performed by: NURSE PRACTITIONER

## 2022-09-15 PROCEDURE — 3074F PR MOST RECENT SYSTOLIC BLOOD PRESSURE < 130 MM HG: ICD-10-PCS | Mod: CPTII,S$GLB,, | Performed by: NURSE PRACTITIONER

## 2022-09-15 PROCEDURE — 1160F PR REVIEW ALL MEDS BY PRESCRIBER/CLIN PHARMACIST DOCUMENTED: ICD-10-PCS | Mod: CPTII,S$GLB,, | Performed by: NURSE PRACTITIONER

## 2022-09-15 PROCEDURE — 3078F PR MOST RECENT DIASTOLIC BLOOD PRESSURE < 80 MM HG: ICD-10-PCS | Mod: CPTII,S$GLB,, | Performed by: NURSE PRACTITIONER

## 2022-09-15 PROCEDURE — 99999 PR PBB SHADOW E&M-EST. PATIENT-LVL III: ICD-10-PCS | Mod: PBBFAC,,, | Performed by: NURSE PRACTITIONER

## 2022-09-15 PROCEDURE — 99214 OFFICE O/P EST MOD 30 MIN: CPT | Mod: S$GLB,,, | Performed by: NURSE PRACTITIONER

## 2022-09-15 PROCEDURE — 3044F PR MOST RECENT HEMOGLOBIN A1C LEVEL <7.0%: ICD-10-PCS | Mod: CPTII,S$GLB,, | Performed by: NURSE PRACTITIONER

## 2022-09-15 PROCEDURE — 1126F AMNT PAIN NOTED NONE PRSNT: CPT | Mod: CPTII,S$GLB,, | Performed by: NURSE PRACTITIONER

## 2022-09-15 PROCEDURE — 1160F RVW MEDS BY RX/DR IN RCRD: CPT | Mod: CPTII,S$GLB,, | Performed by: NURSE PRACTITIONER

## 2022-09-15 PROCEDURE — 3288F FALL RISK ASSESSMENT DOCD: CPT | Mod: CPTII,S$GLB,, | Performed by: NURSE PRACTITIONER

## 2022-09-15 PROCEDURE — 1101F PT FALLS ASSESS-DOCD LE1/YR: CPT | Mod: CPTII,S$GLB,, | Performed by: NURSE PRACTITIONER

## 2022-09-15 PROCEDURE — 3008F BODY MASS INDEX DOCD: CPT | Mod: CPTII,S$GLB,, | Performed by: NURSE PRACTITIONER

## 2022-09-15 RX ORDER — TADALAFIL 20 MG/1
20 TABLET ORAL DAILY
Qty: 20 TABLET | Refills: 11 | Status: SHIPPED | OUTPATIENT
Start: 2022-09-15 | End: 2023-12-01 | Stop reason: ALTCHOICE

## 2022-09-15 NOTE — PROGRESS NOTES
CHIEF COMPLAINT:    Mr. Hansen is a 67 y.o. male presenting for   Chief Complaint   Patient presents with    Erectile Dysfunction       PRESENTING ILLNESS:    Saba Hansen is a 67 y.o. male with a PMH of sinusitis, asthma, bph who presents for erectile dysfunction.    Established patient to urology department.  Last seen in urology department 10/20/21.  Presents today for erectile dysfunction.  Reports able to achieve erection, but not maintain.   Also states has curvature of penis when erect.  This does interfere with intercourse and not painful at this time.  Interested in starting medication for erectile dysfunction.  Cialis prescribed to pharmacy,    He reports a intermittent urinary stream and complete bladder emptying.  Having some urgency at times intermixed with hesitancy.  Not bothersome to him at this time.  Taking both finateride and tamsulosin as prescribed.    No family history of prostate cancer.  Had TRUS biopsy on 4/14/21 when PSA 9.1.  Biopsy remarkable for bph only.  Reviewed recent PSA of 4.4 (8.8 proscar adjusted, previously 6.1).  YRN unremarkable today.  Will plan to repeat PSA in 1 month to ensure not rising.      REVIEW OF SYSTEMS:    Review of Systems   Constitutional:  Negative for chills and fever.   Respiratory:  Negative for shortness of breath.    Cardiovascular:  Negative for chest pain.   Gastrointestinal:  Negative for constipation and diarrhea.   Genitourinary:  Negative for dysuria, flank pain, frequency, hematuria and urgency.        Curve of penis     Neurological:  Negative for dizziness and weakness.     PATIENT HISTORY:    Past Medical History:   Diagnosis Date    Allergy     Asthma        Family History   Problem Relation Age of Onset    Hypertension Mother     No Known Problems Father     Allergic rhinitis Neg Hx     Allergies Neg Hx     Angioedema Neg Hx     Asthma Neg Hx     Atopy Neg Hx     Rhinitis Neg Hx     Urticaria Neg Hx     Immunodeficiency Neg Hx     Eczema Neg  Hx        Allergies:  Iodinated contrast media    Medications:    Current Outpatient Medications:     ADVAIR -21 mcg/actuation HFAA inhaler, INHALE 2 PUFFS BY MOUTH TWICE DAILY, Disp: 12 g, Rfl: 3    albuterol (PROAIR HFA) 90 mcg/actuation inhaler, Inhale 2 puffs into the lungs every 6 (six) hours as needed for Wheezing. Rescue, Disp: 18 g, Rfl: 2    atorvastatin (LIPITOR) 40 MG tablet, Take 1 tablet (40 mg total) by mouth once daily., Disp: 90 tablet, Rfl: 3    COMP-AIR NEBULIZER COMPRESSOR Kary, use as directed, Disp: , Rfl:     finasteride (PROSCAR) 5 mg tablet, Take 1 tablet (5 mg total) by mouth once daily., Disp: 30 tablet, Rfl: 11    fluticasone propionate (FLONASE) 50 mcg/actuation nasal spray, 1 spray (50 mcg total) by Each Nostril route once daily., Disp: 16 g, Rfl: 5    ibuprofen (ADVIL,MOTRIN) 800 MG tablet, Take 1 tablet (800 mg total) by mouth every 6 (six) hours as needed for Pain (L shoulder pain)., Disp: 12 tablet, Rfl: 0    montelukast (SINGULAIR) 10 mg tablet, Take 1 tablet (10 mg total) by mouth once daily., Disp: 90 tablet, Rfl: 3    tamsulosin (FLOMAX) 0.4 mg Cap, Take 1 capsule (0.4 mg total) by mouth once daily., Disp: 90 capsule, Rfl: 2    cetirizine (ZYRTEC) 10 MG tablet, Take 1 tablet (10 mg total) by mouth once daily., Disp: 90 tablet, Rfl: 3    tadalafiL (CIALIS) 20 MG Tab, Take 1 tablet (20 mg total) by mouth once daily., Disp: 20 tablet, Rfl: 11    Current Facility-Administered Medications:     LIDOcaine HCL 2% jelly, , Topical (Top), Once, Molly Plaza PA-C    PHYSICAL EXAMINATION:    Physical Exam  Vitals and nursing note reviewed.   Constitutional:       Appearance: Normal appearance. He is well-developed.   HENT:      Head: Normocephalic and atraumatic.   Eyes:      Pupils: Pupils are equal, round, and reactive to light.   Pulmonary:      Effort: Pulmonary effort is normal.   Genitourinary:     Penis: Normal.       Testes: Normal.      Prostate: Enlarged. Not tender.       Rectum: Normal.      Comments: Prostate smooth, no nodules felt  No scar tissue noted on penis  Musculoskeletal:         General: Normal range of motion.      Cervical back: Normal range of motion.   Skin:     General: Skin is warm and dry.   Neurological:      Mental Status: He is alert and oriented to person, place, and time.   Psychiatric:         Behavior: Behavior normal.       LABS:    Lab Results   Component Value Date    PSA 4.4 (H) 09/14/2022    PSA 5.1 (H) 02/01/2021    PSADIAG 6.5 (H) 10/18/2021    PSADIAG 9.1 (H) 03/15/2021    PSADIAG 3.8 10/24/2017       IMPRESSION:  Encounter Diagnoses   Name Primary?    Elevated PSA Yes    Erectile dysfunction, unspecified erectile dysfunction type     Peyronie disease     Benign prostatic hyperplasia with urinary obstruction          PLAN:  Problem List Items Addressed This Visit    None  Visit Diagnoses       Elevated PSA    -  Primary    Relevant Orders    PROSTATE SPECIFIC ANTIGEN, DIAGNOSTIC    Erectile dysfunction, unspecified erectile dysfunction type        Peyronie disease        Benign prostatic hyperplasia with urinary obstruction                1. Erectile dysfunction   - Trial of cialis.  Discussed side effects including but not limited to myalgia, headaches and priapism.  He voiced understanding.   2.  Peyronie's disease   Not bothersome to him at this time  3.  Elevated PSA   Reviewed last PSA , repeat in 1 mth   YRN completed  4. Bph with obstruction   Continue finasteride and tamsulosin  5. Rtc based on lab result or 1 yr    I spent over 45 minutes with the patient. Over 50% of the visit was spent in counseling.        Shannen Couch NP

## 2022-09-29 ENCOUNTER — LAB VISIT (OUTPATIENT)
Dept: PRIMARY CARE CLINIC | Facility: CLINIC | Age: 67
End: 2022-09-29
Payer: MEDICARE

## 2022-09-29 ENCOUNTER — OFFICE VISIT (OUTPATIENT)
Dept: PRIMARY CARE CLINIC | Facility: CLINIC | Age: 67
End: 2022-09-29
Payer: MEDICARE

## 2022-09-29 VITALS
BODY MASS INDEX: 24.54 KG/M2 | SYSTOLIC BLOOD PRESSURE: 114 MMHG | OXYGEN SATURATION: 97 % | WEIGHT: 185.19 LBS | HEIGHT: 73 IN | TEMPERATURE: 98 F | DIASTOLIC BLOOD PRESSURE: 78 MMHG | HEART RATE: 76 BPM

## 2022-09-29 DIAGNOSIS — M75.32 CALCIFIC TENDINITIS OF LEFT SHOULDER: Primary | ICD-10-CM

## 2022-09-29 DIAGNOSIS — Z23 NEEDS FLU SHOT: ICD-10-CM

## 2022-09-29 DIAGNOSIS — R97.20 ELEVATED PSA: ICD-10-CM

## 2022-09-29 DIAGNOSIS — M19.012 OSTEOARTHRITIS OF LEFT SHOULDER, UNSPECIFIED OSTEOARTHRITIS TYPE: ICD-10-CM

## 2022-09-29 LAB — COMPLEXED PSA SERPL-MCNC: 4 NG/ML (ref 0–4)

## 2022-09-29 PROCEDURE — 96372 PR INJECTION,THERAP/PROPH/DIAG2ST, IM OR SUBCUT: ICD-10-PCS | Mod: 59,S$GLB,, | Performed by: NURSE PRACTITIONER

## 2022-09-29 PROCEDURE — 3078F DIAST BP <80 MM HG: CPT | Mod: CPTII,S$GLB,, | Performed by: NURSE PRACTITIONER

## 2022-09-29 PROCEDURE — 90694 VACC AIIV4 NO PRSRV 0.5ML IM: CPT | Mod: S$GLB,,, | Performed by: NURSE PRACTITIONER

## 2022-09-29 PROCEDURE — 3074F PR MOST RECENT SYSTOLIC BLOOD PRESSURE < 130 MM HG: ICD-10-PCS | Mod: CPTII,S$GLB,, | Performed by: NURSE PRACTITIONER

## 2022-09-29 PROCEDURE — 3044F PR MOST RECENT HEMOGLOBIN A1C LEVEL <7.0%: ICD-10-PCS | Mod: CPTII,S$GLB,, | Performed by: NURSE PRACTITIONER

## 2022-09-29 PROCEDURE — 1101F PT FALLS ASSESS-DOCD LE1/YR: CPT | Mod: CPTII,S$GLB,, | Performed by: NURSE PRACTITIONER

## 2022-09-29 PROCEDURE — 99213 OFFICE O/P EST LOW 20 MIN: CPT | Mod: 25,S$GLB,, | Performed by: NURSE PRACTITIONER

## 2022-09-29 PROCEDURE — 99999 PR PBB SHADOW E&M-EST. PATIENT-LVL V: ICD-10-PCS | Mod: PBBFAC,,, | Performed by: NURSE PRACTITIONER

## 2022-09-29 PROCEDURE — 99999 PR PBB SHADOW E&M-EST. PATIENT-LVL V: CPT | Mod: PBBFAC,,, | Performed by: NURSE PRACTITIONER

## 2022-09-29 PROCEDURE — 84153 ASSAY OF PSA TOTAL: CPT | Performed by: NURSE PRACTITIONER

## 2022-09-29 PROCEDURE — G0008 FLU VACCINE - QUADRIVALENT - ADJUVANTED: ICD-10-PCS | Mod: S$GLB,,, | Performed by: NURSE PRACTITIONER

## 2022-09-29 PROCEDURE — G0008 ADMIN INFLUENZA VIRUS VAC: HCPCS | Mod: S$GLB,,, | Performed by: NURSE PRACTITIONER

## 2022-09-29 PROCEDURE — 99213 PR OFFICE/OUTPT VISIT, EST, LEVL III, 20-29 MIN: ICD-10-PCS | Mod: 25,S$GLB,, | Performed by: NURSE PRACTITIONER

## 2022-09-29 PROCEDURE — 1125F AMNT PAIN NOTED PAIN PRSNT: CPT | Mod: CPTII,S$GLB,, | Performed by: NURSE PRACTITIONER

## 2022-09-29 PROCEDURE — 3074F SYST BP LT 130 MM HG: CPT | Mod: CPTII,S$GLB,, | Performed by: NURSE PRACTITIONER

## 2022-09-29 PROCEDURE — 1159F PR MEDICATION LIST DOCUMENTED IN MEDICAL RECORD: ICD-10-PCS | Mod: CPTII,S$GLB,, | Performed by: NURSE PRACTITIONER

## 2022-09-29 PROCEDURE — 3008F BODY MASS INDEX DOCD: CPT | Mod: CPTII,S$GLB,, | Performed by: NURSE PRACTITIONER

## 2022-09-29 PROCEDURE — 1101F PR PT FALLS ASSESS DOC 0-1 FALLS W/OUT INJ PAST YR: ICD-10-PCS | Mod: CPTII,S$GLB,, | Performed by: NURSE PRACTITIONER

## 2022-09-29 PROCEDURE — 3008F PR BODY MASS INDEX (BMI) DOCUMENTED: ICD-10-PCS | Mod: CPTII,S$GLB,, | Performed by: NURSE PRACTITIONER

## 2022-09-29 PROCEDURE — 3288F FALL RISK ASSESSMENT DOCD: CPT | Mod: CPTII,S$GLB,, | Performed by: NURSE PRACTITIONER

## 2022-09-29 PROCEDURE — 3044F HG A1C LEVEL LT 7.0%: CPT | Mod: CPTII,S$GLB,, | Performed by: NURSE PRACTITIONER

## 2022-09-29 PROCEDURE — 1125F PR PAIN SEVERITY QUANTIFIED, PAIN PRESENT: ICD-10-PCS | Mod: CPTII,S$GLB,, | Performed by: NURSE PRACTITIONER

## 2022-09-29 PROCEDURE — 3078F PR MOST RECENT DIASTOLIC BLOOD PRESSURE < 80 MM HG: ICD-10-PCS | Mod: CPTII,S$GLB,, | Performed by: NURSE PRACTITIONER

## 2022-09-29 PROCEDURE — 96372 THER/PROPH/DIAG INJ SC/IM: CPT | Mod: 59,S$GLB,, | Performed by: NURSE PRACTITIONER

## 2022-09-29 PROCEDURE — 90694 FLU VACCINE - QUADRIVALENT - ADJUVANTED: ICD-10-PCS | Mod: S$GLB,,, | Performed by: NURSE PRACTITIONER

## 2022-09-29 PROCEDURE — 3288F PR FALLS RISK ASSESSMENT DOCUMENTED: ICD-10-PCS | Mod: CPTII,S$GLB,, | Performed by: NURSE PRACTITIONER

## 2022-09-29 PROCEDURE — 1159F MED LIST DOCD IN RCRD: CPT | Mod: CPTII,S$GLB,, | Performed by: NURSE PRACTITIONER

## 2022-09-29 RX ORDER — KETOROLAC TROMETHAMINE 30 MG/ML
30 INJECTION, SOLUTION INTRAMUSCULAR; INTRAVENOUS
Status: COMPLETED | OUTPATIENT
Start: 2022-09-29 | End: 2022-09-29

## 2022-09-29 RX ORDER — METHYLPREDNISOLONE ACETATE 40 MG/ML
40 INJECTION, SUSPENSION INTRA-ARTICULAR; INTRALESIONAL; INTRAMUSCULAR; SOFT TISSUE
Status: COMPLETED | OUTPATIENT
Start: 2022-09-29 | End: 2022-09-29

## 2022-09-29 RX ADMIN — KETOROLAC TROMETHAMINE 30 MG: 30 INJECTION, SOLUTION INTRAMUSCULAR; INTRAVENOUS at 10:09

## 2022-09-29 RX ADMIN — METHYLPREDNISOLONE ACETATE 40 MG: 40 INJECTION, SUSPENSION INTRA-ARTICULAR; INTRALESIONAL; INTRAMUSCULAR; SOFT TISSUE at 10:09

## 2022-09-29 NOTE — PROGRESS NOTES
Subjective:       Patient ID: Saba Hansen is a 67 y.o. male.    Chief Complaint: Follow-up    Mr. Saba Hansen is a 67 year old male, established with me, presents to the clinic for follow up with left shoulder pain.  PCP is Drew Avendano. Medical and surgical history in addition to problem list reviewed as listed below.    Patient states shoulder pain has improved with Tylenol Arthritis and Ibuprofen. Describes pain as stiffness and aching, rates pain as a 4-5/10.  Pertinent negatives include no fever, headaches, inability to bear weight, itching, joint locking, joint swelling, numbness, tingling or visual symptoms.    Chart Review:  09/14/2022 Xray of Left Shoulder Impression: Degenerative changes and possible mild calcific tendinitis of the left shoulder.  No acute bony abnormalities.      Past Medical History:   Diagnosis Date    Allergy     Asthma         Past Surgical History:   Procedure Laterality Date    AORTOPEXY W/ MLB      NASAL SINUS SURGERY      TONSILLECTOMY          Family History   Problem Relation Age of Onset    Hypertension Mother     No Known Problems Father     Allergic rhinitis Neg Hx     Allergies Neg Hx     Angioedema Neg Hx     Asthma Neg Hx     Atopy Neg Hx     Rhinitis Neg Hx     Urticaria Neg Hx     Immunodeficiency Neg Hx     Eczema Neg Hx        Social History     Tobacco Use   Smoking Status Never   Smokeless Tobacco Never       Social History     Social History Narrative    Not on file       Review of patient's allergies indicates:   Allergen Reactions    Iodinated contrast media      Hives (skin)^and itchy skin  Hives (skin)^and itchy skin          Review of Systems   Constitutional:  Negative for chills and fever.   Respiratory: Negative.     Cardiovascular: Negative.    Gastrointestinal:  Negative for nausea and vomiting.   Musculoskeletal:  Positive for arthralgias and myalgias.       Objective:        Vitals:    09/29/22 0955   BP: 114/78   Pulse: 76   Temp: 98.4 °F (36.9 °C)         Physical Exam  Constitutional:       General: He is not in acute distress.     Appearance: He is well-developed.   HENT:      Head: Normocephalic and atraumatic.      Right Ear: External ear normal.      Left Ear: External ear normal.   Eyes:      General: No scleral icterus.     Extraocular Movements: Extraocular movements intact.      Conjunctiva/sclera: Conjunctivae normal.   Cardiovascular:      Rate and Rhythm: Normal rate and regular rhythm.      Heart sounds: Normal heart sounds. No murmur heard.    No friction rub. No gallop.   Pulmonary:      Effort: Pulmonary effort is normal. No respiratory distress.      Breath sounds: Normal breath sounds. No wheezing or rales.   Musculoskeletal:         General: Tenderness (left shoulder) present.      Left shoulder: Tenderness present. Decreased range of motion.      Cervical back: Normal range of motion.   Skin:     General: Skin is warm and dry.      Findings: No erythema or rash.   Neurological:      Mental Status: He is alert and oriented to person, place, and time.      Cranial Nerves: No cranial nerve deficit.      Motor: No abnormal muscle tone.      Gait: Gait normal.   Psychiatric:         Behavior: Behavior normal.       Assessment:       1. Calcific tendinitis of left shoulder    2. Osteoarthritis of left shoulder, unspecified osteoarthritis type    3. Needs flu shot        Plan:       Calcific tendinitis of left shoulder  -     Ambulatory referral/consult to Physical/Occupational Therapy; Future; Expected date: 10/06/2022  -     ketorolac injection 30 mg  -     methylPREDNISolone acetate injection 40 mg    Osteoarthritis of left shoulder, unspecified osteoarthritis type  -     Ambulatory referral/consult to Physical/Occupational Therapy; Future; Expected date: 10/06/2022  -     ketorolac injection 30 mg  -     methylPREDNISolone acetate injection 40 mg    Needs flu shot  -     Influenza (FLUAD) - Quadrivalent (Adjuvanted) *Preferred* (65+) (PF);  Future; Expected date: 10/13/2022     Medication List with Changes/Refills   Current Medications    ADVAIR -21 MCG/ACTUATION HFAA INHALER    INHALE 2 PUFFS BY MOUTH TWICE DAILY    ALBUTEROL (PROAIR HFA) 90 MCG/ACTUATION INHALER    Inhale 2 puffs into the lungs every 6 (six) hours as needed for Wheezing. Rescue    ATORVASTATIN (LIPITOR) 40 MG TABLET    Take 1 tablet (40 mg total) by mouth once daily.    CETIRIZINE (ZYRTEC) 10 MG TABLET    Take 1 tablet (10 mg total) by mouth once daily.    COMP-AIR NEBULIZER COMPRESSOR RAFY    use as directed    FINASTERIDE (PROSCAR) 5 MG TABLET    Take 1 tablet (5 mg total) by mouth once daily.    FLUTICASONE PROPIONATE (FLONASE) 50 MCG/ACTUATION NASAL SPRAY    1 spray (50 mcg total) by Each Nostril route once daily.    IBUPROFEN (ADVIL,MOTRIN) 800 MG TABLET    Take 1 tablet (800 mg total) by mouth every 6 (six) hours as needed for Pain (L shoulder pain).    MONTELUKAST (SINGULAIR) 10 MG TABLET    Take 1 tablet (10 mg total) by mouth once daily.    TADALAFIL (CIALIS) 20 MG TAB    Take 1 tablet (20 mg total) by mouth once daily.    TAMSULOSIN (FLOMAX) 0.4 MG CAP    Take 1 capsule (0.4 mg total) by mouth once daily.            Follow up in about 2 months (around 11/29/2022) for Molly Barrios, MSN, APRN, FNP-C.    FROY Elias, MSN, FNP-C

## 2022-10-02 PROBLEM — M19.012 OSTEOARTHRITIS OF LEFT SHOULDER: Status: ACTIVE | Noted: 2022-10-02

## 2022-10-02 PROBLEM — M75.32 CALCIFIC TENDINITIS OF LEFT SHOULDER: Status: ACTIVE | Noted: 2022-10-02

## 2022-10-03 DIAGNOSIS — R97.20 ELEVATED PSA: ICD-10-CM

## 2022-10-03 DIAGNOSIS — N52.9 ERECTILE DYSFUNCTION, UNSPECIFIED ERECTILE DYSFUNCTION TYPE: Primary | ICD-10-CM

## 2022-10-24 ENCOUNTER — CLINICAL SUPPORT (OUTPATIENT)
Dept: REHABILITATION | Facility: HOSPITAL | Age: 67
End: 2022-10-24
Payer: MEDICARE

## 2022-10-24 DIAGNOSIS — M19.012 OSTEOARTHRITIS OF LEFT SHOULDER, UNSPECIFIED OSTEOARTHRITIS TYPE: ICD-10-CM

## 2022-10-24 DIAGNOSIS — M25.512 ACUTE PAIN OF LEFT SHOULDER: ICD-10-CM

## 2022-10-24 DIAGNOSIS — M75.32 CALCIFIC TENDINITIS OF LEFT SHOULDER: ICD-10-CM

## 2022-10-24 PROCEDURE — 97110 THERAPEUTIC EXERCISES: CPT | Mod: PN

## 2022-10-24 PROCEDURE — 97140 MANUAL THERAPY 1/> REGIONS: CPT | Mod: PN

## 2022-10-24 PROCEDURE — 97161 PT EVAL LOW COMPLEX 20 MIN: CPT | Mod: PN

## 2022-10-24 NOTE — PLAN OF CARE
"[facility]   Physical Therapy Initial Evaluation     Date: 10/24/2022   Name: Saba Hansen  Clinic Number: [id number]    Therapy Diagnosis:   Encounter Diagnoses   Name Primary?    Calcific tendinitis of left shoulder     Osteoarthritis of left shoulder, unspecified osteoarthritis type     Acute pain of left shoulder      Physician: Molly Barrios NP    Physician Orders: PT Eval and Treat   Medical Diagnosis from Referral:   M75.32 (ICD-10-CM) - Calcific tendinitis of left shoulder   M19.012 (ICD-10-CM) - Osteoarthritis of left shoulder, unspecified osteoarthritis type     Evaluation Date: 10/24/2022  Authorization Period Expiration: 9/29/2023  Plan of Care Expiration: 12/5/2022   Progress Note Due: 11/24/2022  Visit # / Visits authorized: 1/ 1     Eval Visit FOTO - 45 (10/24/2022)   5th Visit FOTO  - (Date/Score/Turn Green)   10th Visit FOTO - (Date/Score/Turn Green)   D/C FOTO -  (Date/Score/Turn Green)      Precautions: Standard     Time In: 9:30 am   Time Out: 10:15 am  Total Appointment Time (timed & untimed codes): 45 minutes    SUBJECTIVE     Date of onset: 8/24/2022     Current Condition - Saba reports having pain for the last 2 months. He woke up one morning after sleeping on his Left shoulder and the pain began. Gradual onset of discomfort is noted with no Mode of Injury present. His pain occurs with overhead activities.     Type of Pain: Pain is located to the anterior aspect of the left shoulder and described as Dull and Deep  24-Hour Pattern: All day discomfort.   Sleep: Pt is able to sleep for 2-3 hours before pain wakes them up.    Aggravating Factors: Lifting and Sleeping on the Left shoulder  Easing Factors: rest    Imaging: X-ray: Per imaging report, "No fracture, dislocation or bone destruction.  Moderate glenohumeral arthritis.  There are degenerative changes of the acromioclavicular joint.  Subtle linear calcification adjacent to the greater tuberosity may " "represent mild calcific tendinitis.  Otherwise, surrounding soft tissue structures show no significant abnormalities."    Prior Therapy: None.   Work/School: Retired.   Hand Dominance: right    Prior Level of Function: Independent.   Current Level of Function: Independent.     Pain:  Current 7/10,   Worst 9/10,   Best 4/10     Non-Mechanical Origin:  Night Pain?  None.   Hx of Cancer?  None.   Trauma?  None.     Falls: No falls in the last year.   Red Flags: (-) Numbness/Tingling      Patient Goals: Patient would like to decrease pain and increase mobility.      Medical History:   Past Medical History:   Diagnosis Date    Allergy     Asthma      Surgical History:   Saba Hansen  has a past surgical history that includes Nasal sinus surgery; Aortopexy w/ MLB; and Tonsillectomy.    Medications:   Saba has a current medication list which includes the following prescription(s): advair hfa, albuterol, atorvastatin, cetirizine, comp-air nebulizer compressor, finasteride, fluticasone propionate, ibuprofen, montelukast, tadalafil, and tamsulosin, and the following Facility-Administered Medications: lidocaine hcl 2%.    Allergies:   Review of patient's allergies indicates:   Allergen Reactions    Iodinated contrast media      Hives (skin)^and itchy skin  Hives (skin)^and itchy skin          OBJECTIVE   OBSERVATIONS: Patient is a 67 year old female who ambulates with no AD and no apparent signs of distress. No signs of atrophy at the SCM, traps, deltoids, supraspinatus.    POSTURE:  Patient is significant for moderate forward head and internally rotated shoulders. Upper t-spine flexion also observed.    Scapular Posture [place]   Anterior tipping YES     SHOULDER ACTIVE RANGE OF MOTION    Left Right    Flexion 85° 130°   Abduction 90° 125°   External Rotation 40° 35°   Internal Rotation (Apley Scratch Test) PSIS L4   Extension 50° 50°   SHOULDER PASSIVE RANGE OF MOTION    Flexion 110° 140°   Abduction 100° 125°   External " Rotation  10° 50°   Internal Rotation (Apley Scratch Test) 50° 40°     UPPER LOWER EXTREMITY STRENGTH    Left  Right    Shoulder Flexion 4-/5 4+/5   Shoulder Abduction 4/5 5/5   Shoulder External Rotation 4/5 4/5   Shoulder Internal Rotation 4-/5 4+/5   Elbow Flexion 4+/5 5/5   Elbow Extension 4+/5 5/5       SPECIAL TESTS   Impingement Cluster   Ortiz-Rodrigo Positive    Painful Arc Sign Positive    Infraspinatus MMT Painful, Unable to sustain   Additional Impingement   NEER Positive    Coracoid Impingement Positive    Cross-Arm Adduction Positive    Anterior Instability Cluster    Anterior Apprehension Test Negative    Relocation Test Negative      PALPATION:  Patient reports primary pain region along     Shoulder Capsule Mobility (Left Shoulder)   Anterior - Normal  Posterior - Hypomobile  Inferior - Hypomobile    Limitation/Restriction for FOTO DASH Survey    Therapist reviewed FOTO scores for Saba Hansen on 10/24/2022.   FOTO documents entered into [employer] - see Media section.    Limitation Score: 54%       TREATMENT     Total Treatment time (time-based codes) separate from Evaluation: 25 minutes      Saba received the treatments listed below:      therapeutic exercises to develop strength, endurance, ROM, and flexibility for 15 minutes including:  Isometric Set 2x10, with a 3' sec hold (Flexion/Abduction/Internal Rotation/ External Rotation)  Shoulder Flexion AAROM on Table 3x10     manual therapy techniques: Joint mobilizations, Manual traction, Myofacial release, and Soft tissue Mobilization were applied to the: left shoulder for 10 minutes, including:  Soft-Tissue Mobilization to the infraspinatus muscle   Passive range of motion of the left shoulder in all planes   Mobilization of the Left shoulder, Grade 2, Distraction/Inferior/Posterior       PATIENT EDUCATION AND HOME EXERCISES     Education provided:   - Pt was educated on the prognosis and pathology of osteoarthritis to the Left shoulder. The  patient verbalized understanding and compliance with HEP.    - Pt/family was provided educational information, including: role of PT, goals for PT, scheduling - pt verbalized understanding. Discussed insurance limitations with pt.     Written Home Exercises Provided: yes. Exercises were reviewed and Saba was able to demonstrate them prior to the end of the session.  Saba demonstrated good  understanding of the education provided. See EMR under Patient Instructions for exercises provided during therapy sessions.    ASSESSMENT     Saba is a 67 y.o. male referred to outpatient Physical Therapy with a medical diagnosis of Calcific tendinitis of left shoulder. Findings are consistent with osteoarthritis to the Left shoulder. Limitations are noted with overhead activities, sleeping, lifting > 10 lbs, and donning on clothes without pain. Primary impairment is pain, secondary to decreased ROM, endurance, strength, coordination, stability/balance, muscular activation, and function to the left shoulder. Pt. would benefit from PT to address impairments listed above to return to functional independence.    Intervention strategies designed to address these impairments will be instrumental in achieving the stated functional goals, including her ability to safely perform mobility tasks. Based on the examination, the patient's rehabilitation potential to achieve functional goals is good due to age and motivational factors.     Patient prognosis is Good.   Patient will benefit from skilled outpatient Physical Therapy to address the deficits stated above and in the chart below, provide patient /family education, and to maximize patientt's level of independence.     Plan of care discussed with patient: Yes  Patient's spiritual, cultural and educational needs considered and patient is agreeable to the plan of care and goals as stated below:     Anticipated Barriers for therapy: None.     Medical Necessity is demonstrated by the  following  History  Co-morbidities and personal factors that may impact the plan of care Co-morbidities:   None    Personal Factors:   no deficits     low   Examination  Body Structures and Functions, activity limitations and participation restrictions that may impact the plan of care Body Regions:   upper extremities    Body Systems:    gross symmetry  ROM  strength  gross coordinated movement  transitions  motor control    Participation Restrictions:   None.     Activity limitations:   Learning and applying knowledge  no deficits    General Tasks and Commands  no deficits    Communication  no deficits    Mobility  no deficits    Self care  no deficits    Domestic Life  no deficits    Interactions/Relationships  no deficits    Life Areas  no deficits    Community and Social Life  no deficits         low   Clinical Presentation stable and uncomplicated low   Decision Making/ Complexity Score: low     Goals:  Short Term Goals: 3 weeks   Patient will be independent with HEP at home to increase PT compliance.     Patient will be able to lift arm into 130° of flexion/abduction and 50° of Internal Rotation/External Rotation to increase mobility of the shoulder    Patient will be able to sleep for > 5 hours without waking up due to pain to increase sleep capacity.           Long Term Goals: 6 weeks   Patient will decrease DASH score to < 15% to increase functional capacity.     Patient will be able to lift 10lbs 10 times to at least 90° of flexion to increase overhead activities with < 2/10 pain.     Patient will be able to fish with Left shoulder with < 2/10 pain to increase recreational capacity.           PLAN   Plan of care Certification: 10/24/2022 to 12/5/2022 .    Outpatient Physical Therapy 2 times weekly for 6 weeks to include the following interventions: Electrical Stimulation (IFC), Manual Therapy, Moist Heat/ Ice, Neuromuscular Re-ed, Patient Education, Self Care, Therapeutic Activities, Therapeutic Exercise,  and Functional Dry Needling.     Israel Zhu, PT, DPT    Pt may be seen by PTA as part of the rehabilitation team.     Therapist: Israel Zhu, PT, DPT 10/24/2022    I CERTIFY THE NEED FOR THESE SERVICES FURNISHED UNDER THIS PLAN OF TREATMENT AND WHILE UNDER MY CARE   Physician's comments:     Physician's Signature: ___________________________________________________

## 2022-10-24 NOTE — PROGRESS NOTES
[facility]   Physical Therapy Initial Evaluation     Date: 10/24/2022   Name: Saba Hansen  Clinic Number: [id number]    Therapy Diagnosis:   Encounter Diagnoses   Name Primary?    Calcific tendinitis of left shoulder     Osteoarthritis of left shoulder, unspecified osteoarthritis type     Acute pain of left shoulder      Physician: Molly Barrios NP    Physician Orders: PT Eval and Treat   Medical Diagnosis from Referral:   M75.32 (ICD-10-CM) - Calcific tendinitis of left shoulder   M19.012 (ICD-10-CM) - Osteoarthritis of left shoulder, unspecified osteoarthritis type     Evaluation Date: 10/24/2022  Authorization Period Expiration: 9/29/2023  Plan of Care Expiration: 12/5/2022   Progress Note Due: 11/24/2022  Visit # / Visits authorized: 1/ 1     Eval Visit FOTO - 45 (10/24/2022)   5th Visit FOTO  - (Date/Score/Turn Green)   10th Visit FOTO - (Date/Score/Turn Green)   D/C FOTO -  (Date/Score/Turn Green)      Precautions: Standard     Time In: 9:30 am   Time Out: 10:15 am  Total Appointment Time (timed & untimed codes): 45 minutes      Please see Plan of Care notation section to view Saba Hansen Initial Evaluation.       Israel Zhu, PT,DPT

## 2022-10-24 NOTE — PATIENT INSTRUCTIONS
Access Code: [email or web address]  URL: [email or web address]  Date: 10/24/2022  Prepared by: Israel Zhu

## 2022-11-08 ENCOUNTER — CLINICAL SUPPORT (OUTPATIENT)
Dept: REHABILITATION | Facility: HOSPITAL | Age: 67
End: 2022-11-08
Payer: MEDICARE

## 2022-11-08 DIAGNOSIS — M25.512 ACUTE PAIN OF LEFT SHOULDER: ICD-10-CM

## 2022-11-08 DIAGNOSIS — M75.32 CALCIFIC TENDINITIS OF LEFT SHOULDER: ICD-10-CM

## 2022-11-08 DIAGNOSIS — M19.012 OSTEOARTHRITIS OF LEFT SHOULDER, UNSPECIFIED OSTEOARTHRITIS TYPE: Primary | ICD-10-CM

## 2022-11-08 PROCEDURE — 97112 NEUROMUSCULAR REEDUCATION: CPT | Mod: PN

## 2022-11-08 PROCEDURE — 97140 MANUAL THERAPY 1/> REGIONS: CPT | Mod: PN

## 2022-11-08 PROCEDURE — 97110 THERAPEUTIC EXERCISES: CPT | Mod: PN

## 2022-11-08 NOTE — PROGRESS NOTES
Physical Therapy Daily Treatment Note     Name: Saba Hansen  Clinic Number: [id number]    Therapy Diagnosis:   Encounter Diagnoses   Name Primary?    Osteoarthritis of left shoulder, unspecified osteoarthritis type Yes    Calcific tendinitis of left shoulder     Acute pain of left shoulder      Physician: Molly Barrios NP    Visit Date: 2022    Evaluation Date: 10/24/2022  Authorization Period Expiration: 2022  Plan of Care Expiration: 2022   Progress Note Due: 2022  Visit # / Visits authorized:    Remaining Visits Scheduled - 08  PTA Consecutive Visits - 0/6     Eval Visit FOTO - 45 (10/24/2022)   5th Visit FOTO  - (Date/Score/Turn Green)   10th Visit FOTO - (Date/Score/Turn Green)   D/C FOTO -  (Date/Score/Turn Green)      Time In: 10:15  Time Out: 11:00  Billable Time: 45 minutes  Non-Billable Time: 00 minutes    Precautions: Standard  Insurance: Payor: [employer] [place] MEDICARE / Plan: [employer] (SPECIAL NEEDS PLAN) / Product Type: Medicare Advantage /     Subjective     Pt reports: improvement of discomfort since the initial evaluation. He still has some discomfort on the left shoulder that does wake him up.   He is compliant with home exercise program.  Response to previous treatment: 1st treatment session     Pre-Treatment Pain Ratin/10  Post-Treatment Pain Ratin/10  Location: left shoulder      Objective     Saba received therapeutic exercises to develop strength, endurance, ROM, flexibility, and posture for 15 minutes including:    Warm-up      Supine    Dowel Flexion/External Rotation 2x10    Seated        Standing    Shoulder Flexion AAROM  on Table 3x10   Dowel Abduction/Internal Rotation 2x10   Pec Stretch 2x30 sec       *Bold exercise performed       Saba received the following manual therapy techniques: Joint mobilizations, Manual traction, Manual Lymphatic Drainage, and Soft tissue Mobilization were applied to the: left shoulder for 15 minutes,  including:    Visit Number:  1 out of 12   Manual Therapy  (amplitude and time)     Soft-Tissue Mobilization to the infraspinatus muscle  Not Performed     Passive range of motion of the left shoulder in all planes  Done   Mobilization of the Left shoulder, Grade 2, Distraction/Inferior/Posterior Done                Saba participated in neuromuscular re-education activities to improve: Coordination, Kinesthetic, Proprioception, Neuromuscular Control and Posture for 15 minutes. The following activities were included:  Isometric Set 2x10, with a 3' sec hold (Flexion/Abduction/Internal Rotation/ External Rotation)    Saba participated in dynamic functional therapeutic activities to improve functional performance for 00  minutes, including:    Visit Number:  1 out of 12   Activities performed   (duration/resistance)                               Home Exercises Provided and Patient Education Provided     Education provided:   - Continue with HEP    Written Home Exercises Provided: Patient instructed to cont prior HEP.  Exercises were reviewed and Saba was able to demonstrate them prior to the end of the session.  Saba demonstrated good  understanding of the education provided.     See EMR under Patient Instructions for exercises provided prior visit.    Assessment     Patient completed the exercise program with decreased pain in the left shoulder. Soft-Tissue Mobilization and Mobilizations to the shoulder helped reduce the pain and increase mobility. This was then followed up assisted active range of motion movements to increase mobility. Improved mobility to ~110 of flexion/abduction and ~40 of External Rotation/Internal Rotation. Once the new range of motion was observed, the patient continued with stability exercises to help maintain new mobility. Fatigability was present in the left shoulder after today's session, but no pain increased. Patient would continue to benefit from skilled Physical Therapy to  increase range of motion and stability.     Saba Is progressing well towards His goals.     Pt prognosis is Good.     Pt will continue to benefit from skilled outpatient physical therapy to address the deficits listed in the problem list box on initial evaluation, provide pt/family education and to maximize pt's level of independence in the home and community environment.     Pt's spiritual, cultural and educational needs considered and pt agreeable to plan of care and goals.    Anticipated barriers to physical therapy: None.     Goals:  Short Term Goals: 3 weeks   Patient will be independent with HEP at home to increase PT compliance.  Progressing 11/8/2022     Patient will be able to lift arm into 130° of flexion/abduction and 50° of Internal Rotation/External Rotation to increase mobility of the shoulder Progressing 11/8/2022     Patient will be able to sleep for > 5 hours without waking up due to pain to increase sleep capacity.  Progressing 11/8/2022            Long Term Goals: 6 weeks   Patient will decrease DASH score to < 15% to increase functional capacity.  Progressing 11/8/2022    Patient will be able to lift 10lbs 10 times to at least 90° of flexion to increase overhead activities with < 2/10 pain.  Progressing 11/8/2022    Patient will be able to fish with Left shoulder with < 2/10 pain to increase recreational capacity.  Progressing 11/8/2022              Plan     Progress duration, workload, and resistance levels of the Therapeutic Activities and Exercises if the patient does not exhibit any pain, swelling, or other symptoms. Progress instruction in-home exercise progression and modification, including symptom management.    Israel Zhu, PT ,DPT  11/8/2022

## 2022-11-15 ENCOUNTER — CLINICAL SUPPORT (OUTPATIENT)
Dept: REHABILITATION | Facility: HOSPITAL | Age: 67
End: 2022-11-15
Payer: MEDICARE

## 2022-11-15 DIAGNOSIS — M19.012 OSTEOARTHRITIS OF LEFT SHOULDER, UNSPECIFIED OSTEOARTHRITIS TYPE: Primary | ICD-10-CM

## 2022-11-15 DIAGNOSIS — M75.32 CALCIFIC TENDINITIS OF LEFT SHOULDER: ICD-10-CM

## 2022-11-15 DIAGNOSIS — M25.512 ACUTE PAIN OF LEFT SHOULDER: ICD-10-CM

## 2022-11-15 PROCEDURE — 97110 THERAPEUTIC EXERCISES: CPT | Mod: PN

## 2022-11-15 PROCEDURE — 97140 MANUAL THERAPY 1/> REGIONS: CPT | Mod: PN

## 2022-11-15 PROCEDURE — 97112 NEUROMUSCULAR REEDUCATION: CPT | Mod: PN

## 2022-11-15 NOTE — PROGRESS NOTES
Physical Therapy Daily Treatment Note     Name: Saba Hansen  Clinic Number: [id number]    Therapy Diagnosis:   Encounter Diagnoses   Name Primary?    Osteoarthritis of left shoulder, unspecified osteoarthritis type Yes    Calcific tendinitis of left shoulder     Acute pain of left shoulder        Physician: Molly Barrios NP    Visit Date: 11/15/2022    Evaluation Date: 10/24/2022  Authorization Period Expiration: 2022  Plan of Care Expiration: 2022   Progress Note Due: 2022  Visit # / Visits authorized:    Remaining Visits Scheduled -   PTA Consecutive Visits - 0/6     Eval Visit FOTO - 45 (10/24/2022)   5th Visit FOTO  - (Date/Score/Turn Green)   10th Visit FOTO - (Date/Score/Turn Green)   D/C FOTO -  (Date/Score/Turn Green)      Time In: 9:15  Time Out: 10:10  Billable Time: 55 minutes  Non-Billable Time: 00 minutes    Precautions: Standard  Insurance: Payor: [employer] [place] MEDICARE / Plan: [employer] (SPECIAL NEEDS PLAN) / Product Type: Medicare Advantage /     Subjective     Pt reports: improvement with mobility and a decrease in pain.   He is compliant with home exercise program.  Response to previous treatment: Good progress from last session.     Pre-Treatment Pain Ratin/10  Post-Treatment Pain Ratin/10  Location: left shoulder      Objective     Saba received therapeutic exercises to develop strength, endurance, ROM, flexibility, and posture for 30 minutes including:    Warm-up      Supine    Dowel Flexion/External Rotation 2x10  Shoulder Flexion/Abduction 2x8, Yellow Theraband     Seated        Standing    Shoulder Flexion AAROM  on Table 3x10   Dowel Abduction 2x10   Towel Internal Rotation Stretch 2x10  Pec Stretch 2x30 sec   Rows 3x10, Red Theraband   Pull Downs 3x10, Red Theraband       *Bold exercise performed       Saba received the following manual therapy techniques: Joint mobilizations, Manual traction, Manual Lymphatic Drainage, and Soft tissue  Mobilization were applied to the: left shoulder for 10 minutes, including:    Visit Number:  1 out of 12   Manual Therapy  (amplitude and time)     Soft-Tissue Mobilization to the infraspinatus muscle  Not Performed     Passive range of motion of the left shoulder in all planes  Done   Mobilization of the Left shoulder, Grade 2, Distraction/Inferior/Posterior Done                Saba participated in neuromuscular re-education activities to improve: Coordination, Kinesthetic, Proprioception, Neuromuscular Control and Posture for 20 minutes. The following activities were included:  Isometric Set 2x10, with a 3' sec hold (Flexion/Abduction/Internal Rotation/ External Rotation)  Rhythm Stabilization on Wall 2x15 (Counter-Clockwise/Clockwise/Sideways/Up-Down)     Saba participated in dynamic functional therapeutic activities to improve functional performance for 00  minutes, including:    Visit Number:  1 out of 12   Activities performed   (duration/resistance)                               Home Exercises Provided and Patient Education Provided     Education provided:   - Continue with HEP    Written Home Exercises Provided: Patient instructed to cont prior HEP.  Exercises were reviewed and Saba was able to demonstrate them prior to the end of the session.  Saba demonstrated good  understanding of the education provided.     See EMR under Patient Instructions for exercises provided prior visit.    Assessment     Patient completed the exercise program with decreased pain in the left shoulder. Limitations in shoulder mobility are still present but improving with less discomfort. New exercises were well tolerated, with moderate cues needed during the rows due to improper scapular retraction. Fatigability was present with the shoulder isometrics and rhythm stabilization exercises. No modifications were required. Patient would continue to benefit from skilled Physical Therapy to decrease discomfort and increase the  mobility of the left shoulder.     Saba Is progressing well towards His goals.     Pt prognosis is Good.     Pt will continue to benefit from skilled outpatient physical therapy to address the deficits listed in the problem list box on initial evaluation, provide pt/family education and to maximize pt's level of independence in the home and community environment.     Pt's spiritual, cultural and educational needs considered and pt agreeable to plan of care and goals.    Anticipated barriers to physical therapy: None.     Goals:  Short Term Goals: 3 weeks   Patient will be independent with HEP at home to increase PT compliance.  Progressing 11/15/2022     Patient will be able to lift arm into 130° of flexion/abduction and 50° of Internal Rotation/External Rotation to increase mobility of the shoulder Progressing 11/15/2022     Patient will be able to sleep for > 5 hours without waking up due to pain to increase sleep capacity.  Progressing 11/15/2022            Long Term Goals: 6 weeks   Patient will decrease DASH score to < 15% to increase functional capacity.  Progressing 11/15/2022    Patient will be able to lift 10lbs 10 times to at least 90° of flexion to increase overhead activities with < 2/10 pain.  Progressing 11/15/2022    Patient will be able to fish with Left shoulder with < 2/10 pain to increase recreational capacity.  Progressing 11/15/2022              Plan     Progress duration, workload, and resistance levels of the Therapeutic Activities and Exercises if the patient does not exhibit any pain, swelling, or other symptoms. Progress instruction in-home exercise progression and modification, including symptom management.    Israel Zhu, PT ,DPT  11/15/2022

## 2022-11-17 ENCOUNTER — CLINICAL SUPPORT (OUTPATIENT)
Dept: REHABILITATION | Facility: HOSPITAL | Age: 67
End: 2022-11-17
Payer: MEDICARE

## 2022-11-17 DIAGNOSIS — M25.512 ACUTE PAIN OF LEFT SHOULDER: ICD-10-CM

## 2022-11-17 DIAGNOSIS — M75.32 CALCIFIC TENDINITIS OF LEFT SHOULDER: ICD-10-CM

## 2022-11-17 DIAGNOSIS — M19.012 OSTEOARTHRITIS OF LEFT SHOULDER, UNSPECIFIED OSTEOARTHRITIS TYPE: Primary | ICD-10-CM

## 2022-11-17 PROCEDURE — 97140 MANUAL THERAPY 1/> REGIONS: CPT | Mod: PN

## 2022-11-17 PROCEDURE — 97110 THERAPEUTIC EXERCISES: CPT | Mod: PN

## 2022-11-17 NOTE — PROGRESS NOTES
Physical Therapy Daily Treatment Note     Name: Saba Hansen  Clinic Number: [id number]    Therapy Diagnosis:   Encounter Diagnoses   Name Primary?    Osteoarthritis of left shoulder, unspecified osteoarthritis type Yes    Calcific tendinitis of left shoulder     Acute pain of left shoulder        Physician: Molly Barrios NP    Visit Date: 2022    Evaluation Date: 10/24/2022  Authorization Period Expiration: 2022  Plan of Care Expiration: 2022   Progress Note Due: 2022  Visit # / Visits authorized: 3/ 12   Remaining Visits Scheduled -   PTA Consecutive Visits - 0/6     Eval Visit FOTO - 45 (10/24/2022)   5th Visit FOTO  - (Date/Score/Turn Green)   10th Visit FOTO - (Date/Score/Turn Green)   D/C FOTO -  (Date/Score/Turn Green)      Time In: 08  Time Out: 920  Billable Time: 45 minutes  Non-Billable Time: 00 minutes    Precautions: Standard  Insurance: Payor: [employer] MEDICARE / Plan: [employer] (SPECIAL NEEDS PLAN) / Product Type: Medicare Advantage /     Subjective     Pt reports: continued - improvement with mobility and a decrease in pain.   He is compliant with home exercise program.  Response to previous treatment: Good progress from last session.     Pre-Treatment Pain Ratin/10  Post-Treatment Pain Ratin/10  Location: left shoulder      Objective     Saba received therapeutic exercises to develop strength, endurance, ROM, flexibility, and posture for 35 minutes including:    Warm-up      Supine    Dowel Flexion/External Rotation 2x10  Shoulder Flexion/Abduction 2x8, Yellow Theraband   Side lying external rotation  and internal rotation stretch - 3' each     Seated        Standing    Shoulder Flexion AAROM  on Table 3x10   Dowel Abduction 2x10   Towel Internal Rotation Stretch 2x10  Pec Stretch(corner stretch) 2x30 sec   Rows 3x10, Red Theraband   Pull Downs 3x10, Red Theraband       *Bold exercise performed       Saba received the following manual therapy  techniques: Joint mobilizations, Manual traction, Manual Lymphatic Drainage, and Soft tissue Mobilization were applied to the: left shoulder for 10 minutes, including:    Visit Number:  3 out of 12   Manual Therapy  (amplitude and time)     Soft-Tissue Mobilization to the infraspinatus muscle  Not Performed     Passive range of motion of the left shoulder in all planes  Done   Mobilization of the Left shoulder, Grade 2, Distraction/Inferior/Posterior Done                Saba participated in neuromuscular re-education activities to improve: Coordination, Kinesthetic, Proprioception, Neuromuscular Control and Posture for 00 minutes. The following activities were included:  Isometric Set 2x10, with a 3' sec hold (Flexion/Abduction/Internal Rotation/ External Rotation)  Rhythm Stabilization on Wall 2x15 (Counter-Clockwise/Clockwise/Sideways/Up-Down)     Juniororol participated in dynamic functional therapeutic activities to improve functional performance for 00  minutes, including:    Visit Number:  1 out of 12   Activities performed   (duration/resistance)                               Home Exercises Provided and Patient Education Provided     Education provided:   - Continue with HEP    Written Home Exercises Provided: Patient instructed to cont prior HEP.  Exercises were reviewed and Saba was able to demonstrate them prior to the end of the session.  Saba demonstrated good  understanding of the education provided.     See EMR under Patient Instructions for exercises provided prior visit.    Assessment     Patient currently presents to therapy with reports/displays of crepitus left shoulder movement when performing movement. Patient lacks external rotation  and internal rotation with functional movement. Patient displayed improved shoulder flexion and external rotation  tolerance with a decrease in pain following manual therapy. Patient issued additional home exercise program  to improve shoulder mobility. Patient  will continue to benefit from     Jeorol Is progressing well towards His goals.     Pt prognosis is Good.     Pt will continue to benefit from skilled outpatient physical therapy to address the deficits listed in the problem list box on initial evaluation, provide pt/family education and to maximize pt's level of independence in the home and community environment.     Pt's spiritual, cultural and educational needs considered and pt agreeable to plan of care and goals.    Anticipated barriers to physical therapy: None.     Goals:  Short Term Goals: 3 weeks   Patient will be independent with HEP at home to increase PT compliance.  Progressing 11/17/2022     Patient will be able to lift arm into 130° of flexion/abduction and 50° of Internal Rotation/External Rotation to increase mobility of the shoulder Progressing 11/17/2022     Patient will be able to sleep for > 5 hours without waking up due to pain to increase sleep capacity.  Progressing 11/17/2022            Long Term Goals: 6 weeks   Patient will decrease DASH score to < 15% to increase functional capacity.  Progressing 11/17/2022    Patient will be able to lift 10lbs 10 times to at least 90° of flexion to increase overhead activities with < 2/10 pain.  Progressing 11/17/2022    Patient will be able to fish with Left shoulder with < 2/10 pain to increase recreational capacity.  Progressing 11/17/2022              Plan     Progress duration, workload, and resistance levels of the Therapeutic Activities and Exercises if the patient does not exhibit any pain, swelling, or other symptoms. Progress instruction in-home exercise progression and modification, including symptom management.    Flavio Yadav, PT ,DPT  11/17/2022

## 2022-11-21 ENCOUNTER — CLINICAL SUPPORT (OUTPATIENT)
Dept: REHABILITATION | Facility: HOSPITAL | Age: 67
End: 2022-11-21
Payer: MEDICARE

## 2022-11-21 DIAGNOSIS — M75.32 CALCIFIC TENDINITIS OF LEFT SHOULDER: ICD-10-CM

## 2022-11-21 DIAGNOSIS — M25.512 ACUTE PAIN OF LEFT SHOULDER: ICD-10-CM

## 2022-11-21 DIAGNOSIS — M19.012 OSTEOARTHRITIS OF LEFT SHOULDER, UNSPECIFIED OSTEOARTHRITIS TYPE: Primary | ICD-10-CM

## 2022-11-21 PROCEDURE — 97112 NEUROMUSCULAR REEDUCATION: CPT | Mod: PN,CQ

## 2022-11-21 PROCEDURE — 97140 MANUAL THERAPY 1/> REGIONS: CPT | Mod: PN,CQ

## 2022-11-21 PROCEDURE — 97110 THERAPEUTIC EXERCISES: CPT | Mod: PN,CQ

## 2022-11-21 NOTE — PROGRESS NOTES
"    Physical Therapy Daily Treatment Note     Name: Saba Hansen  Clinic Number: [id number]    Therapy Diagnosis:   Encounter Diagnoses   Name Primary?    Osteoarthritis of left shoulder, unspecified osteoarthritis type Yes    Calcific tendinitis of left shoulder     Acute pain of left shoulder        Physician: Molly Barrios NP    Visit Date: 2022    Evaluation Date: 10/24/2022  Authorization Period Expiration: 2022  Plan of Care Expiration: 2022   Progress Note Due: 2022  Visit # / Visits authorized:    Remaining Visits Scheduled -   PTA Consecutive Visits -      Eval Visit FOTO - 45 (10/24/2022)   5th Visit FOTO  - (Date/Score/Turn Green)   10th Visit FOTO - (Date/Score/Turn Green)   D/C FOTO -  (Date/Score/Turn Green)      Time In: 9:32  Time Out: 10:15  Billable Time: 43 minutes  Non-Billable Time: 00 minutes    Precautions: Standard  Insurance: Payor: [employer] MANAGED MEDICARE / Plan: [employer] (SPECIAL NEEDS PLAN) / Product Type: Medicare Advantage /     Subjective     Pt reports: "more flexibility, decreased pain. Some stiffness this morning because of the weather  He is compliant with home exercise program.  Response to previous treatment: Good    Pre-Treatment Pain Ratin/10  Post-Treatment Pain Ratin/10  Location: left shoulder      Objective     Saba received therapeutic exercises to develop strength, endurance, ROM, flexibility, and posture for 23 minutes including:    Warm-up      Supine    Dowel Flexion/External Rotation 2x10  Shoulder Flexion/Abduction 2x8, Yellow Theraband   Side lying external rotation  and internal rotation stretch - 3' each     Seated        Standing    Shoulder Flexion AAROM  on Table 3x10   Dowel Abduction 2x10   Towel Internal Rotation Stretch 2x10  Pec Stretch(corner stretch) 2x30 sec   Rows 3x10, Red Theraband   Pull Downs 3x10, Red Theraband   Wall slides 2x10    *Bold exercise performed       Saba received the following manual " therapy techniques: Joint mobilizations, Manual traction, Manual Lymphatic Drainage, and Soft tissue Mobilization were applied to the: left shoulder for 10 minutes, including:    Visit Number:  3 out of 12   Manual Therapy  (amplitude and time)     Soft-Tissue Mobilization to the infraspinatus muscle  Not Performed     Passive range of motion of the left shoulder in all planes  Done   Mobilization of the Left shoulder, Grade 2, Distraction/Inferior/Posterior Done                Saba participated in neuromuscular re-education activities to improve: Coordination, Kinesthetic, Proprioception, Neuromuscular Control and Posture for 10 minutes. The following activities were included:  Isometric Set 2x10, with a 3' sec hold (Flexion/Abduction/Internal Rotation/ External Rotation)  Rhythm Stabilization on Wall 2x15 (Counter-Clockwise/Clockwise/Sideways/Up-Down)     Saba participated in dynamic functional therapeutic activities to improve functional performance for 00  minutes, including:    Visit Number:  1 out of 12   Activities performed   (duration/resistance)                               Home Exercises Provided and Patient Education Provided     Education provided:   - Continue with HEP    Written Home Exercises Provided: Patient instructed to cont prior HEP.  Exercises were reviewed and Saba was able to demonstrate them prior to the end of the session.  Saba demonstrated good  understanding of the education provided.     See EMR under Patient Instructions for exercises provided prior visit.    Assessment     Pt tolerated session well. During manual therapy he was pleased with improved PROM. Wall slides added to strengthen shoulder and increase AAROM. Pt experienced some fatigue and ROM is still limited but had no adverse effects with the exercise. Rhythmic stabilization caused fatigue in the L shoulder, pt completed all reps with rest breaks. Juniororol had no adverse effects with ther ex and manual therapy today  and will continue to benefit from skilled therapy.    Saba Is progressing well towards His goals.     Pt prognosis is Good.     Pt will continue to benefit from skilled outpatient physical therapy to address the deficits listed in the problem list box on initial evaluation, provide pt/family education and to maximize pt's level of independence in the home and community environment.     Pt's spiritual, cultural and educational needs considered and pt agreeable to plan of care and goals.    Anticipated barriers to physical therapy: None.     Goals:  Short Term Goals: 3 weeks   Patient will be independent with HEP at home to increase PT compliance.  Progressing 11/21/2022     Patient will be able to lift arm into 130° of flexion/abduction and 50° of Internal Rotation/External Rotation to increase mobility of the shoulder Progressing 11/21/2022     Patient will be able to sleep for > 5 hours without waking up due to pain to increase sleep capacity.  Progressing 11/21/2022            Long Term Goals: 6 weeks   Patient will decrease DASH score to < 15% to increase functional capacity.  Progressing 11/21/2022    Patient will be able to lift 10lbs 10 times to at least 90° of flexion to increase overhead activities with < 2/10 pain.  Progressing 11/21/2022    Patient will be able to fish with Left shoulder with < 2/10 pain to increase recreational capacity.  Progressing 11/21/2022              Plan     Progress duration, workload, and resistance levels of the Therapeutic Activities and Exercises if the patient does not exhibit any pain, swelling, or other symptoms. Progress instruction in-home exercise progression and modification, including symptom management.    Lexy Kebede, PTA   11/21/2022

## 2022-11-23 ENCOUNTER — CLINICAL SUPPORT (OUTPATIENT)
Dept: REHABILITATION | Facility: HOSPITAL | Age: 67
End: 2022-11-23
Payer: MEDICARE

## 2022-11-23 DIAGNOSIS — M75.32 CALCIFIC TENDINITIS OF LEFT SHOULDER: ICD-10-CM

## 2022-11-23 DIAGNOSIS — M19.012 OSTEOARTHRITIS OF LEFT SHOULDER, UNSPECIFIED OSTEOARTHRITIS TYPE: Primary | ICD-10-CM

## 2022-11-23 DIAGNOSIS — M25.512 ACUTE PAIN OF LEFT SHOULDER: ICD-10-CM

## 2022-11-23 PROCEDURE — 97140 MANUAL THERAPY 1/> REGIONS: CPT | Mod: PN

## 2022-11-23 PROCEDURE — 97112 NEUROMUSCULAR REEDUCATION: CPT | Mod: PN

## 2022-11-23 PROCEDURE — 97110 THERAPEUTIC EXERCISES: CPT | Mod: PN

## 2022-11-23 NOTE — PROGRESS NOTES
Physical Therapy Daily Treatment Note     Name: Saba Hansen  Clinic Number: [id number]    Therapy Diagnosis:   Encounter Diagnoses   Name Primary?    Osteoarthritis of left shoulder, unspecified osteoarthritis type Yes    Calcific tendinitis of left shoulder     Acute pain of left shoulder        Physician: Molly Barrios NP    Visit Date: 2022    Evaluation Date: 10/24/2022  Authorization Period Expiration: 2022  Plan of Care Expiration: , 2022  Progress Note Due: 2022  Visit # / Visits authorized:    Remaining Visits Scheduled - 06  PTA Consecutive Visits - 0/6     Eval Visit FOTO - 45 (10/24/2022)   5th Visit FOTO  - 56 (2022)   10th Visit FOTO - (Date/Score/Turn Green)   D/C FOTO -  (Date/Score/Turn Green)      Time In: 9:30  Time Out: 10:15  Billable Time: 45 minutes  Non-Billable Time: 00 minutes    Precautions: Standard  Insurance: Payor: [employer] MANAGED MEDICARE / Plan: [employer] (SPECIAL NEEDS PLAN) / Product Type: Medicare Advantage /     Subjective     Pt. reports 80% improvement since the beginning of PT.  Current pain levels to the left shoulder are 4/10  Limiting factors include:  Horizontal Abduction   Lifting > 5 lbs overhead      Improvements factors include:   Range of motion   Activities of daily living       He is compliant with home exercise program.  Response to previous treatment: Good    Pre-Treatment Pain Ratin/10  Post-Treatment Pain Ratin/10  Location: left shoulder      Objective     SHOULDER ACTIVE RANGE OF MOTION     Left Right    Flexion 120° 130°   Abduction 120° 125°   External Rotation 35° 40°   Internal Rotation (Apley Scratch Test) L5 L3   Extension 50° 50°   SHOULDER PASSIVE RANGE OF MOTION    Flexion 130° 140°   Abduction 120° 125°   External Rotation  40° 50°   Internal Rotation (Apley Scratch Test) 45° 40°           UPPER LOWER EXTREMITY STRENGTH     Left  Right    Shoulder Flexion 4+/5 4+/5   Shoulder Abduction 4+/5 5/5    Shoulder External Rotation 4+/5 4+/5   Shoulder Internal Rotation 4/5 4+/5   Elbow Flexion 5/5 5/5   Elbow Extension 5/5 5/5       Saba received therapeutic exercises to develop strength, endurance, ROM, flexibility, and posture for 20 minutes including:    Warm-up      Supine    Dowel Flexion/External Rotation 2x10  Shoulder Flexion/Abduction 2x8, Red Theraband   Side lying external rotation  and internal rotation stretch - 3' each   Side lying External Rotation/Internal Rotation 2x10, #2    Seated    Prayer Stretch 2x30 sec     Standing    Shoulder Flexion AAROM  on Table 3x10   Dowel Abduction 2x10   Towel Internal Rotation Stretch 2x10  Pec Stretch(corner stretch) 2x30 sec   Rows 3x10, Red Theraband   Pull Downs 3x10, Red Theraband   Wall slides 2x10 (Flexion/Abduction)     *Bold exercise performed       Saba received the following manual therapy techniques: Joint mobilizations, Manual traction, Manual Lymphatic Drainage, and Soft tissue Mobilization were applied to the: left shoulder for 10 minutes, including:    Visit Number:  5 out of 12   Manual Therapy  (amplitude and time)     Soft-Tissue Mobilization to the infraspinatus muscle  Not Performed     Passive range of motion of the left shoulder in all planes  Done   Mobilization of the Left shoulder, Grade 2, Distraction/Inferior/Posterior Done                Saba participated in neuromuscular re-education activities to improve: Coordination, Kinesthetic, Proprioception, Neuromuscular Control and Posture for 15 minutes. The following activities were included:  Isometric Set 2x10, with a 3' sec hold (Flexion/Abduction/Internal Rotation/ External Rotation)  Rhythm Stabilization on Wall 2x15 (Counter-Clockwise/Clockwise/Sideways/Up-Down)   90/90 Isometric Walks 3x10 feet, #5 (Flexion/Abduction), bilaterally     Saba participated in dynamic functional therapeutic activities to improve functional performance for 00  minutes, including:    Visit Number:   5 out of 12   Activities performed   (duration/resistance)                               Home Exercises Provided and Patient Education Provided     Education provided:   - Continue with HEP    Written Home Exercises Provided: Patient instructed to cont prior HEP.  Exercises were reviewed and Saba was able to demonstrate them prior to the end of the session.  Saba demonstrated good  understanding of the education provided.     See EMR under Patient Instructions for exercises provided prior visit.    Assessment     Pt. has been to [facility] for 5 visits due to left shoulder pain. Improvement has been noted with mobility, strength, and neuromuscular control of the left shoulder. Limitations are still present with full mobility (when compared to the right), strength, and activities of daily living. New exercises focused on strength, stability, and overhead movements to improve limitations. No modifications were required with new program with minimal cues needed to correct scapular mechanics. Patient would continue to benefit from skilled Physical Therapy to decrease discomfort and improve mobility of the Left shoulder.     Saba Is progressing well towards His goals.     Pt prognosis is Good.     Pt will continue to benefit from skilled outpatient physical therapy to address the deficits listed in the problem list box on initial evaluation, provide pt/family education and to maximize pt's level of independence in the home and community environment.     Pt's spiritual, cultural and educational needs considered and pt agreeable to plan of care and goals.    Anticipated barriers to physical therapy: None.     Goals:  Short Term Goals: 3 weeks   Patient will be independent with HEP at home to increase PT compliance.  Goal Met 11/23/2022     Patient will be able to lift arm into 130° of flexion/abduction and 50° of Internal Rotation/External Rotation to increase mobility of the shoulder  Progressing 11/23/2022     Patient will be able to sleep for > 5 hours without waking up due to pain to increase sleep capacity.  Goal Met 11/23/2022            Long Term Goals: 6 weeks   Patient will decrease DASH score to < 15% to increase functional capacity.  Progressing 11/23/2022    Patient will be able to lift 10lbs 10 times to at least 90° of flexion to increase overhead activities with < 2/10 pain.  Progressing 11/23/2022    Patient will be able to fish with Left shoulder with < 2/10 pain to increase recreational capacity.  Progressing 11/23/2022              Plan     Updated Plan of Care: 1-2x a week for 4 more weeks.     Progress duration, workload, and resistance levels of the Therapeutic Activities and Exercises if the patient does not exhibit any pain, swelling, or other symptoms. Progress instruction in-home exercise progression and modification, including symptom management.    Israel Zhu, PT, DPT  11/23/2022

## 2022-12-01 ENCOUNTER — CLINICAL SUPPORT (OUTPATIENT)
Dept: REHABILITATION | Facility: HOSPITAL | Age: 67
End: 2022-12-01
Payer: MEDICARE

## 2022-12-01 ENCOUNTER — OFFICE VISIT (OUTPATIENT)
Dept: PRIMARY CARE CLINIC | Facility: CLINIC | Age: 67
End: 2022-12-01
Payer: MEDICARE

## 2022-12-01 VITALS
DIASTOLIC BLOOD PRESSURE: 73 MMHG | TEMPERATURE: 98 F | HEIGHT: 73 IN | WEIGHT: 182.44 LBS | BODY MASS INDEX: 24.18 KG/M2 | OXYGEN SATURATION: 95 % | SYSTOLIC BLOOD PRESSURE: 116 MMHG | HEART RATE: 81 BPM

## 2022-12-01 DIAGNOSIS — J34.89 RHINORRHEA: ICD-10-CM

## 2022-12-01 DIAGNOSIS — M25.512 ACUTE PAIN OF LEFT SHOULDER: ICD-10-CM

## 2022-12-01 DIAGNOSIS — J30.89 ALLERGIC RHINITIS DUE TO OTHER ALLERGIC TRIGGER, UNSPECIFIED SEASONALITY: ICD-10-CM

## 2022-12-01 DIAGNOSIS — R09.81 NASAL CONGESTION: ICD-10-CM

## 2022-12-01 DIAGNOSIS — M75.32 CALCIFIC TENDINITIS OF LEFT SHOULDER: ICD-10-CM

## 2022-12-01 DIAGNOSIS — M19.012 OSTEOARTHRITIS OF LEFT SHOULDER, UNSPECIFIED OSTEOARTHRITIS TYPE: Primary | ICD-10-CM

## 2022-12-01 DIAGNOSIS — J06.9 UPPER RESPIRATORY TRACT INFECTION, UNSPECIFIED TYPE: Primary | ICD-10-CM

## 2022-12-01 PROCEDURE — 3078F PR MOST RECENT DIASTOLIC BLOOD PRESSURE < 80 MM HG: ICD-10-PCS | Mod: CPTII,S$GLB,, | Performed by: NURSE PRACTITIONER

## 2022-12-01 PROCEDURE — 97112 NEUROMUSCULAR REEDUCATION: CPT | Mod: PN,CQ

## 2022-12-01 PROCEDURE — 99213 PR OFFICE/OUTPT VISIT, EST, LEVL III, 20-29 MIN: ICD-10-PCS | Mod: S$GLB,,, | Performed by: NURSE PRACTITIONER

## 2022-12-01 PROCEDURE — 1126F AMNT PAIN NOTED NONE PRSNT: CPT | Mod: CPTII,S$GLB,, | Performed by: NURSE PRACTITIONER

## 2022-12-01 PROCEDURE — 1159F PR MEDICATION LIST DOCUMENTED IN MEDICAL RECORD: ICD-10-PCS | Mod: CPTII,S$GLB,, | Performed by: NURSE PRACTITIONER

## 2022-12-01 PROCEDURE — 3044F PR MOST RECENT HEMOGLOBIN A1C LEVEL <7.0%: ICD-10-PCS | Mod: CPTII,S$GLB,, | Performed by: NURSE PRACTITIONER

## 2022-12-01 PROCEDURE — 3074F PR MOST RECENT SYSTOLIC BLOOD PRESSURE < 130 MM HG: ICD-10-PCS | Mod: CPTII,S$GLB,, | Performed by: NURSE PRACTITIONER

## 2022-12-01 PROCEDURE — 99999 PR PBB SHADOW E&M-EST. PATIENT-LVL IV: CPT | Mod: PBBFAC,,, | Performed by: NURSE PRACTITIONER

## 2022-12-01 PROCEDURE — 3008F BODY MASS INDEX DOCD: CPT | Mod: CPTII,S$GLB,, | Performed by: NURSE PRACTITIONER

## 2022-12-01 PROCEDURE — 97110 THERAPEUTIC EXERCISES: CPT | Mod: PN,CQ

## 2022-12-01 PROCEDURE — 1101F PT FALLS ASSESS-DOCD LE1/YR: CPT | Mod: CPTII,S$GLB,, | Performed by: NURSE PRACTITIONER

## 2022-12-01 PROCEDURE — 1159F MED LIST DOCD IN RCRD: CPT | Mod: CPTII,S$GLB,, | Performed by: NURSE PRACTITIONER

## 2022-12-01 PROCEDURE — 3288F FALL RISK ASSESSMENT DOCD: CPT | Mod: CPTII,S$GLB,, | Performed by: NURSE PRACTITIONER

## 2022-12-01 PROCEDURE — 3008F PR BODY MASS INDEX (BMI) DOCUMENTED: ICD-10-PCS | Mod: CPTII,S$GLB,, | Performed by: NURSE PRACTITIONER

## 2022-12-01 PROCEDURE — 3074F SYST BP LT 130 MM HG: CPT | Mod: CPTII,S$GLB,, | Performed by: NURSE PRACTITIONER

## 2022-12-01 PROCEDURE — 97140 MANUAL THERAPY 1/> REGIONS: CPT | Mod: PN,CQ

## 2022-12-01 PROCEDURE — 99213 OFFICE O/P EST LOW 20 MIN: CPT | Mod: S$GLB,,, | Performed by: NURSE PRACTITIONER

## 2022-12-01 PROCEDURE — 3044F HG A1C LEVEL LT 7.0%: CPT | Mod: CPTII,S$GLB,, | Performed by: NURSE PRACTITIONER

## 2022-12-01 PROCEDURE — 99999 PR PBB SHADOW E&M-EST. PATIENT-LVL IV: ICD-10-PCS | Mod: PBBFAC,,, | Performed by: NURSE PRACTITIONER

## 2022-12-01 PROCEDURE — 3078F DIAST BP <80 MM HG: CPT | Mod: CPTII,S$GLB,, | Performed by: NURSE PRACTITIONER

## 2022-12-01 PROCEDURE — 3288F PR FALLS RISK ASSESSMENT DOCUMENTED: ICD-10-PCS | Mod: CPTII,S$GLB,, | Performed by: NURSE PRACTITIONER

## 2022-12-01 PROCEDURE — 1126F PR PAIN SEVERITY QUANTIFIED, NO PAIN PRESENT: ICD-10-PCS | Mod: CPTII,S$GLB,, | Performed by: NURSE PRACTITIONER

## 2022-12-01 PROCEDURE — 1101F PR PT FALLS ASSESS DOC 0-1 FALLS W/OUT INJ PAST YR: ICD-10-PCS | Mod: CPTII,S$GLB,, | Performed by: NURSE PRACTITIONER

## 2022-12-01 RX ORDER — AZELASTINE 1 MG/ML
1 SPRAY, METERED NASAL 2 TIMES DAILY
Qty: 30 ML | Refills: 0 | Status: SHIPPED | OUTPATIENT
Start: 2022-12-01 | End: 2023-10-24 | Stop reason: SDUPTHER

## 2022-12-01 RX ORDER — FLUTICASONE PROPIONATE 50 MCG
1 SPRAY, SUSPENSION (ML) NASAL DAILY
Qty: 16 G | Refills: 1 | Status: SHIPPED | OUTPATIENT
Start: 2022-12-01 | End: 2023-04-18

## 2022-12-01 RX ORDER — AMOXICILLIN AND CLAVULANATE POTASSIUM 875; 125 MG/1; MG/1
1 TABLET, FILM COATED ORAL 2 TIMES DAILY
Qty: 20 TABLET | Refills: 0 | Status: SHIPPED | OUTPATIENT
Start: 2022-12-01 | End: 2022-12-11

## 2022-12-01 NOTE — PROGRESS NOTES
Physical Therapy Daily Treatment Note     Name: Saba Hansen  Clinic Number: [id number]    Therapy Diagnosis:   Encounter Diagnoses   Name Primary?    Osteoarthritis of left shoulder, unspecified osteoarthritis type Yes    Calcific tendinitis of left shoulder     Acute pain of left shoulder        Physician: Molly Barrios NP    Visit Date: 12/1/2022    Evaluation Date: 10/24/2022  Authorization Period Expiration: 12/31/2022  Plan of Care Expiration: , 12/21/2022  Progress Note Due: 12/23/2022  Visit # / Visits authorized: 6/ 12   Remaining Visits Scheduled - 05  PTA Consecutive Visits - 1/6     Eval Visit FOTO - 45 (10/24/2022)   5th Visit FOTO  - 56 (11/23/2022)   10th Visit FOTO - (Date/Score/Turn Green)   D/C FOTO -  (Date/Score/Turn Green)      Time In: 8:47  Time Out: 9:30  Billable Time: 43 minutes  Non-Billable Time: 00 minutes    Precautions: Standard  Insurance: Payor: [email or web address] MEDICARE / Plan: [employer] (SPECIAL NEEDS PLAN) / Product Type: Medicare Advantage /     Subjective     Pt. Reports: improvement with ROM and reduced pain  He is compliant with home exercise program.  Response to previous treatment: Good    Pre-Treatment Pain Rating: 3/10  Post-Treatment Pain Rating: 3/10  Location: left shoulder      Objective         Saba received therapeutic exercises to develop strength, endurance, ROM, flexibility, and posture for 20 minutes including:    Warm-up      Supine    Dowel Flexion/External Rotation 2x10  Shoulder Flexion/Abduction 2x8, Red Theraband   Side lying external rotation  and internal rotation stretch - 3' each   Side lying External Rotation/Internal Rotation 2x10, #2    Seated    Prayer Stretch 2x30 sec     Standing    Shoulder Flexion AAROM  on Table 3x10   Dowel Abduction 2x10   Towel Internal Rotation Stretch 2x10  Pec Stretch(corner stretch) 2x30 sec   Rows 3x10, Red Theraband   Pull Downs 3x10, Red Theraband   Wall slides 2x10 (Flexion/Abduction)     *Bold exercise  performed       Saba received the following manual therapy techniques: Joint mobilizations, Manual traction, Manual Lymphatic Drainage, and Soft tissue Mobilization were applied to the: left shoulder for 10 minutes, including:    Visit Number:  5 out of 12   Manual Therapy  (amplitude and time)     Soft-Tissue Mobilization to the infraspinatus muscle  Not Performed     Passive range of motion of the left shoulder in all planes  Done   Mobilization of the Left shoulder, Grade 2, Distraction/Inferior/Posterior Done                aSba participated in neuromuscular re-education activities to improve: Coordination, Kinesthetic, Proprioception, Neuromuscular Control and Posture for 15 minutes. The following activities were included:  Isometric Set 2x10, with a 3' sec hold (Flexion/Abduction/Internal Rotation/ External Rotation)  Rhythm Stabilization on Wall 2x15 (Counter-Clockwise/Clockwise/Sideways/Up-Down)   90/90 Isometric Walks 3x10 feet, #5 (Flexion/Abduction), bilaterally     Saba participated in dynamic functional therapeutic activities to improve functional performance for 00  minutes, including:    Visit Number:  5 out of 12   Activities performed   (duration/resistance)                               Home Exercises Provided and Patient Education Provided     Education provided:   - Continue with HEP    Written Home Exercises Provided: Patient instructed to cont prior HEP.  Exercises were reviewed and Saba was able to demonstrate them prior to the end of the session.  Saba demonstrated good  understanding of the education provided.     See EMR under Patient Instructions for exercises provided prior visit.    Assessment     Pt tolerated session well. AAROM flexion and abduction show improvement. Control with ball on wall stabilization is better. 90/90 walking caused some fatigue in the rotator cuff. Pt was encouraged to take rest breaks when he could no longer hold proper form. Saba is pleased with his  progress so far and will continue to benefit from skilled therapy.     Saba Is progressing well towards His goals.     Pt prognosis is Good.     Pt will continue to benefit from skilled outpatient physical therapy to address the deficits listed in the problem list box on initial evaluation, provide pt/family education and to maximize pt's level of independence in the home and community environment.     Pt's spiritual, cultural and educational needs considered and pt agreeable to plan of care and goals.    Anticipated barriers to physical therapy: None.     Goals:  Short Term Goals: 3 weeks   Patient will be independent with HEP at home to increase PT compliance.  Goal Met 11/23/2022     Patient will be able to lift arm into 130° of flexion/abduction and 50° of Internal Rotation/External Rotation to increase mobility of the shoulder Progressing 12/1/2022     Patient will be able to sleep for > 5 hours without waking up due to pain to increase sleep capacity.  Goal Met 11/23/2022            Long Term Goals: 6 weeks   Patient will decrease DASH score to < 15% to increase functional capacity.  Progressing 12/1/2022    Patient will be able to lift 10lbs 10 times to at least 90° of flexion to increase overhead activities with < 2/10 pain.  Progressing 12/1/2022    Patient will be able to fish with Left shoulder with < 2/10 pain to increase recreational capacity.  Progressing 12/1/2022              Plan     Updated Plan of Care: 1-2x a week for 4 more weeks.     Progress duration, workload, and resistance levels of the Therapeutic Activities and Exercises if the patient does not exhibit any pain, swelling, or other symptoms. Progress instruction in-home exercise progression and modification, including symptom management.    Lexy Kebede, RICKIE,   12/1/2022

## 2022-12-01 NOTE — PROGRESS NOTES
Subjective:       Patient ID: Saba Hansen is a 67 y.o. male.    Chief Complaint: Follow-up     Mr. Saba Hansen is a 67 year old male, established with me, presents to the clinic for follow up with new complaints of cough and congestion. PCP is Drew Avendano. Medical and surgical history in addition to problem list reviewed as listed below    Patient reports that his left shoulder has improved significantly secondary to physical therapy.    URI   This is a new problem. The current episode started 1 to 4 weeks ago. The problem has been gradually worsening. There has been no fever. Associated symptoms include congestion (nasal), coughing (yellow/green sputum), a plugged ear sensation, rhinorrhea, sinus pain, sneezing and a sore throat. Pertinent negatives include no nausea or vomiting. Treatments tried: vicks nyquil. The treatment provided mild relief.     Past Medical History:   Diagnosis Date    Allergy     Asthma         Past Surgical History:   Procedure Laterality Date    AORTOPEXY W/ MLB      NASAL SINUS SURGERY      TONSILLECTOMY          Family History   Problem Relation Age of Onset    Hypertension Mother     No Known Problems Father     Allergic rhinitis Neg Hx     Allergies Neg Hx     Angioedema Neg Hx     Asthma Neg Hx     Atopy Neg Hx     Rhinitis Neg Hx     Urticaria Neg Hx     Immunodeficiency Neg Hx     Eczema Neg Hx        Social History     Tobacco Use   Smoking Status Never   Smokeless Tobacco Never       Social History     Social History Narrative    Not on file       Review of patient's allergies indicates:   Allergen Reactions    Iodinated contrast media      Hives (skin)^and itchy skin  Hives (skin)^and itchy skin          Review of Systems   Constitutional:  Negative for chills and fever.   HENT:  Positive for congestion (nasal), postnasal drip, rhinorrhea, sinus pressure, sinus pain, sneezing and sore throat.    Respiratory:  Positive for cough (yellow/green sputum). Negative for shortness  of breath.    Gastrointestinal:  Negative for nausea and vomiting.       Objective:        Vitals:    12/01/22 0946   BP: 116/73   Pulse: 81   Temp: 97.6 °F (36.4 °C)        Physical Exam  Constitutional:       Appearance: Normal appearance.   Cardiovascular:      Rate and Rhythm: Normal rate.      Pulses: Normal pulses.   Pulmonary:      Effort: Pulmonary effort is normal. No respiratory distress.      Breath sounds: No wheezing.   Musculoskeletal:         General: Normal range of motion.   Skin:     General: Skin is warm and dry.   Neurological:      Mental Status: He is alert and oriented to person, place, and time.       Assessment:       1. Upper respiratory tract infection, unspecified type    2. Allergic rhinitis due to other allergic trigger, unspecified seasonality    3. Nasal congestion    4. Rhinorrhea        Plan:       Upper respiratory tract infection, unspecified type  -     amoxicillin-clavulanate 875-125mg (AUGMENTIN) 875-125 mg per tablet; Take 1 tablet by mouth 2 (two) times daily. for 10 days  Dispense: 20 tablet; Refill: 0    Allergic rhinitis due to other allergic trigger, unspecified seasonality  -     fluticasone propionate (FLONASE) 50 mcg/actuation nasal spray; 1 spray (50 mcg total) by Each Nostril route once daily.  Dispense: 16 g; Refill: 1    Nasal congestion  -     fluticasone propionate (FLONASE) 50 mcg/actuation nasal spray; 1 spray (50 mcg total) by Each Nostril route once daily.  Dispense: 16 g; Refill: 1    Rhinorrhea  -     azelastine (ASTELIN) 137 mcg (0.1 %) nasal spray; 1 spray (137 mcg total) by Nasal route 2 (two) times daily.  Dispense: 30 mL; Refill: 0      Maintain hydration.  Yogurt/Probiotic with Antibiotics as tolerated if no lactose intolerance.  Rest.      Medication List with Changes/Refills   New Medications    AMOXICILLIN-CLAVULANATE 875-125MG (AUGMENTIN) 875-125 MG PER TABLET    Take 1 tablet by mouth 2 (two) times daily. for 10 days    AZELASTINE (ASTELIN) 137  MCG (0.1 %) NASAL SPRAY    1 spray (137 mcg total) by Nasal route 2 (two) times daily.   Current Medications    ADVAIR -21 MCG/ACTUATION HFAA INHALER    INHALE 2 PUFFS BY MOUTH TWICE DAILY    ALBUTEROL (PROAIR HFA) 90 MCG/ACTUATION INHALER    Inhale 2 puffs into the lungs every 6 (six) hours as needed for Wheezing. Rescue    ATORVASTATIN (LIPITOR) 40 MG TABLET    Take 1 tablet (40 mg total) by mouth once daily.    CETIRIZINE (ZYRTEC) 10 MG TABLET    Take 1 tablet (10 mg total) by mouth once daily.    COMP-AIR NEBULIZER COMPRESSOR RAFY    use as directed    FINASTERIDE (PROSCAR) 5 MG TABLET    Take 1 tablet (5 mg total) by mouth once daily.    IBUPROFEN (ADVIL,MOTRIN) 800 MG TABLET    Take 1 tablet (800 mg total) by mouth every 6 (six) hours as needed for Pain (L shoulder pain).    MONTELUKAST (SINGULAIR) 10 MG TABLET    Take 1 tablet (10 mg total) by mouth once daily.    TADALAFIL (CIALIS) 20 MG TAB    Take 1 tablet (20 mg total) by mouth once daily.    TAMSULOSIN (FLOMAX) 0.4 MG CAP    Take 1 capsule (0.4 mg total) by mouth once daily.   Changed and/or Refilled Medications    Modified Medication Previous Medication    FLUTICASONE PROPIONATE (FLONASE) 50 MCG/ACTUATION NASAL SPRAY fluticasone propionate (FLONASE) 50 mcg/actuation nasal spray       1 spray (50 mcg total) by Each Nostril route once daily.    1 spray (50 mcg total) by Each Nostril route once daily.            Follow up if symptoms worsen or fail to improve.    Molly Barrios, APRN, MSN, FNP-C

## 2022-12-06 ENCOUNTER — DOCUMENTATION ONLY (OUTPATIENT)
Dept: REHABILITATION | Facility: HOSPITAL | Age: 67
End: 2022-12-06

## 2022-12-06 ENCOUNTER — CLINICAL SUPPORT (OUTPATIENT)
Dept: REHABILITATION | Facility: HOSPITAL | Age: 67
End: 2022-12-06
Payer: MEDICARE

## 2022-12-06 DIAGNOSIS — M19.012 OSTEOARTHRITIS OF LEFT SHOULDER, UNSPECIFIED OSTEOARTHRITIS TYPE: Primary | ICD-10-CM

## 2022-12-06 DIAGNOSIS — M75.32 CALCIFIC TENDINITIS OF LEFT SHOULDER: ICD-10-CM

## 2022-12-06 DIAGNOSIS — M25.512 ACUTE PAIN OF LEFT SHOULDER: ICD-10-CM

## 2022-12-06 PROCEDURE — 97140 MANUAL THERAPY 1/> REGIONS: CPT | Mod: PN,CQ

## 2022-12-06 PROCEDURE — 97112 NEUROMUSCULAR REEDUCATION: CPT | Mod: PN,CQ

## 2022-12-06 PROCEDURE — 97110 THERAPEUTIC EXERCISES: CPT | Mod: PN,CQ

## 2022-12-06 NOTE — PROGRESS NOTES
30 day PT-PTA face-face discussion with Israel Zhu DPT re:Name: Saba Hansen Clinic Number: [id number] patient status, POC, and plan for progression done

## 2022-12-06 NOTE — PROGRESS NOTES
Physical Therapy Daily Treatment Note     Name: Saba Hansen  Clinic Number: [id number]    Therapy Diagnosis:   Encounter Diagnoses   Name Primary?    Osteoarthritis of left shoulder, unspecified osteoarthritis type Yes    Calcific tendinitis of left shoulder     Acute pain of left shoulder          Physician: Molly Barrios NP    Visit Date: 2022    Evaluation Date: 10/24/2022  Authorization Period Expiration: 2022  Plan of Care Expiration: , 2022  Progress Note Due: 2022  Visit # / Visits authorized:    Remaining Visits Scheduled - 04  PTA Consecutive Visits -      Eval Visit FOTO - 45 (10/24/2022)   5th Visit FOTO  - 56 (2022)   10th Visit FOTO - (Date/Score/Turn Green)   D/C FOTO -  (Date/Score/Turn Green)      Time In: 9:30  Time Out: 10:15  Billable Time: 45 minutes  Non-Billable Time: 00 minutes    Precautions: Standard  Insurance: Payor: [employer] [place] MEDICARE / Plan: [employer] (SPECIAL NEEDS PLAN) / Product Type: Medicare Advantage /     Subjective     Pt. Reports: No pain today just a 1 when he moves  He is compliant with home exercise program.  Response to previous treatment: Good    Pre-Treatment Pain Ratin/10  Post-Treatment Pain Ratin/10  Location: left shoulder      Objective         Saba received therapeutic exercises to develop strength, endurance, ROM, flexibility, and posture for 20 minutes including:    Warm-up      Supine    Dowel Flexion/External Rotation 2x10 (flexion only before and after joint mobilizations)  Shoulder Flexion/Abduction 2x8, Red Theraband   Side lying external rotation  and internal rotation stretch - 3' each   Side lying External Rotation/Internal Rotation 2x10, #2   Snow angels on  foam roller 2x10    Seated    Prayer Stretch 3x30 sec     Standing    Shoulder Flexion AAROM  on Table 3x10   Dowel Abduction 2x10   Towel Internal Rotation Stretch 2x10  Pec Stretch(corner stretch) 2x30 sec   Rows 3x10, Red Theraband    Pull Downs 3x10, Red Theraband   Wall slides 2x10 (Flexion/Abduction)    Green theraband IR/ER 2x10    *Bold exercise performed       Saba received the following manual therapy techniques: Joint mobilizations, Manual traction, Manual Lymphatic Drainage, and Soft tissue Mobilization were applied to the: left shoulder for 10 minutes, including:    Visit Number:  5 out of 12   Manual Therapy  (amplitude and time)     Soft-Tissue Mobilization to the infraspinatus muscle  Not Performed     Passive range of motion of the left shoulder in all planes  Done   Mobilization of the Left shoulder, Grade 2, Distraction/Inferior/Posterior Done                Saba participated in neuromuscular re-education activities to improve: Coordination, Kinesthetic, Proprioception, Neuromuscular Control and Posture for 15 minutes. The following activities were included:  Isometric Set 2x10, with a 3' sec hold (Flexion/Abduction/Internal Rotation/ External Rotation)  Rhythm Stabilization on Wall 2x15 (Counter-Clockwise/Clockwise/Sideways/Up-Down)   90/90 Isometric Walks 3x10 feet, #5 (Flexion/Abduction), bilaterally     Saba participated in dynamic functional therapeutic activities to improve functional performance for 00  minutes, including:    Visit Number:  5 out of 12   Activities performed   (duration/resistance)                               Home Exercises Provided and Patient Education Provided     Education provided:   - Continue with HEP    Written Home Exercises Provided: Patient instructed to cont prior HEP.  Exercises were reviewed and Saba was able to demonstrate them prior to the end of the session.  Saba demonstrated good  understanding of the education provided.     See EMR under Patient Instructions for exercises provided prior visit.    Assessment     Pt tolerated session well. Pt presents with forward shoulder posture contributing to tightness in the anterior capsule. Pec major and pec minor length should be  assessed next  session.  Improvement in AROM seen after joint mobilizations and pec stretch. Pain with end range reduced. Tolerance for Rotator cuff stabilization exercises had increased. Supine snow angels with 1/2 foal roller under thoracic spine performed to improve scapular mobility and posture. ER and IR added to strengthen RC muscles,both exercises challenging due to weakness. Verbal and tactile cues used to improve ther ex. Saba will benefit from skilled therapy.     Saba Is progressing well towards His goals.     Pt prognosis is Good.     Pt will continue to benefit from skilled outpatient physical therapy to address the deficits listed in the problem list box on initial evaluation, provide pt/family education and to maximize pt's level of independence in the home and community environment.     Pt's spiritual, cultural and educational needs considered and pt agreeable to plan of care and goals.    Anticipated barriers to physical therapy: None.     Goals:  Short Term Goals: 3 weeks   Patient will be independent with HEP at home to increase PT compliance.  Goal Met 11/23/2022     Patient will be able to lift arm into 130° of flexion/abduction and 50° of Internal Rotation/External Rotation to increase mobility of the shoulder Progressing 12/6/2022     Patient will be able to sleep for > 5 hours without waking up due to pain to increase sleep capacity.  Goal Met 11/23/2022            Long Term Goals: 6 weeks   Patient will decrease DASH score to < 15% to increase functional capacity.  Progressing 12/6/2022    Patient will be able to lift 10lbs 10 times to at least 90° of flexion to increase overhead activities with < 2/10 pain.  Progressing 12/6/2022    Patient will be able to fish with Left shoulder with < 2/10 pain to increase recreational capacity.  Progressing 12/6/2022              Plan     Updated Plan of Care: 1-2x a week for 4 more weeks.     Progress duration, workload, and resistance levels of the  Therapeutic Activities and Exercises if the patient does not exhibit any pain, swelling, or other symptoms. Progress instruction in-home exercise progression and modification, including symptom management.    Lexy Kebede, PTA,   12/6/2022

## 2022-12-09 ENCOUNTER — CLINICAL SUPPORT (OUTPATIENT)
Dept: REHABILITATION | Facility: HOSPITAL | Age: 67
End: 2022-12-09
Payer: MEDICARE

## 2022-12-09 DIAGNOSIS — M75.32 CALCIFIC TENDINITIS OF LEFT SHOULDER: ICD-10-CM

## 2022-12-09 DIAGNOSIS — M19.012 OSTEOARTHRITIS OF LEFT SHOULDER, UNSPECIFIED OSTEOARTHRITIS TYPE: Primary | ICD-10-CM

## 2022-12-09 DIAGNOSIS — M25.512 ACUTE PAIN OF LEFT SHOULDER: ICD-10-CM

## 2022-12-09 PROCEDURE — 97110 THERAPEUTIC EXERCISES: CPT | Mod: PN

## 2022-12-09 NOTE — PROGRESS NOTES
Physical Therapy Daily Treatment Note/ Discharge      Name: Saba Hansen  Clinic Number: [id number]    Therapy Diagnosis:   Encounter Diagnoses   Name Primary?    Osteoarthritis of left shoulder, unspecified osteoarthritis type Yes    Calcific tendinitis of left shoulder     Acute pain of left shoulder          Physician: Molly Barrios NP    Visit Date: 2022    Evaluation Date: 10/24/2022  Authorization Period Expiration: 2022  Plan of Care Expiration: , 2022  Progress Note Due: 2022  Visit # / Visits authorized:    Remaining Visits Scheduled - 04  PTA Consecutive Visits - /     Eval Visit FOTO - 45 (10/24/2022)   5th Visit FOTO  - 56 (2022)   10th Visit FOTO - (Date/Score/Turn Green)   D/C FOTO -  (Date/Score/Turn Green)      Time In: 9:30  Time Out: 10:15  Billable Time: 45 minutes  Non-Billable Time: 00 minutes    Precautions: Standard  Insurance: Payor: [employer] [place] MEDICARE / Plan: [place] (SPECIAL NEEDS PLAN) / Product Type: Medicare Advantage /     Subjective     Pt. Reports: No pain with improved flexibility.  He is compliant with home exercise program.  Response to previous treatment: Good    Pre-Treatment Pain Ratin/10  Post-Treatment Pain Ratin/10  Location: left shoulder      Objective             SHOULDER ACTIVE RANGE OF MOTION     Left Right    Flexion 130° 130°   Abduction 125° 125°   External Rotation 40° 35°   Internal Rotation (Apley Scratch Test) L4 L4   Extension 50° 50°   SHOULDER PASSIVE RANGE OF MOTION    Flexion 130° 140°   Abduction 120° 125°   External Rotation  30° 50°   Internal Rotation (Apley Scratch Test) 50° 40°           UPPER LOWER EXTREMITY STRENGTH     Left  Right    Shoulder Flexion 4/5 4+/5   Shoulder Abduction 4/5 5/5   Shoulder External Rotation 4/5 4/5   Shoulder Internal Rotation 4/5 4+/5   Elbow Flexion 4+/5 5/5   Elbow Extension 4+/5 5/5             SPECIAL TESTS   Impingement Cluster   Ortiz-Rodrigo Positive     Painful Arc Sign negative   Infraspinatus MMT Painful, Unable to sustain   Additional Impingement   NEER Positive    Coracoid Impingement Positive    Cross-Arm Adduction Positive    Anterior Instability Cluster    Anterior Apprehension Test Negative    Relocation Test Negative         Jeorol received therapeutic exercises to develop strength, endurance, ROM, flexibility, and posture for 20 minutes including:    Warm-up      Supine      Shoulder Flexion/Abduction 2x8, Red Theraband   Side lying external rotation  and internal rotation stretch - 3' each   Side lying External Rotation/Internal Rotation 2x10, #2   Snow angels on 1/2 foam roller 2x10    Seated    Prayer Stretch 3x30 sec     Standing    Dowel Flexion/External Rotation 2x10 (flexion only before and after joint mobilizations)  Shoulder Flexion AAROM  on Table 3x10   Dowel Abduction 2x10   Towel Internal Rotation Stretch 2x10  Pec Stretch(corner stretch) 2x30 sec   Rows 3x10, Red Theraband   Pull Downs 3x10, Red Theraband   Wall slides 2x10 (Flexion/Abduction)    Green theraband IR/ER 2x10    *Bold exercise performed       Jeorol received the following manual therapy techniques: Joint mobilizations, Manual traction, Manual Lymphatic Drainage, and Soft tissue Mobilization were applied to the: left shoulder for 10 minutes, including:    Visit Number:  5 out of 12   Manual Therapy  (amplitude and time)     Soft-Tissue Mobilization to the infraspinatus muscle  Not Performed     Passive range of motion of the left shoulder in all planes  Done   Mobilization of the Left shoulder, Grade 2, Distraction/Inferior/Posterior Done                Juniororol participated in neuromuscular re-education activities to improve: Coordination, Kinesthetic, Proprioception, Neuromuscular Control and Posture for 15 minutes. The following activities were included:  Isometric Set 2x10, with a 3' sec hold (Flexion/Abduction/Internal Rotation/ External Rotation)  Rhythm Stabilization on  Wall 2x15 (Counter-Clockwise/Clockwise/Sideways/Up-Down)   90/90 Isometric Walks 3x10 feet, #5 (Flexion/Abduction), bilaterally     Saba participated in dynamic functional therapeutic activities to improve functional performance for 00  minutes, including:    Visit Number:  5 out of 12   Activities performed   (duration/resistance)                               Home Exercises Provided and Patient Education Provided     Education provided:   - Continue with HEP    Written Home Exercises Provided: Patient instructed to cont prior HEP.  Exercises were reviewed and Saba was able to demonstrate them prior to the end of the session.  Saba demonstrated good  understanding of the education provided.     See EMR under Patient Instructions for exercises provided prior visit.    Assessment     Patient currently presents to therapy with improved left shoulder range of motion and strength during today's treatment session, with reduction in shoulder pain with active range of motion. Patient educated to continue home exercise program.Patient will be discharge from therapy services    Saba Is progressing well towards His goals.     Pt prognosis is Good.     Pt will continue to benefit from skilled outpatient physical therapy to address the deficits listed in the problem list box on initial evaluation, provide pt/family education and to maximize pt's level of independence in the home and community environment.     Pt's spiritual, cultural and educational needs considered and pt agreeable to plan of care and goals.    Anticipated barriers to physical therapy: None.     Goals:  Short Term Goals: 3 weeks   Patient will be independent with HEP at home to increase PT compliance.  Goal Met 11/23/2022     Patient will be able to lift arm into 130° of flexion/abduction and 50° of Internal Rotation/External Rotation to increase mobility of the shoulder Goal Met    Patient will be able to sleep for > 5 hours without waking up due to  pain to increase sleep capacity.  Goal Met 11/23/2022            Long Term Goals: 6 weeks   Patient will decrease DASH score to < 15% to increase functional capacity.  Goal Met    Patient will be able to lift 10lbs 10 times to at least 90° of flexion to increase overhead activities with < 2/10 pain.  Goal Met    Patient will be able to fish with Left shoulder with < 2/10 pain to increase recreational capacity.  Goal Met              Plan     Discharge from therapy services     Flavio Yadav, PT,   12/9/2022

## 2022-12-12 ENCOUNTER — OFFICE VISIT (OUTPATIENT)
Dept: OPTOMETRY | Facility: CLINIC | Age: 67
End: 2022-12-12
Payer: COMMERCIAL

## 2022-12-12 DIAGNOSIS — H25.13 NUCLEAR SCLEROSIS, BILATERAL: Primary | ICD-10-CM

## 2022-12-12 DIAGNOSIS — Z01.00 ENCOUNTER FOR ROUTINE EYE AND VISION EXAMINATION: ICD-10-CM

## 2022-12-12 DIAGNOSIS — Z13.5 GLAUCOMA SCREENING: ICD-10-CM

## 2022-12-12 PROCEDURE — 92004 PR EYE EXAM, NEW PATIENT,COMPREHESV: ICD-10-PCS | Mod: S$GLB,,, | Performed by: OPTOMETRIST

## 2022-12-12 PROCEDURE — 99999 PR PBB SHADOW E&M-EST. PATIENT-LVL IV: CPT | Mod: PBBFAC,,, | Performed by: OPTOMETRIST

## 2022-12-12 PROCEDURE — 92015 DETERMINE REFRACTIVE STATE: CPT | Mod: S$GLB,,, | Performed by: OPTOMETRIST

## 2022-12-12 PROCEDURE — 92015 PR REFRACTION: ICD-10-PCS | Mod: S$GLB,,, | Performed by: OPTOMETRIST

## 2022-12-12 PROCEDURE — 92004 COMPRE OPH EXAM NEW PT 1/>: CPT | Mod: S$GLB,,, | Performed by: OPTOMETRIST

## 2022-12-12 PROCEDURE — 99999 PR PBB SHADOW E&M-EST. PATIENT-LVL IV: ICD-10-PCS | Mod: PBBFAC,,, | Performed by: OPTOMETRIST

## 2023-03-18 NOTE — PROGRESS NOTES
HPI    LUCIO: 5 years ago or longer    Patient states he has never worn glasses and his vision is blurry every   once in a while. Denies flashes, floaters, diplopia, photophobia, glare,   HA's, or pain. Sometimes his eyes gets itchy and may use some OTC eye   drops. No previous eye sx.     No gtts.   Last edited by Juany Perez on 12/12/2022  2:48 PM.        ROS    Negative for: Constitutional, Gastrointestinal, Neurological, Skin,   Genitourinary, Musculoskeletal, HENT, Endocrine, Cardiovascular, Eyes,   Respiratory, Psychiatric, Allergic/Imm, Heme/Lymph  Last edited by Titi Adamson, OD on 12/12/2022  3:05 PM.        Assessment /Plan     For exam results, see Encounter Report.    Nuclear sclerosis, bilateral    Encounter for routine eye and vision examination  -     Ambulatory referral/consult to Ophthalmology    Glaucoma screening      Cat OU  Pt wishes spex Rx--never worn before.  Discussed PALs/adaptation due to high cyl    PLAN:    Rtc 1 yr                    Manual pressure was applied to the right femoral artery sheath insertion site.

## 2023-04-03 ENCOUNTER — LAB VISIT (OUTPATIENT)
Dept: LAB | Facility: HOSPITAL | Age: 68
End: 2023-04-03
Payer: MEDICARE

## 2023-04-03 DIAGNOSIS — R97.20 ELEVATED PSA: ICD-10-CM

## 2023-04-03 LAB — COMPLEXED PSA SERPL-MCNC: 4.1 NG/ML (ref 0–4)

## 2023-04-03 PROCEDURE — 84153 ASSAY OF PSA TOTAL: CPT | Performed by: NURSE PRACTITIONER

## 2023-04-03 PROCEDURE — 36415 COLL VENOUS BLD VENIPUNCTURE: CPT | Performed by: NURSE PRACTITIONER

## 2023-04-10 ENCOUNTER — OFFICE VISIT (OUTPATIENT)
Dept: UROLOGY | Facility: CLINIC | Age: 68
End: 2023-04-10
Payer: MEDICARE

## 2023-04-10 VITALS
HEART RATE: 79 BPM | WEIGHT: 182.13 LBS | HEIGHT: 73 IN | BODY MASS INDEX: 24.14 KG/M2 | SYSTOLIC BLOOD PRESSURE: 123 MMHG | DIASTOLIC BLOOD PRESSURE: 75 MMHG

## 2023-04-10 DIAGNOSIS — R97.20 ELEVATED PSA: Primary | ICD-10-CM

## 2023-04-10 DIAGNOSIS — J33.9 NASAL POLYPS: ICD-10-CM

## 2023-04-10 DIAGNOSIS — J32.8 OTHER CHRONIC SINUSITIS: ICD-10-CM

## 2023-04-10 PROCEDURE — 99214 PR OFFICE/OUTPT VISIT, EST, LEVL IV, 30-39 MIN: ICD-10-PCS | Mod: S$GLB,,, | Performed by: NURSE PRACTITIONER

## 2023-04-10 PROCEDURE — 99999 PR PBB SHADOW E&M-EST. PATIENT-LVL IV: CPT | Mod: PBBFAC,,, | Performed by: NURSE PRACTITIONER

## 2023-04-10 PROCEDURE — 1101F PR PT FALLS ASSESS DOC 0-1 FALLS W/OUT INJ PAST YR: ICD-10-PCS | Mod: CPTII,S$GLB,, | Performed by: NURSE PRACTITIONER

## 2023-04-10 PROCEDURE — 99214 OFFICE O/P EST MOD 30 MIN: CPT | Mod: S$GLB,,, | Performed by: NURSE PRACTITIONER

## 2023-04-10 PROCEDURE — 1159F MED LIST DOCD IN RCRD: CPT | Mod: CPTII,S$GLB,, | Performed by: NURSE PRACTITIONER

## 2023-04-10 PROCEDURE — 3008F PR BODY MASS INDEX (BMI) DOCUMENTED: ICD-10-PCS | Mod: CPTII,S$GLB,, | Performed by: NURSE PRACTITIONER

## 2023-04-10 PROCEDURE — 3008F BODY MASS INDEX DOCD: CPT | Mod: CPTII,S$GLB,, | Performed by: NURSE PRACTITIONER

## 2023-04-10 PROCEDURE — 3288F PR FALLS RISK ASSESSMENT DOCUMENTED: ICD-10-PCS | Mod: CPTII,S$GLB,, | Performed by: NURSE PRACTITIONER

## 2023-04-10 PROCEDURE — 1101F PT FALLS ASSESS-DOCD LE1/YR: CPT | Mod: CPTII,S$GLB,, | Performed by: NURSE PRACTITIONER

## 2023-04-10 PROCEDURE — 3074F SYST BP LT 130 MM HG: CPT | Mod: CPTII,S$GLB,, | Performed by: NURSE PRACTITIONER

## 2023-04-10 PROCEDURE — 1160F PR REVIEW ALL MEDS BY PRESCRIBER/CLIN PHARMACIST DOCUMENTED: ICD-10-PCS | Mod: CPTII,S$GLB,, | Performed by: NURSE PRACTITIONER

## 2023-04-10 PROCEDURE — 99999 PR PBB SHADOW E&M-EST. PATIENT-LVL IV: ICD-10-PCS | Mod: PBBFAC,,, | Performed by: NURSE PRACTITIONER

## 2023-04-10 PROCEDURE — 1160F RVW MEDS BY RX/DR IN RCRD: CPT | Mod: CPTII,S$GLB,, | Performed by: NURSE PRACTITIONER

## 2023-04-10 PROCEDURE — 3078F DIAST BP <80 MM HG: CPT | Mod: CPTII,S$GLB,, | Performed by: NURSE PRACTITIONER

## 2023-04-10 PROCEDURE — 3074F PR MOST RECENT SYSTOLIC BLOOD PRESSURE < 130 MM HG: ICD-10-PCS | Mod: CPTII,S$GLB,, | Performed by: NURSE PRACTITIONER

## 2023-04-10 PROCEDURE — 3078F PR MOST RECENT DIASTOLIC BLOOD PRESSURE < 80 MM HG: ICD-10-PCS | Mod: CPTII,S$GLB,, | Performed by: NURSE PRACTITIONER

## 2023-04-10 PROCEDURE — 1159F PR MEDICATION LIST DOCUMENTED IN MEDICAL RECORD: ICD-10-PCS | Mod: CPTII,S$GLB,, | Performed by: NURSE PRACTITIONER

## 2023-04-10 PROCEDURE — 3288F FALL RISK ASSESSMENT DOCD: CPT | Mod: CPTII,S$GLB,, | Performed by: NURSE PRACTITIONER

## 2023-04-10 NOTE — PROGRESS NOTES
CHIEF COMPLAINT:    Mr. Hansen is a 68 y.o. male presenting for   Chief Complaint   Patient presents with    Follow-up     Had a PSA done on 4/3/2023       PRESENTING ILLNESS:    Saba Hansen is a 68 y.o. male with a PMH of chronic sinusitis, asthma, bph who presents for erectile dysfunction.    Established patient to urology department.  Last seen in urology department 9/15/22.  Presents today for follow up.  Previously evaluated for erectile dysfunction.  Cialis prescribed to pharmacy, which is working well.      He reports a intermittent urinary stream and complete bladder emptying.  Having some urgency at times intermixed with hesitancy.  Not bothersome to him at this time.  Taking both finateride and tamsulosin as prescribed.    No family history of prostate cancer.  Had TRUS biopsy on 4/14/21 when PSA 9.1.  Biopsy remarkable for bph only.  Reviewed recent PSA of 4.1 (8.2 proscar adjusted, previously 4.4).  Will repeat PSA in 6 months.      REVIEW OF SYSTEMS:    Review of Systems   Constitutional:  Negative for chills and fever.   Respiratory:  Negative for shortness of breath.    Cardiovascular:  Negative for chest pain.   Gastrointestinal:  Negative for constipation and diarrhea.   Genitourinary:  Negative for dysuria, flank pain, frequency, hematuria and urgency.        Curve of penis  Nocturia at times   Neurological:  Negative for dizziness and weakness.     PATIENT HISTORY:    Past Medical History:   Diagnosis Date    Allergy     Asthma        Family History   Problem Relation Age of Onset    Hypertension Mother     No Known Problems Father     Allergic rhinitis Neg Hx     Allergies Neg Hx     Angioedema Neg Hx     Asthma Neg Hx     Atopy Neg Hx     Rhinitis Neg Hx     Urticaria Neg Hx     Immunodeficiency Neg Hx     Eczema Neg Hx        Allergies:  Iodinated contrast media    Medications:    Current Outpatient Medications:     ADVAIR -21 mcg/actuation HFAA inhaler, INHALE 2 PUFFS BY MOUTH TWICE  DAILY, Disp: 12 g, Rfl: 3    albuterol (PROAIR HFA) 90 mcg/actuation inhaler, Inhale 2 puffs into the lungs every 6 (six) hours as needed for Wheezing. Rescue, Disp: 18 g, Rfl: 2    alcohol swabs (BD ALCOHOL SWABS) PadM, Apply 1 each topically once daily., Disp: 100 each, Rfl: 0    atorvastatin (LIPITOR) 40 MG tablet, Take 1 tablet (40 mg total) by mouth once daily., Disp: 90 tablet, Rfl: 2    blood glucose control, low (TRUE METRIX LEVEL 1) Soln, 1 each by Misc.(Non-Drug; Combo Route) route once daily., Disp: 1 each, Rfl: 0    blood sugar diagnostic (TRUE METRIX GLUCOSE TEST STRIP) Strp, 1 each by Misc.(Non-Drug; Combo Route) route once daily., Disp: 100 strip, Rfl: 0    blood-glucose meter (TRUE METRIX GLUCOSE METER) kit, Check blood sugar daily as needed, Disp: 1 each, Rfl: 0    COMP-AIR NEBULIZER COMPRESSOR Kary, use as directed, Disp: , Rfl:     finasteride (PROSCAR) 5 mg tablet, TAKE 1 TABLET(5 MG) BY MOUTH EVERY DAY, Disp: 30 tablet, Rfl: 11    fluticasone propionate (FLONASE) 50 mcg/actuation nasal spray, 1 spray (50 mcg total) by Each Nostril route once daily., Disp: 16 g, Rfl: 1    ibuprofen (ADVIL,MOTRIN) 800 MG tablet, Take 1 tablet (800 mg total) by mouth every 6 (six) hours as needed for Pain (L shoulder pain)., Disp: 12 tablet, Rfl: 0    lancets (TRUEPLUS LANCETS) 33 gauge Misc, 1 lancet by Misc.(Non-Drug; Combo Route) route once daily., Disp: 100 each, Rfl: 0    montelukast (SINGULAIR) 10 mg tablet, Take 1 tablet (10 mg total) by mouth every evening., Disp: 90 tablet, Rfl: 0    tadalafiL (CIALIS) 20 MG Tab, Take 1 tablet (20 mg total) by mouth once daily., Disp: 20 tablet, Rfl: 11    tamsulosin (FLOMAX) 0.4 mg Cap, Take 1 capsule (0.4 mg total) by mouth once daily., Disp: 90 capsule, Rfl: 0    azelastine (ASTELIN) 137 mcg (0.1 %) nasal spray, 1 spray (137 mcg total) by Nasal route 2 (two) times daily., Disp: 30 mL, Rfl: 0    cetirizine (ZYRTEC) 10 MG tablet, Take 1 tablet (10 mg total) by mouth once  daily., Disp: 90 tablet, Rfl: 3    Current Facility-Administered Medications:     LIDOcaine HCL 2% jelly, , Topical (Top), Once, Molly Plaza PA-C    PHYSICAL EXAMINATION:    Physical Exam  Vitals and nursing note reviewed.   Constitutional:       Appearance: Normal appearance. He is well-developed.   HENT:      Head: Normocephalic and atraumatic.   Eyes:      Pupils: Pupils are equal, round, and reactive to light.   Pulmonary:      Effort: Pulmonary effort is normal.   Musculoskeletal:         General: Normal range of motion.      Cervical back: Normal range of motion.   Skin:     General: Skin is warm and dry.   Neurological:      Mental Status: He is alert and oriented to person, place, and time.   Psychiatric:         Behavior: Behavior normal.       LABS:    Lab Results   Component Value Date    PSA 4.4 (H) 09/14/2022    PSA 5.1 (H) 02/01/2021    PSADIAG 4.1 (H) 04/03/2023    PSADIAG 4.0 09/29/2022    PSADIAG 6.5 (H) 10/18/2021    PSADIAG 9.1 (H) 03/15/2021    PSADIAG 3.8 10/24/2017       IMPRESSION:  Encounter Diagnoses   Name Primary?    Elevated PSA Yes    Other chronic sinusitis     Nasal polyps          PLAN:  Problem List Items Addressed This Visit       Chronic sinusitis    Relevant Orders    Ambulatory referral/consult to ENT    Nasal polyps    Relevant Orders    Ambulatory referral/consult to ENT     Other Visit Diagnoses       Elevated PSA    -  Primary    Relevant Orders    PROSTATE SPECIFIC ANTIGEN, DIAGNOSTIC            1. Erectile dysfunction   - continue cialis as needed.   2.  Peyronie's disease   Not bothersome to him at this time  3.  Elevated PSA   Reviewed last PSA , repeat in 6 mths   YRN completed 9/2022  4. Bph with obstruction   Continue finasteride and tamsulosin  5. Rtc in 6 mths with PSA prior    I spent over 30 minutes with the patient. Over 50% of the visit was spent in counseling.    Shannen Couch NP

## 2023-04-20 ENCOUNTER — OFFICE VISIT (OUTPATIENT)
Dept: OTOLARYNGOLOGY | Facility: CLINIC | Age: 68
End: 2023-04-20
Payer: MEDICARE

## 2023-04-20 VITALS
HEART RATE: 80 BPM | WEIGHT: 184.75 LBS | BODY MASS INDEX: 24.37 KG/M2 | DIASTOLIC BLOOD PRESSURE: 78 MMHG | SYSTOLIC BLOOD PRESSURE: 123 MMHG

## 2023-04-20 DIAGNOSIS — J33.9 NASAL POLYPOSIS: ICD-10-CM

## 2023-04-20 DIAGNOSIS — J31.0 CHRONIC RHINITIS: Primary | ICD-10-CM

## 2023-04-20 DIAGNOSIS — J45.40 MODERATE PERSISTENT ASTHMA WITHOUT COMPLICATION: ICD-10-CM

## 2023-04-20 DIAGNOSIS — J32.8 OTHER CHRONIC SINUSITIS: ICD-10-CM

## 2023-04-20 PROCEDURE — 31231 NASAL ENDOSCOPY DX: CPT | Mod: S$GLB,,, | Performed by: OTOLARYNGOLOGY

## 2023-04-20 PROCEDURE — 99999 PR PBB SHADOW E&M-EST. PATIENT-LVL IV: CPT | Mod: PBBFAC,,, | Performed by: OTOLARYNGOLOGY

## 2023-04-20 PROCEDURE — 99204 OFFICE O/P NEW MOD 45 MIN: CPT | Mod: 25,S$GLB,, | Performed by: OTOLARYNGOLOGY

## 2023-04-20 PROCEDURE — 99999 PR PBB SHADOW E&M-EST. PATIENT-LVL IV: ICD-10-PCS | Mod: PBBFAC,,, | Performed by: OTOLARYNGOLOGY

## 2023-04-20 PROCEDURE — 3074F SYST BP LT 130 MM HG: CPT | Mod: CPTII,S$GLB,, | Performed by: OTOLARYNGOLOGY

## 2023-04-20 PROCEDURE — 1101F PT FALLS ASSESS-DOCD LE1/YR: CPT | Mod: CPTII,S$GLB,, | Performed by: OTOLARYNGOLOGY

## 2023-04-20 PROCEDURE — 1160F RVW MEDS BY RX/DR IN RCRD: CPT | Mod: CPTII,S$GLB,, | Performed by: OTOLARYNGOLOGY

## 2023-04-20 PROCEDURE — 3074F PR MOST RECENT SYSTOLIC BLOOD PRESSURE < 130 MM HG: ICD-10-PCS | Mod: CPTII,S$GLB,, | Performed by: OTOLARYNGOLOGY

## 2023-04-20 PROCEDURE — 1126F PR PAIN SEVERITY QUANTIFIED, NO PAIN PRESENT: ICD-10-PCS | Mod: CPTII,S$GLB,, | Performed by: OTOLARYNGOLOGY

## 2023-04-20 PROCEDURE — 3078F DIAST BP <80 MM HG: CPT | Mod: CPTII,S$GLB,, | Performed by: OTOLARYNGOLOGY

## 2023-04-20 PROCEDURE — 3288F FALL RISK ASSESSMENT DOCD: CPT | Mod: CPTII,S$GLB,, | Performed by: OTOLARYNGOLOGY

## 2023-04-20 PROCEDURE — 3288F PR FALLS RISK ASSESSMENT DOCUMENTED: ICD-10-PCS | Mod: CPTII,S$GLB,, | Performed by: OTOLARYNGOLOGY

## 2023-04-20 PROCEDURE — 1160F PR REVIEW ALL MEDS BY PRESCRIBER/CLIN PHARMACIST DOCUMENTED: ICD-10-PCS | Mod: CPTII,S$GLB,, | Performed by: OTOLARYNGOLOGY

## 2023-04-20 PROCEDURE — 1159F MED LIST DOCD IN RCRD: CPT | Mod: CPTII,S$GLB,, | Performed by: OTOLARYNGOLOGY

## 2023-04-20 PROCEDURE — 3078F PR MOST RECENT DIASTOLIC BLOOD PRESSURE < 80 MM HG: ICD-10-PCS | Mod: CPTII,S$GLB,, | Performed by: OTOLARYNGOLOGY

## 2023-04-20 PROCEDURE — 3008F PR BODY MASS INDEX (BMI) DOCUMENTED: ICD-10-PCS | Mod: CPTII,S$GLB,, | Performed by: OTOLARYNGOLOGY

## 2023-04-20 PROCEDURE — 3008F BODY MASS INDEX DOCD: CPT | Mod: CPTII,S$GLB,, | Performed by: OTOLARYNGOLOGY

## 2023-04-20 PROCEDURE — 1159F PR MEDICATION LIST DOCUMENTED IN MEDICAL RECORD: ICD-10-PCS | Mod: CPTII,S$GLB,, | Performed by: OTOLARYNGOLOGY

## 2023-04-20 PROCEDURE — 1101F PR PT FALLS ASSESS DOC 0-1 FALLS W/OUT INJ PAST YR: ICD-10-PCS | Mod: CPTII,S$GLB,, | Performed by: OTOLARYNGOLOGY

## 2023-04-20 PROCEDURE — 99204 PR OFFICE/OUTPT VISIT, NEW, LEVL IV, 45-59 MIN: ICD-10-PCS | Mod: 25,S$GLB,, | Performed by: OTOLARYNGOLOGY

## 2023-04-20 PROCEDURE — 31231 NASAL/SINUS ENDOSCOPY: ICD-10-PCS | Mod: S$GLB,,, | Performed by: OTOLARYNGOLOGY

## 2023-04-20 PROCEDURE — 1126F AMNT PAIN NOTED NONE PRSNT: CPT | Mod: CPTII,S$GLB,, | Performed by: OTOLARYNGOLOGY

## 2023-04-20 RX ORDER — AMOXICILLIN 500 MG/1
500 TABLET, FILM COATED ORAL EVERY 12 HOURS
Qty: 20 TABLET | Refills: 0 | Status: SHIPPED | OUTPATIENT
Start: 2023-04-20 | End: 2024-03-20

## 2023-04-20 RX ORDER — PREDNISONE 10 MG/1
TABLET ORAL
Qty: 20 TABLET | Refills: 0 | Status: SHIPPED | OUTPATIENT
Start: 2023-04-20 | End: 2023-10-24

## 2023-04-20 NOTE — PROCEDURES
Nasal/sinus endoscopy    Date/Time: 4/20/2023 10:30 AM  Performed by: Rm Botello MD  Authorized by: Rm Botello MD     Consent Done?:  Yes (Verbal)  Anesthesia:     Local anesthetic:  Lidocaine 4% and Matthias-Synephrine 1/2%    Patient tolerance:  Patient tolerated the procedure well with no immediate complications  Nose:     Procedure Performed:  Nasal Endoscopy  External:      No external nasal deformity  Intranasal:      Mucosa polyps     Mucosa ulcers not present     No mucosa lesions present     Enlarged turbinates     No septum gross deformity  Nasopharynx:      No mucosa lesions     Adenoids not present     Posterior choanae patent     Eustachian tube patent     Bilateral grade 2-3 polyps  Diffuse mucopurulent exudate

## 2023-04-20 NOTE — PROGRESS NOTES
Subjective:      Saba is a 68 y.o. male who comes for follow-up of sinusitis. His last visit with me was on 11/21/2019. Now over 5 years status-post endoscopic sinus surgery.  For past year has increasing nasal congestion bilaterally, nearly complete, with marked hyposmia, postnasal drip and thick discharge.  Using floanse and saline rinse without benefit.        %       The patient's medications, allergies, past medical, surgical, social and family histories were reviewed and updated as appropriate.    A detailed review of systems was obtained with pertinent positives as per the above HPI, and otherwise negative.        Objective:     /78 (BP Location: Left arm, Patient Position: Sitting)   Pulse 80   Wt 83.8 kg (184 lb 11.9 oz)   BMI 24.37 kg/m²        Constitutional:   He appears well-developed. He is cooperative.     Head:  Normocephalic.     Nose:  No mucosal edema, rhinorrhea, septal deviation or polyps. No epistaxis. Turbinates normal, no turbinate masses and no turbinate hypertrophy.  Right sinus exhibits no maxillary sinus tenderness and no frontal sinus tenderness. Left sinus exhibits no maxillary sinus tenderness and no frontal sinus tenderness.     Mouth/Throat  Oropharynx clear and moist without lesions or asymmetry. No oropharyngeal exudate or posterior oropharyngeal erythema.     Neck:  No adenopathy. Normal range of motion present.     He has no cervical adenopathy.     Procedure    Nasal endoscopy performed.  See procedure note.        Data Reviewed    WBC (K/uL)   Date Value   09/14/2022 8.29     Eosinophil % (%)   Date Value   09/14/2022 15.3 (H)     Eos # (K/uL)   Date Value   09/14/2022 1.3 (H)     Platelets (K/uL)   Date Value   09/14/2022 233     Glucose (mg/dL)   Date Value   09/14/2022 86     IgE (IU/mL)   Date Value   04/27/2018 102 (H)       Pathology report indicated chronic inflammation with eosinophilia.  Cultures showed P acnes.      Assessment:     1. Chronic rhinitis     2. Other chronic sinusitis    3. Nasal polyposis    4. Moderate persistent asthma without complication         Plan:     Rx amoxicllin 10 day course.  Rx prednisone low dose with taper.  Afterward get CT sinuses.  Mentioned possible revision ESS.  Follow up for test results.

## 2023-05-17 DIAGNOSIS — J45.40 MODERATE PERSISTENT ASTHMA WITHOUT COMPLICATION: ICD-10-CM

## 2023-05-17 NOTE — TELEPHONE ENCOUNTER
Care Due:                  Date            Visit Type   Department     Provider  --------------------------------------------------------------------------------    Last Visit: None Found      None         None Found  Next Visit: None Scheduled  None         None Found                                                            Last  Test          Frequency    Reason                     Performed    Due Date  --------------------------------------------------------------------------------    Office Visit  12 months..  ADVAIR, atorvastatin,      Not Found    Overdue                             montelukast, tamsulosin..    [facility] Embedded Care Due Messages. Reference number: [id number].   5/17/2023 10:08:06 AM JOCELYNET

## 2023-06-14 RX ORDER — FLUTICASONE PROPIONATE AND SALMETEROL XINAFOATE 230; 21 UG/1; UG/1
AEROSOL, METERED RESPIRATORY (INHALATION)
Qty: 12 G | Refills: 3 | Status: SHIPPED | OUTPATIENT
Start: 2023-06-14 | End: 2023-10-24 | Stop reason: SDUPTHER

## 2023-09-29 ENCOUNTER — PATIENT MESSAGE (OUTPATIENT)
Dept: PRIMARY CARE CLINIC | Facility: CLINIC | Age: 68
End: 2023-09-29
Payer: MEDICARE

## 2023-10-20 RX ORDER — TADALAFIL 20 MG/1
20 TABLET ORAL DAILY
Qty: 20 TABLET | Refills: 11 | OUTPATIENT
Start: 2023-10-20 | End: 2024-10-19

## 2023-10-24 ENCOUNTER — LAB VISIT (OUTPATIENT)
Dept: PRIMARY CARE CLINIC | Facility: CLINIC | Age: 68
End: 2023-10-24
Payer: MEDICARE

## 2023-10-24 ENCOUNTER — OFFICE VISIT (OUTPATIENT)
Dept: PRIMARY CARE CLINIC | Facility: CLINIC | Age: 68
End: 2023-10-24
Payer: MEDICARE

## 2023-10-24 VITALS
OXYGEN SATURATION: 95 % | WEIGHT: 191.81 LBS | HEART RATE: 69 BPM | DIASTOLIC BLOOD PRESSURE: 84 MMHG | BODY MASS INDEX: 25.3 KG/M2 | SYSTOLIC BLOOD PRESSURE: 126 MMHG

## 2023-10-24 DIAGNOSIS — R33.8 BENIGN PROSTATIC HYPERPLASIA WITH URINARY RETENTION: ICD-10-CM

## 2023-10-24 DIAGNOSIS — R79.9 ABNORMAL FINDING OF BLOOD CHEMISTRY, UNSPECIFIED: ICD-10-CM

## 2023-10-24 DIAGNOSIS — H53.8 BLURRY VISION: ICD-10-CM

## 2023-10-24 DIAGNOSIS — N52.9 ERECTILE DYSFUNCTION, UNSPECIFIED ERECTILE DYSFUNCTION TYPE: ICD-10-CM

## 2023-10-24 DIAGNOSIS — R97.20 ELEVATED PSA: ICD-10-CM

## 2023-10-24 DIAGNOSIS — Z00.00 ANNUAL PHYSICAL EXAM: Primary | ICD-10-CM

## 2023-10-24 DIAGNOSIS — J45.40 MODERATE PERSISTENT ASTHMA WITHOUT COMPLICATION: ICD-10-CM

## 2023-10-24 DIAGNOSIS — Z13.6 ENCOUNTER FOR LIPID SCREENING FOR CARDIOVASCULAR DISEASE: ICD-10-CM

## 2023-10-24 DIAGNOSIS — R09.81 NASAL CONGESTION: ICD-10-CM

## 2023-10-24 DIAGNOSIS — J34.89 RHINORRHEA: ICD-10-CM

## 2023-10-24 DIAGNOSIS — Z13.220 ENCOUNTER FOR LIPID SCREENING FOR CARDIOVASCULAR DISEASE: ICD-10-CM

## 2023-10-24 DIAGNOSIS — R73.03 PREDIABETES: ICD-10-CM

## 2023-10-24 DIAGNOSIS — Z23 ENCOUNTER FOR ADMINISTRATION OF VACCINE: ICD-10-CM

## 2023-10-24 DIAGNOSIS — Z00.00 ANNUAL PHYSICAL EXAM: ICD-10-CM

## 2023-10-24 DIAGNOSIS — J30.89 ALLERGIC RHINITIS DUE TO OTHER ALLERGIC TRIGGER, UNSPECIFIED SEASONALITY: ICD-10-CM

## 2023-10-24 DIAGNOSIS — N40.1 BENIGN PROSTATIC HYPERPLASIA WITH URINARY RETENTION: ICD-10-CM

## 2023-10-24 LAB
ALBUMIN SERPL BCP-MCNC: 3.8 G/DL (ref 3.5–5.2)
ALP SERPL-CCNC: 99 U/L (ref 55–135)
ALT SERPL W/O P-5'-P-CCNC: 29 U/L (ref 10–44)
ANION GAP SERPL CALC-SCNC: 11 MMOL/L (ref 8–16)
AST SERPL-CCNC: 25 U/L (ref 10–40)
BASOPHILS # BLD AUTO: 0.04 K/UL (ref 0–0.2)
BASOPHILS NFR BLD: 0.6 % (ref 0–1.9)
BILIRUB SERPL-MCNC: 0.5 MG/DL (ref 0.1–1)
BUN SERPL-MCNC: 16 MG/DL (ref 8–23)
CALCIUM SERPL-MCNC: 9.7 MG/DL (ref 8.7–10.5)
CHLORIDE SERPL-SCNC: 106 MMOL/L (ref 95–110)
CHOLEST SERPL-MCNC: 166 MG/DL (ref 120–199)
CHOLEST/HDLC SERPL: 2.4 {RATIO} (ref 2–5)
CO2 SERPL-SCNC: 25 MMOL/L (ref 23–29)
COMPLEXED PSA SERPL-MCNC: 4.1 NG/ML (ref 0–4)
CREAT SERPL-MCNC: 0.8 MG/DL (ref 0.5–1.4)
DIFFERENTIAL METHOD: ABNORMAL
EOSINOPHIL # BLD AUTO: 1.1 K/UL (ref 0–0.5)
EOSINOPHIL NFR BLD: 15.1 % (ref 0–8)
ERYTHROCYTE [DISTWIDTH] IN BLOOD BY AUTOMATED COUNT: 13.8 % (ref 11.5–14.5)
EST. GFR  (NO RACE VARIABLE): >60 ML/MIN/1.73 M^2
ESTIMATED AVG GLUCOSE: 117 MG/DL (ref 68–131)
GLUCOSE SERPL-MCNC: 81 MG/DL (ref 70–110)
HBA1C MFR BLD: 5.7 % (ref 4–5.6)
HCT VFR BLD AUTO: 45.5 % (ref 40–54)
HDLC SERPL-MCNC: 70 MG/DL (ref 40–75)
HDLC SERPL: 42.2 % (ref 20–50)
HGB BLD-MCNC: 14.5 G/DL (ref 14–18)
IMM GRANULOCYTES # BLD AUTO: 0.01 K/UL (ref 0–0.04)
IMM GRANULOCYTES NFR BLD AUTO: 0.1 % (ref 0–0.5)
LDLC SERPL CALC-MCNC: 80.2 MG/DL (ref 63–159)
LYMPHOCYTES # BLD AUTO: 2.2 K/UL (ref 1–4.8)
LYMPHOCYTES NFR BLD: 31.1 % (ref 18–48)
MCH RBC QN AUTO: 28.7 PG (ref 27–31)
MCHC RBC AUTO-ENTMCNC: 31.9 G/DL (ref 32–36)
MCV RBC AUTO: 90 FL (ref 82–98)
MONOCYTES # BLD AUTO: 0.4 K/UL (ref 0.3–1)
MONOCYTES NFR BLD: 6.1 % (ref 4–15)
NEUTROPHILS # BLD AUTO: 3.3 K/UL (ref 1.8–7.7)
NEUTROPHILS NFR BLD: 47 % (ref 38–73)
NONHDLC SERPL-MCNC: 96 MG/DL
NRBC BLD-RTO: 0 /100 WBC
PLATELET # BLD AUTO: 193 K/UL (ref 150–450)
PMV BLD AUTO: 10.9 FL (ref 9.2–12.9)
POTASSIUM SERPL-SCNC: 4.4 MMOL/L (ref 3.5–5.1)
PROT SERPL-MCNC: 7.3 G/DL (ref 6–8.4)
RBC # BLD AUTO: 5.06 M/UL (ref 4.6–6.2)
SODIUM SERPL-SCNC: 142 MMOL/L (ref 136–145)
TRIGL SERPL-MCNC: 79 MG/DL (ref 30–150)
WBC # BLD AUTO: 7.07 K/UL (ref 3.9–12.7)

## 2023-10-24 PROCEDURE — 3074F SYST BP LT 130 MM HG: CPT | Mod: CPTII,S$GLB,, | Performed by: FAMILY MEDICINE

## 2023-10-24 PROCEDURE — G0008 FLU VACCINE - QUADRIVALENT - ADJUVANTED: ICD-10-PCS | Mod: S$GLB,,, | Performed by: FAMILY MEDICINE

## 2023-10-24 PROCEDURE — 99214 PR OFFICE/OUTPT VISIT, EST, LEVL IV, 30-39 MIN: ICD-10-PCS | Mod: 25,S$GLB,, | Performed by: FAMILY MEDICINE

## 2023-10-24 PROCEDURE — 3074F PR MOST RECENT SYSTOLIC BLOOD PRESSURE < 130 MM HG: ICD-10-PCS | Mod: CPTII,S$GLB,, | Performed by: FAMILY MEDICINE

## 2023-10-24 PROCEDURE — 90694 VACC AIIV4 NO PRSRV 0.5ML IM: CPT | Mod: S$GLB,,, | Performed by: FAMILY MEDICINE

## 2023-10-24 PROCEDURE — 84153 ASSAY OF PSA TOTAL: CPT | Performed by: NURSE PRACTITIONER

## 2023-10-24 PROCEDURE — 3288F FALL RISK ASSESSMENT DOCD: CPT | Mod: CPTII,S$GLB,, | Performed by: FAMILY MEDICINE

## 2023-10-24 PROCEDURE — 3079F DIAST BP 80-89 MM HG: CPT | Mod: CPTII,S$GLB,, | Performed by: FAMILY MEDICINE

## 2023-10-24 PROCEDURE — 80061 LIPID PANEL: CPT | Performed by: FAMILY MEDICINE

## 2023-10-24 PROCEDURE — 99999 PR PBB SHADOW E&M-EST. PATIENT-LVL IV: CPT | Mod: PBBFAC,,, | Performed by: FAMILY MEDICINE

## 2023-10-24 PROCEDURE — 99999 PR PBB SHADOW E&M-EST. PATIENT-LVL IV: ICD-10-PCS | Mod: PBBFAC,,, | Performed by: FAMILY MEDICINE

## 2023-10-24 PROCEDURE — 83036 HEMOGLOBIN GLYCOSYLATED A1C: CPT | Performed by: FAMILY MEDICINE

## 2023-10-24 PROCEDURE — 1159F MED LIST DOCD IN RCRD: CPT | Mod: CPTII,S$GLB,, | Performed by: FAMILY MEDICINE

## 2023-10-24 PROCEDURE — 3288F PR FALLS RISK ASSESSMENT DOCUMENTED: ICD-10-PCS | Mod: CPTII,S$GLB,, | Performed by: FAMILY MEDICINE

## 2023-10-24 PROCEDURE — 36415 COLL VENOUS BLD VENIPUNCTURE: CPT | Performed by: FAMILY MEDICINE

## 2023-10-24 PROCEDURE — 3008F PR BODY MASS INDEX (BMI) DOCUMENTED: ICD-10-PCS | Mod: CPTII,S$GLB,, | Performed by: FAMILY MEDICINE

## 2023-10-24 PROCEDURE — 90694 FLU VACCINE - QUADRIVALENT - ADJUVANTED: ICD-10-PCS | Mod: S$GLB,,, | Performed by: FAMILY MEDICINE

## 2023-10-24 PROCEDURE — 1159F PR MEDICATION LIST DOCUMENTED IN MEDICAL RECORD: ICD-10-PCS | Mod: CPTII,S$GLB,, | Performed by: FAMILY MEDICINE

## 2023-10-24 PROCEDURE — 1125F AMNT PAIN NOTED PAIN PRSNT: CPT | Mod: CPTII,S$GLB,, | Performed by: FAMILY MEDICINE

## 2023-10-24 PROCEDURE — 80053 COMPREHEN METABOLIC PANEL: CPT | Performed by: FAMILY MEDICINE

## 2023-10-24 PROCEDURE — 3079F PR MOST RECENT DIASTOLIC BLOOD PRESSURE 80-89 MM HG: ICD-10-PCS | Mod: CPTII,S$GLB,, | Performed by: FAMILY MEDICINE

## 2023-10-24 PROCEDURE — 85025 COMPLETE CBC W/AUTO DIFF WBC: CPT | Performed by: FAMILY MEDICINE

## 2023-10-24 PROCEDURE — 1101F PR PT FALLS ASSESS DOC 0-1 FALLS W/OUT INJ PAST YR: ICD-10-PCS | Mod: CPTII,S$GLB,, | Performed by: FAMILY MEDICINE

## 2023-10-24 PROCEDURE — 99214 OFFICE O/P EST MOD 30 MIN: CPT | Mod: 25,S$GLB,, | Performed by: FAMILY MEDICINE

## 2023-10-24 PROCEDURE — 1160F PR REVIEW ALL MEDS BY PRESCRIBER/CLIN PHARMACIST DOCUMENTED: ICD-10-PCS | Mod: CPTII,S$GLB,, | Performed by: FAMILY MEDICINE

## 2023-10-24 PROCEDURE — 1125F PR PAIN SEVERITY QUANTIFIED, PAIN PRESENT: ICD-10-PCS | Mod: CPTII,S$GLB,, | Performed by: FAMILY MEDICINE

## 2023-10-24 PROCEDURE — 3008F BODY MASS INDEX DOCD: CPT | Mod: CPTII,S$GLB,, | Performed by: FAMILY MEDICINE

## 2023-10-24 PROCEDURE — G0008 ADMIN INFLUENZA VIRUS VAC: HCPCS | Mod: S$GLB,,, | Performed by: FAMILY MEDICINE

## 2023-10-24 PROCEDURE — 1101F PT FALLS ASSESS-DOCD LE1/YR: CPT | Mod: CPTII,S$GLB,, | Performed by: FAMILY MEDICINE

## 2023-10-24 PROCEDURE — 1160F RVW MEDS BY RX/DR IN RCRD: CPT | Mod: CPTII,S$GLB,, | Performed by: FAMILY MEDICINE

## 2023-10-24 RX ORDER — CETIRIZINE HYDROCHLORIDE 10 MG/1
10 TABLET ORAL DAILY
Qty: 90 TABLET | Refills: 3 | Status: SHIPPED | OUTPATIENT
Start: 2023-10-24 | End: 2024-10-23

## 2023-10-24 RX ORDER — FLUTICASONE PROPIONATE 50 MCG
2 SPRAY, SUSPENSION (ML) NASAL 2 TIMES DAILY
Qty: 16 G | Refills: 3 | Status: SHIPPED | OUTPATIENT
Start: 2023-10-24

## 2023-10-24 RX ORDER — AZELASTINE 1 MG/ML
2 SPRAY, METERED NASAL 2 TIMES DAILY
Qty: 30 ML | Refills: 3 | Status: SHIPPED | OUTPATIENT
Start: 2023-10-24 | End: 2024-02-10

## 2023-10-24 RX ORDER — TAMSULOSIN HYDROCHLORIDE 0.4 MG/1
0.4 CAPSULE ORAL DAILY
Qty: 90 CAPSULE | Refills: 3 | Status: SHIPPED | OUTPATIENT
Start: 2023-10-24 | End: 2023-10-26 | Stop reason: SDUPTHER

## 2023-10-24 RX ORDER — FLUTICASONE PROPIONATE AND SALMETEROL XINAFOATE 230; 21 UG/1; UG/1
2 AEROSOL, METERED RESPIRATORY (INHALATION) 2 TIMES DAILY
Qty: 12 G | Refills: 3 | Status: SHIPPED | OUTPATIENT
Start: 2023-10-24 | End: 2023-11-06

## 2023-10-24 NOTE — PROGRESS NOTES
Clinic Note  10/24/2023      Subjective:       Patient ID:  Saba is a 68 y.o. male being seen for an established visit.    Chief Complaint: Knee Pain, Nasal Congestion, and Medication Refill    BPH/ED-being seen by Urology for this.  Has been out of his Flomax, also on finasteride, on Cialis.    Asthma-currently on Advair which has been helping overall with the symptoms.  No recent use of albuterol inhaler     Allergic rhinitis-requesting refills of Flonase, also prescribed Astelin nasal spray, out of his Zyrtec.  Has chronic rhinorrhea, congestion, itchy eyes    Blurry vision-what some glasses which helps, has never seen an eye doctor        Review of Systems   Constitutional:  Negative for chills, fever, malaise/fatigue and weight loss.   HENT:  Negative for congestion, sinus pain and sore throat.    Eyes:  Positive for blurred vision.   Respiratory:  Negative for cough, shortness of breath and wheezing.    Cardiovascular:  Negative for chest pain and palpitations.   Gastrointestinal:  Negative for constipation, diarrhea, nausea and vomiting.   Genitourinary:  Negative for dysuria, frequency and urgency.   Musculoskeletal:  Negative for myalgias.   Skin:  Negative for rash.   Neurological:  Negative for headaches.       Medication List with Changes/Refills   Current Medications    ALBUTEROL (PROAIR HFA) 90 MCG/ACTUATION INHALER    Inhale 2 puffs into the lungs every 6 (six) hours as needed for Wheezing. Rescue    ALCOHOL SWABS (BD ALCOHOL SWABS) PADM    Apply 1 each topically once daily.    ATORVASTATIN (LIPITOR) 40 MG TABLET    Take 1 tablet (40 mg total) by mouth once daily.    BLOOD GLUCOSE CONTROL, LOW (TRUE METRIX LEVEL 1) SOLN    1 each by Misc.(Non-Drug; Combo Route) route once daily.    BLOOD SUGAR DIAGNOSTIC (TRUE METRIX GLUCOSE TEST STRIP) STRP    1 each by Misc.(Non-Drug; Combo Route) route once daily.    BLOOD-GLUCOSE METER (TRUE METRIX GLUCOSE METER) KIT    Check blood sugar daily as needed     COMP-AIR NEBULIZER COMPRESSOR RAFY    use as directed    FINASTERIDE (PROSCAR) 5 MG TABLET    TAKE 1 TABLET(5 MG) BY MOUTH EVERY DAY    IBUPROFEN (ADVIL,MOTRIN) 800 MG TABLET    Take 1 tablet (800 mg total) by mouth every 6 (six) hours as needed for Pain (L shoulder pain).    LANCETS (TRUEPLUS LANCETS) 33 GAUGE MISC    1 lancet by Misc.(Non-Drug; Combo Route) route once daily.    MONTELUKAST (SINGULAIR) 10 MG TABLET    Take 1 tablet (10 mg total) by mouth every evening.    TADALAFIL (CIALIS) 20 MG TAB    Take 1 tablet (20 mg total) by mouth once daily.   Changed and/or Refilled Medications    Modified Medication Previous Medication    AZELASTINE (ASTELIN) 137 MCG (0.1 %) NASAL SPRAY azelastine (ASTELIN) 137 mcg (0.1 %) nasal spray       2 sprays (274 mcg total) by Nasal route 2 (two) times daily.    1 spray (137 mcg total) by Nasal route 2 (two) times daily.    CETIRIZINE (ZYRTEC) 10 MG TABLET cetirizine (ZYRTEC) 10 MG tablet       Take 1 tablet (10 mg total) by mouth once daily.    Take 1 tablet (10 mg total) by mouth once daily.    FLUTICASONE PROPIONATE (FLONASE) 50 MCG/ACTUATION NASAL SPRAY fluticasone propionate (FLONASE) 50 mcg/actuation nasal spray       2 sprays (100 mcg total) by Each Nostril route 2 (two) times a day.    SHAKE LIQUID AND USE 1 SPRAY(50 MCG) IN EACH NOSTRIL EVERY DAY    FLUTICASONE-SALMETEROL 230-21 MCG/DOSE (ADVAIR HFA) 230-21 MCG/ACTUATION HFAA INHALER ADVAIR -21 mcg/actuation HFAA inhaler       Inhale 2 puffs into the lungs 2 (two) times daily. Controller    INHALE 2 PUFFS BY MOUTH TWICE DAILY    TAMSULOSIN (FLOMAX) 0.4 MG CAP tamsulosin (FLOMAX) 0.4 mg Cap       Take 1 capsule (0.4 mg total) by mouth once daily. prostate    Take 1 capsule (0.4 mg total) by mouth once daily.   Discontinued Medications    PREDNISONE (DELTASONE) 10 MG TABLET    Take 2 pills daily x7 days, then 1 pill daily x4 days, then 1/2 pill daily x4 days       Patient Active Problem List   Diagnosis     "Tonsillar mass    Chronic sinusitis    Chronic rhinitis    Moderate persistent asthma without complication    Nasal polyps    Allergic rhinitis    Benign prostatic hyperplasia with urinary retention    Asthma           Objective:      /84   Pulse 69   Wt 87 kg (191 lb 12.8 oz)   SpO2 95%   BMI 25.30 kg/m²   Estimated body mass index is 25.3 kg/m² as calculated from the following:    Height as of 4/10/23: 6' 1" (1.854 m).    Weight as of this encounter: 87 kg (191 lb 12.8 oz).  Physical Exam  Vitals reviewed.   Constitutional:       General: He is not in acute distress.     Appearance: He is not diaphoretic.   HENT:      Head: Normocephalic and atraumatic.      Right Ear: Ear canal and external ear normal. A middle ear effusion is present. There is no impacted cerumen.      Left Ear: Ear canal and external ear normal. A middle ear effusion is present. There is no impacted cerumen.   Eyes:      Conjunctiva/sclera: Conjunctivae normal.   Cardiovascular:      Rate and Rhythm: Normal rate and regular rhythm.      Heart sounds: Normal heart sounds.   Pulmonary:      Effort: Pulmonary effort is normal. No respiratory distress.      Breath sounds: Normal breath sounds. No wheezing.   Abdominal:      General: Bowel sounds are normal.      Palpations: Abdomen is soft.   Musculoskeletal:         General: Normal range of motion.      Cervical back: Normal range of motion.   Skin:     General: Skin is warm and dry.      Findings: No erythema or rash.   Neurological:      Mental Status: He is alert and oriented to person, place, and time.   Psychiatric:         Mood and Affect: Mood and affect normal.         Behavior: Behavior normal.         Thought Content: Thought content normal.         Judgment: Judgment normal.           Assessment and Plan:     1. Moderate persistent asthma without complication  - stable on Advair  - fluticasone-salmeterol 230-21 mcg/dose (ADVAIR HFA) 230-21 mcg/actuation HFAA inhaler; Inhale 2 " puffs into the lungs 2 (two) times daily. Controller  Dispense: 12 g; Refill: 3    2. Allergic rhinitis due to other allergic trigger, unspecified seasonality  - fluticasone propionate (FLONASE) 50 mcg/actuation nasal spray; 2 sprays (100 mcg total) by Each Nostril route 2 (two) times a day.  Dispense: 16 g; Refill: 3  - cetirizine (ZYRTEC) 10 MG tablet; Take 1 tablet (10 mg total) by mouth once daily.  Dispense: 90 tablet; Refill: 3  - azelastine (ASTELIN) 137 mcg (0.1 %) nasal spray; 2 sprays (274 mcg total) by Nasal route 2 (two) times daily.  Dispense: 30 mL; Refill: 3    5. Benign prostatic hyperplasia with urinary retention  - continue Flomax and finasteride  - tamsulosin (FLOMAX) 0.4 mg Cap; Take 1 capsule (0.4 mg total) by mouth once daily. prostate  Dispense: 90 capsule; Refill: 3    6. Annual physical exam  - CBC Auto Differential; Future  - Comprehensive Metabolic Panel; Future  - Lipid Panel; Future  - Hemoglobin A1C; Future    7. Encounter for lipid screening for cardiovascular disease  - Lipid Panel; Future    8. Prediabetes  - Hemoglobin A1C; Future    9. Abnormal finding of blood chemistry, unspecified  - CBC Auto Differential; Future    10. Encounter for administration of vaccine  - Influenza (FLUAD) - Quadrivalent (Adjuvanted) *Preferred* (65+) (PF)    11. Blurry vision  - Ambulatory referral/consult to Optometry; Future    12. Erectile dysfunction, unspecified erectile dysfunction type  - due for refills of Cialis, will get this through Urology        Follow Up:   No follow-ups on file.    Other Orders Placed This Visit:  Orders Placed This Encounter   Procedures    Influenza (FLUAD) - Quadrivalent (Adjuvanted) *Preferred* (65+) (PF)    CBC Auto Differential    Comprehensive Metabolic Panel    Lipid Panel    Hemoglobin A1C    Ambulatory referral/consult to Optometry         Drew Avendano MD        This note is dictated on M*Modal word recognition program.  There are word recognition mistakes that  are occasionally missed on review.

## 2023-10-25 ENCOUNTER — TELEPHONE (OUTPATIENT)
Dept: PRIMARY CARE CLINIC | Facility: CLINIC | Age: 68
End: 2023-10-25
Payer: MEDICARE

## 2023-10-25 ENCOUNTER — OFFICE VISIT (OUTPATIENT)
Dept: OPTOMETRY | Facility: CLINIC | Age: 68
End: 2023-10-25
Payer: MEDICARE

## 2023-10-25 DIAGNOSIS — H53.8 BLURRY VISION: ICD-10-CM

## 2023-10-25 DIAGNOSIS — H52.4 PRESBYOPIA: ICD-10-CM

## 2023-10-25 DIAGNOSIS — Z13.5 GLAUCOMA SCREENING: ICD-10-CM

## 2023-10-25 DIAGNOSIS — H25.13 NUCLEAR SCLEROSIS, BILATERAL: Primary | ICD-10-CM

## 2023-10-25 DIAGNOSIS — H04.123 DRY EYES, BILATERAL: ICD-10-CM

## 2023-10-25 PROCEDURE — 1126F PR PAIN SEVERITY QUANTIFIED, NO PAIN PRESENT: ICD-10-PCS | Mod: CPTII,S$GLB,, | Performed by: OPTOMETRIST

## 2023-10-25 PROCEDURE — 99999 PR PBB SHADOW E&M-EST. PATIENT-LVL III: ICD-10-PCS | Mod: PBBFAC,,, | Performed by: OPTOMETRIST

## 2023-10-25 PROCEDURE — 99999 PR PBB SHADOW E&M-EST. PATIENT-LVL III: CPT | Mod: PBBFAC,,, | Performed by: OPTOMETRIST

## 2023-10-25 PROCEDURE — 3044F PR MOST RECENT HEMOGLOBIN A1C LEVEL <7.0%: ICD-10-PCS | Mod: CPTII,S$GLB,, | Performed by: OPTOMETRIST

## 2023-10-25 PROCEDURE — 3288F PR FALLS RISK ASSESSMENT DOCUMENTED: ICD-10-PCS | Mod: CPTII,S$GLB,, | Performed by: OPTOMETRIST

## 2023-10-25 PROCEDURE — 1126F AMNT PAIN NOTED NONE PRSNT: CPT | Mod: CPTII,S$GLB,, | Performed by: OPTOMETRIST

## 2023-10-25 PROCEDURE — 92014 PR EYE EXAM, EST PATIENT,COMPREHESV: ICD-10-PCS | Mod: S$GLB,,, | Performed by: OPTOMETRIST

## 2023-10-25 PROCEDURE — 3288F FALL RISK ASSESSMENT DOCD: CPT | Mod: CPTII,S$GLB,, | Performed by: OPTOMETRIST

## 2023-10-25 PROCEDURE — 1159F PR MEDICATION LIST DOCUMENTED IN MEDICAL RECORD: ICD-10-PCS | Mod: CPTII,S$GLB,, | Performed by: OPTOMETRIST

## 2023-10-25 PROCEDURE — 92015 PR REFRACTION: ICD-10-PCS | Mod: S$GLB,,, | Performed by: OPTOMETRIST

## 2023-10-25 PROCEDURE — 1159F MED LIST DOCD IN RCRD: CPT | Mod: CPTII,S$GLB,, | Performed by: OPTOMETRIST

## 2023-10-25 PROCEDURE — 1160F PR REVIEW ALL MEDS BY PRESCRIBER/CLIN PHARMACIST DOCUMENTED: ICD-10-PCS | Mod: CPTII,S$GLB,, | Performed by: OPTOMETRIST

## 2023-10-25 PROCEDURE — 1160F RVW MEDS BY RX/DR IN RCRD: CPT | Mod: CPTII,S$GLB,, | Performed by: OPTOMETRIST

## 2023-10-25 PROCEDURE — 1101F PT FALLS ASSESS-DOCD LE1/YR: CPT | Mod: CPTII,S$GLB,, | Performed by: OPTOMETRIST

## 2023-10-25 PROCEDURE — 92014 COMPRE OPH EXAM EST PT 1/>: CPT | Mod: S$GLB,,, | Performed by: OPTOMETRIST

## 2023-10-25 PROCEDURE — 3044F HG A1C LEVEL LT 7.0%: CPT | Mod: CPTII,S$GLB,, | Performed by: OPTOMETRIST

## 2023-10-25 PROCEDURE — 1101F PR PT FALLS ASSESS DOC 0-1 FALLS W/OUT INJ PAST YR: ICD-10-PCS | Mod: CPTII,S$GLB,, | Performed by: OPTOMETRIST

## 2023-10-25 PROCEDURE — 92015 DETERMINE REFRACTIVE STATE: CPT | Mod: S$GLB,,, | Performed by: OPTOMETRIST

## 2023-10-25 NOTE — TELEPHONE ENCOUNTER
----- Message from Drew Avendano MD sent at 10/25/2023  1:35 PM CDT -----  Please call patient and let him know that his blood counts look good and he is not anemic.  His prediabetes is stable.  His electrolytes and kidney and liver function look good.      His cholesterol level is a little high with a 9% chance of a stroke or heart attack in the next 10 years.  I will want him to continue taking his atorvastatin to help reduce his risk.  However, his cholesterol levels are better compared to last year.

## 2023-10-25 NOTE — PROGRESS NOTES
HPI    69 Y/o male is here for routine eye exam with C/o pt states OU are burning   and feels a gritty feeling in OU, pt states 3 weeks ago he believes he was   contacted with poison ivy pt states he has small bump in inside corners of   OU Pt also say's OU has been tearing OD a little more   No f/f    Eye med: no gtt   Last edited by Leny Morales MA on 10/25/2023  7:48 AM.            Assessment /Plan     For exam results, see Encounter Report.    Nuclear sclerosis, bilateral    Blurry vision  -     Ambulatory referral/consult to Optometry    Glaucoma screening    Presbyopia    Dry eyes, bilateral      Cat OD>OS--pt wishes surgery  THOMAS--advised SYS COMP Ats prn  Pt noting LL puncta--reassured they are normal    PLAN:    Surgical consult--Dr Mosley                   
The patient is a 88y Male complaining of back pain general.

## 2023-10-26 ENCOUNTER — OFFICE VISIT (OUTPATIENT)
Dept: UROLOGY | Facility: CLINIC | Age: 68
End: 2023-10-26
Payer: MEDICARE

## 2023-10-26 VITALS
HEART RATE: 94 BPM | DIASTOLIC BLOOD PRESSURE: 77 MMHG | BODY MASS INDEX: 25.31 KG/M2 | SYSTOLIC BLOOD PRESSURE: 124 MMHG | WEIGHT: 191 LBS | HEIGHT: 73 IN

## 2023-10-26 DIAGNOSIS — N52.01 ERECTILE DYSFUNCTION DUE TO ARTERIAL INSUFFICIENCY: ICD-10-CM

## 2023-10-26 DIAGNOSIS — R97.20 ELEVATED PROSTATE SPECIFIC ANTIGEN (PSA): Primary | ICD-10-CM

## 2023-10-26 DIAGNOSIS — N40.1 BPH WITH URINARY OBSTRUCTION: ICD-10-CM

## 2023-10-26 DIAGNOSIS — N13.8 BPH WITH URINARY OBSTRUCTION: ICD-10-CM

## 2023-10-26 DIAGNOSIS — E78.49 OTHER HYPERLIPIDEMIA: ICD-10-CM

## 2023-10-26 PROBLEM — E11.8 DM TYPE 2 CAUSING COMPLICATION: Status: RESOLVED | Noted: 2023-10-26 | Resolved: 2023-10-26

## 2023-10-26 PROBLEM — E11.8 DM TYPE 2 CAUSING COMPLICATION: Status: ACTIVE | Noted: 2023-10-26

## 2023-10-26 PROCEDURE — 3288F FALL RISK ASSESSMENT DOCD: CPT | Mod: CPTII,S$GLB,, | Performed by: UROLOGY

## 2023-10-26 PROCEDURE — 1101F PT FALLS ASSESS-DOCD LE1/YR: CPT | Mod: CPTII,S$GLB,, | Performed by: UROLOGY

## 2023-10-26 PROCEDURE — 1126F AMNT PAIN NOTED NONE PRSNT: CPT | Mod: CPTII,S$GLB,, | Performed by: UROLOGY

## 2023-10-26 PROCEDURE — 3008F BODY MASS INDEX DOCD: CPT | Mod: CPTII,S$GLB,, | Performed by: UROLOGY

## 2023-10-26 PROCEDURE — 99214 OFFICE O/P EST MOD 30 MIN: CPT | Mod: S$GLB,,, | Performed by: UROLOGY

## 2023-10-26 PROCEDURE — 3044F PR MOST RECENT HEMOGLOBIN A1C LEVEL <7.0%: ICD-10-PCS | Mod: CPTII,S$GLB,, | Performed by: UROLOGY

## 2023-10-26 PROCEDURE — 3074F SYST BP LT 130 MM HG: CPT | Mod: CPTII,S$GLB,, | Performed by: UROLOGY

## 2023-10-26 PROCEDURE — 3074F PR MOST RECENT SYSTOLIC BLOOD PRESSURE < 130 MM HG: ICD-10-PCS | Mod: CPTII,S$GLB,, | Performed by: UROLOGY

## 2023-10-26 PROCEDURE — 3044F HG A1C LEVEL LT 7.0%: CPT | Mod: CPTII,S$GLB,, | Performed by: UROLOGY

## 2023-10-26 PROCEDURE — 1159F PR MEDICATION LIST DOCUMENTED IN MEDICAL RECORD: ICD-10-PCS | Mod: CPTII,S$GLB,, | Performed by: UROLOGY

## 2023-10-26 PROCEDURE — 1101F PR PT FALLS ASSESS DOC 0-1 FALLS W/OUT INJ PAST YR: ICD-10-PCS | Mod: CPTII,S$GLB,, | Performed by: UROLOGY

## 2023-10-26 PROCEDURE — 99214 PR OFFICE/OUTPT VISIT, EST, LEVL IV, 30-39 MIN: ICD-10-PCS | Mod: S$GLB,,, | Performed by: UROLOGY

## 2023-10-26 PROCEDURE — 1160F PR REVIEW ALL MEDS BY PRESCRIBER/CLIN PHARMACIST DOCUMENTED: ICD-10-PCS | Mod: CPTII,S$GLB,, | Performed by: UROLOGY

## 2023-10-26 PROCEDURE — 3008F PR BODY MASS INDEX (BMI) DOCUMENTED: ICD-10-PCS | Mod: CPTII,S$GLB,, | Performed by: UROLOGY

## 2023-10-26 PROCEDURE — 3078F DIAST BP <80 MM HG: CPT | Mod: CPTII,S$GLB,, | Performed by: UROLOGY

## 2023-10-26 PROCEDURE — 99999 PR PBB SHADOW E&M-EST. PATIENT-LVL IV: ICD-10-PCS | Mod: PBBFAC,,, | Performed by: UROLOGY

## 2023-10-26 PROCEDURE — 3078F PR MOST RECENT DIASTOLIC BLOOD PRESSURE < 80 MM HG: ICD-10-PCS | Mod: CPTII,S$GLB,, | Performed by: UROLOGY

## 2023-10-26 PROCEDURE — 1126F PR PAIN SEVERITY QUANTIFIED, NO PAIN PRESENT: ICD-10-PCS | Mod: CPTII,S$GLB,, | Performed by: UROLOGY

## 2023-10-26 PROCEDURE — 1159F MED LIST DOCD IN RCRD: CPT | Mod: CPTII,S$GLB,, | Performed by: UROLOGY

## 2023-10-26 PROCEDURE — 1160F RVW MEDS BY RX/DR IN RCRD: CPT | Mod: CPTII,S$GLB,, | Performed by: UROLOGY

## 2023-10-26 PROCEDURE — 3288F PR FALLS RISK ASSESSMENT DOCUMENTED: ICD-10-PCS | Mod: CPTII,S$GLB,, | Performed by: UROLOGY

## 2023-10-26 PROCEDURE — 99999 PR PBB SHADOW E&M-EST. PATIENT-LVL IV: CPT | Mod: PBBFAC,,, | Performed by: UROLOGY

## 2023-10-26 RX ORDER — PAPAVERINE HYDROCHLORIDE 30 MG/ML
INJECTION INTRAMUSCULAR; INTRAVENOUS
Qty: 5 ML | Refills: 0 | Status: SHIPPED | OUTPATIENT
Start: 2023-10-26 | End: 2023-12-01 | Stop reason: ALTCHOICE

## 2023-10-26 RX ORDER — TAMSULOSIN HYDROCHLORIDE 0.4 MG/1
0.4 CAPSULE ORAL DAILY
Qty: 90 CAPSULE | Refills: 3 | Status: SHIPPED | OUTPATIENT
Start: 2023-10-26

## 2023-10-26 RX ORDER — FINASTERIDE 5 MG/1
5 TABLET, FILM COATED ORAL DAILY
Qty: 90 TABLET | Refills: 3 | Status: SHIPPED | OUTPATIENT
Start: 2023-10-26

## 2023-10-26 NOTE — PROGRESS NOTES
CHIEF COMPLAINT:    Mr. Hansen is a 68 y.o. male presenting with LUTS.    PRESENTING ILLNESS:    Saba Hansen is a 68 y.o. male who c/o LUTS.  + intermittency, + urgency.  Is on flomax and proscar.  Is pleased with how he voids.    He has ED.  He's tried and failed Cialis.  His T is normal.    He has an elevated PSA.   Is s/p a TRUS bx. 4/14/21 when PSA 9.1.  This was prior to proscar.    REVIEW OF SYSTEMS:    Saba Hansen denies headache, blurred vision, fever, nausea, vomiting, chills, abdominal pain, chest pain, sore throat, bleeding per rectum, cough, SOB, recent loss of consciousness, recent mental status changes, seizures, dizziness, or upper or lower extremity weakness.    MERLINE  1. 2  2. 2  3. 2  4. 3  5. 3      PATIENT HISTORY:    Past Medical History:   Diagnosis Date    Allergy     Asthma     Male erectile dysfunction, unspecified        Past Surgical History:   Procedure Laterality Date    AORTOPEXY W/ MLB      NASAL SINUS SURGERY      TONSILLECTOMY         Family History   Problem Relation Age of Onset    Hypertension Mother     No Known Problems Father     Allergic rhinitis Neg Hx     Allergies Neg Hx     Angioedema Neg Hx     Asthma Neg Hx     Atopy Neg Hx     Rhinitis Neg Hx     Urticaria Neg Hx     Immunodeficiency Neg Hx     Eczema Neg Hx        Social History     Socioeconomic History    Marital status: Single   Tobacco Use    Smoking status: Never    Smokeless tobacco: Never   Substance and Sexual Activity    Alcohol use: No     Comment: rarely    Drug use: No    Sexual activity: Yes       Allergies:  Iodinated contrast media    Medications:    Current Outpatient Medications:     albuterol (PROAIR HFA) 90 mcg/actuation inhaler, Inhale 2 puffs into the lungs every 6 (six) hours as needed for Wheezing. Rescue, Disp: 18 g, Rfl: 2    alcohol swabs (BD ALCOHOL SWABS) PadM, Apply 1 each topically once daily., Disp: 100 each, Rfl: 0    atorvastatin (LIPITOR) 40 MG tablet, Take 1 tablet (40 mg total)  by mouth once daily., Disp: 90 tablet, Rfl: 2    azelastine (ASTELIN) 137 mcg (0.1 %) nasal spray, 2 sprays (274 mcg total) by Nasal route 2 (two) times daily., Disp: 30 mL, Rfl: 3    blood glucose control, low (TRUE METRIX LEVEL 1) Soln, 1 each by Misc.(Non-Drug; Combo Route) route once daily., Disp: 1 each, Rfl: 0    blood sugar diagnostic (TRUE METRIX GLUCOSE TEST STRIP) Strp, 1 each by Misc.(Non-Drug; Combo Route) route once daily., Disp: 100 strip, Rfl: 0    blood-glucose meter (TRUE METRIX GLUCOSE METER) kit, Check blood sugar daily as needed, Disp: 1 each, Rfl: 0    cetirizine (ZYRTEC) 10 MG tablet, Take 1 tablet (10 mg total) by mouth once daily., Disp: 90 tablet, Rfl: 3    COMP-AIR NEBULIZER COMPRESSOR Kary, use as directed, Disp: , Rfl:     finasteride (PROSCAR) 5 mg tablet, TAKE 1 TABLET(5 MG) BY MOUTH EVERY DAY, Disp: 30 tablet, Rfl: 11    fluticasone propionate (FLONASE) 50 mcg/actuation nasal spray, 2 sprays (100 mcg total) by Each Nostril route 2 (two) times a day., Disp: 16 g, Rfl: 3    fluticasone-salmeterol 230-21 mcg/dose (ADVAIR HFA) 230-21 mcg/actuation HFAA inhaler, Inhale 2 puffs into the lungs 2 (two) times daily. Controller, Disp: 12 g, Rfl: 3    ibuprofen (ADVIL,MOTRIN) 800 MG tablet, Take 1 tablet (800 mg total) by mouth every 6 (six) hours as needed for Pain (L shoulder pain)., Disp: 12 tablet, Rfl: 0    lancets (TRUEPLUS LANCETS) 33 gauge Misc, 1 lancet by Misc.(Non-Drug; Combo Route) route once daily., Disp: 100 each, Rfl: 0    montelukast (SINGULAIR) 10 mg tablet, Take 1 tablet (10 mg total) by mouth every evening., Disp: 90 tablet, Rfl: 0    tamsulosin (FLOMAX) 0.4 mg Cap, Take 1 capsule (0.4 mg total) by mouth once daily. prostate, Disp: 90 capsule, Rfl: 3    tadalafiL (CIALIS) 20 MG Tab, Take 1 tablet (20 mg total) by mouth once daily., Disp: 20 tablet, Rfl: 11    PHYSICAL EXAMINATION:    The patient generally appears in good health, is appropriately interactive, and is in no  apparent distress.     Eyes: anicteric sclerae, moist conjunctivae; no lid-lag; PERRLA     HENT: Atraumatic; oropharynx clear with moist mucous membranes and no mucosal ulcerations;normal hard and soft palate.  No evidence of lymphadenopathy.    Neck: Trachea midline.  No thyromegaly.    Skin: No lesions.    Mental: Cooperative with normal affect.  Is oriented to time, place, and person.    Neuro: Grossly intact.    Chest: Normal inspiratory effort.   No accessory muscles.  No audible wheezes.  Respirations symmetric on inspiration and expiration.    Heart: Regular rhythm.      Abdomen:  Soft, non-tender. No masses or organomegaly. Bladder is not palpable. No evidence of flank discomfort. No evidence of inguinal hernia.    Genitourinary: The penis is not circumcised with no evidence of plaques or induration. The urethral meatus is normal. The testes, epididymides, and cord structures are normal in size and contour bilaterally. The scrotum is normal in size and contour.    Normal anal sphincter tone. No rectal mass.    The prostate is 50 g. Normal landmarks. Lateral sulci. Median furrow intact.  No nodularity or induration. Seminal vesicles are normal.    Extremities: No clubbing, cyanosis, or edema      LABS:    On proscar since 2021  Lab Results   Component Value Date    PSA 4.4 (H) 09/14/2022    PSA 5.1 (H) 02/01/2021    PSADIAG 4.1 (H) 10/24/2023    PSADIAG 4.1 (H) 04/03/2023    PSADIAG 4.0 09/29/2022       IMPRESSION:    Encounter Diagnoses   Name Primary?    Elevated prostate specific antigen (PSA) Yes    BPH with urinary obstruction     Erectile dysfunction due to arterial insufficiency     Other hyperlipidemia    DM, stable  Hyperlipidemia, stable      PLAN:    1. Discussed options for his LUTS. Continue flomax and proscar.  Refilled  2. Discussed options for his ED (affected by above comorbidities).  He'd like [facility]. Side effects discussed.  A new Rx was given.  Will set up for teaching.  3. RTC 6 months with  a PSA.    Copy to:

## 2023-11-02 ENCOUNTER — TELEPHONE (OUTPATIENT)
Dept: PRIMARY CARE CLINIC | Facility: CLINIC | Age: 68
End: 2023-11-02
Payer: MEDICARE

## 2023-11-02 NOTE — TELEPHONE ENCOUNTER
----- Message from Kalli Corcoran MA sent at 11/2/2023  9:36 AM CDT -----  Contact: [phone]    ----- Message -----  From: Cam Lizama  Sent: 10/31/2023   5:00 PM CDT  To: Kalli Corcoran MA      ----- Message -----  From: Lauren Chen  Sent: 10/31/2023  11:42 AM CDT  To: Romana Jordan Staff    May with [facility] is calling for the prior auth for [id number] please advise    Ref [id number]

## 2023-11-02 NOTE — TELEPHONE ENCOUNTER
Spoke with [facility] they were letting us know that the Advair was dispensed as name brand without a PA. Pharmacy will fill for patient.

## 2023-11-06 DIAGNOSIS — J45.40 MODERATE PERSISTENT ASTHMA WITHOUT COMPLICATION: ICD-10-CM

## 2023-11-06 RX ORDER — FLUTICASONE PROPIONATE AND SALMETEROL XINAFOATE 230; 21 UG/1; UG/1
2 AEROSOL, METERED RESPIRATORY (INHALATION) 2 TIMES DAILY
Qty: 36 G | Refills: 3 | Status: SHIPPED | OUTPATIENT
Start: 2023-11-06

## 2023-11-06 NOTE — TELEPHONE ENCOUNTER
No care due was identified.  [facility] Embedded Care Due Messages. Reference number: [id number].   11/06/2023 5:11:51 PM CST

## 2023-11-07 NOTE — TELEPHONE ENCOUNTER
Refill Decision Note   Saba Hansen  is requesting a refill authorization.  Brief Assessment and Rationale for Refill:  Approve     Medication Therapy Plan:         Comments:     Note composed:6:22 PM 11/06/2023

## 2023-12-01 ENCOUNTER — OFFICE VISIT (OUTPATIENT)
Dept: UROLOGY | Facility: CLINIC | Age: 68
End: 2023-12-01
Payer: MEDICARE

## 2023-12-01 VITALS
HEIGHT: 73 IN | DIASTOLIC BLOOD PRESSURE: 76 MMHG | BODY MASS INDEX: 25.25 KG/M2 | HEART RATE: 98 BPM | SYSTOLIC BLOOD PRESSURE: 135 MMHG | WEIGHT: 190.5 LBS

## 2023-12-01 DIAGNOSIS — N40.1 BPH WITH URINARY OBSTRUCTION: ICD-10-CM

## 2023-12-01 DIAGNOSIS — N52.01 ERECTILE DYSFUNCTION DUE TO ARTERIAL INSUFFICIENCY: Primary | ICD-10-CM

## 2023-12-01 DIAGNOSIS — N13.8 BPH WITH URINARY OBSTRUCTION: ICD-10-CM

## 2023-12-01 PROCEDURE — 3078F PR MOST RECENT DIASTOLIC BLOOD PRESSURE < 80 MM HG: ICD-10-PCS | Mod: CPTII,S$GLB,, | Performed by: NURSE PRACTITIONER

## 2023-12-01 PROCEDURE — 1160F RVW MEDS BY RX/DR IN RCRD: CPT | Mod: CPTII,S$GLB,, | Performed by: NURSE PRACTITIONER

## 2023-12-01 PROCEDURE — 3075F PR MOST RECENT SYSTOLIC BLOOD PRESS GE 130-139MM HG: ICD-10-PCS | Mod: CPTII,S$GLB,, | Performed by: NURSE PRACTITIONER

## 2023-12-01 PROCEDURE — 1159F MED LIST DOCD IN RCRD: CPT | Mod: CPTII,S$GLB,, | Performed by: NURSE PRACTITIONER

## 2023-12-01 PROCEDURE — 3288F PR FALLS RISK ASSESSMENT DOCUMENTED: ICD-10-PCS | Mod: CPTII,S$GLB,, | Performed by: NURSE PRACTITIONER

## 2023-12-01 PROCEDURE — 3044F PR MOST RECENT HEMOGLOBIN A1C LEVEL <7.0%: ICD-10-PCS | Mod: CPTII,S$GLB,, | Performed by: NURSE PRACTITIONER

## 2023-12-01 PROCEDURE — 1159F PR MEDICATION LIST DOCUMENTED IN MEDICAL RECORD: ICD-10-PCS | Mod: CPTII,S$GLB,, | Performed by: NURSE PRACTITIONER

## 2023-12-01 PROCEDURE — 99999 PR PBB SHADOW E&M-EST. PATIENT-LVL IV: ICD-10-PCS | Mod: PBBFAC,,, | Performed by: NURSE PRACTITIONER

## 2023-12-01 PROCEDURE — 3044F HG A1C LEVEL LT 7.0%: CPT | Mod: CPTII,S$GLB,, | Performed by: NURSE PRACTITIONER

## 2023-12-01 PROCEDURE — 1101F PT FALLS ASSESS-DOCD LE1/YR: CPT | Mod: CPTII,S$GLB,, | Performed by: NURSE PRACTITIONER

## 2023-12-01 PROCEDURE — 1126F PR PAIN SEVERITY QUANTIFIED, NO PAIN PRESENT: ICD-10-PCS | Mod: CPTII,S$GLB,, | Performed by: NURSE PRACTITIONER

## 2023-12-01 PROCEDURE — 1126F AMNT PAIN NOTED NONE PRSNT: CPT | Mod: CPTII,S$GLB,, | Performed by: NURSE PRACTITIONER

## 2023-12-01 PROCEDURE — 3008F BODY MASS INDEX DOCD: CPT | Mod: CPTII,S$GLB,, | Performed by: NURSE PRACTITIONER

## 2023-12-01 PROCEDURE — 3288F FALL RISK ASSESSMENT DOCD: CPT | Mod: CPTII,S$GLB,, | Performed by: NURSE PRACTITIONER

## 2023-12-01 PROCEDURE — 3008F PR BODY MASS INDEX (BMI) DOCUMENTED: ICD-10-PCS | Mod: CPTII,S$GLB,, | Performed by: NURSE PRACTITIONER

## 2023-12-01 PROCEDURE — 99215 OFFICE O/P EST HI 40 MIN: CPT | Mod: S$GLB,,, | Performed by: NURSE PRACTITIONER

## 2023-12-01 PROCEDURE — 3078F DIAST BP <80 MM HG: CPT | Mod: CPTII,S$GLB,, | Performed by: NURSE PRACTITIONER

## 2023-12-01 PROCEDURE — 99215 PR OFFICE/OUTPT VISIT, EST, LEVL V, 40-54 MIN: ICD-10-PCS | Mod: S$GLB,,, | Performed by: NURSE PRACTITIONER

## 2023-12-01 PROCEDURE — 1160F PR REVIEW ALL MEDS BY PRESCRIBER/CLIN PHARMACIST DOCUMENTED: ICD-10-PCS | Mod: CPTII,S$GLB,, | Performed by: NURSE PRACTITIONER

## 2023-12-01 PROCEDURE — 1101F PR PT FALLS ASSESS DOC 0-1 FALLS W/OUT INJ PAST YR: ICD-10-PCS | Mod: CPTII,S$GLB,, | Performed by: NURSE PRACTITIONER

## 2023-12-01 PROCEDURE — 3075F SYST BP GE 130 - 139MM HG: CPT | Mod: CPTII,S$GLB,, | Performed by: NURSE PRACTITIONER

## 2023-12-01 PROCEDURE — 99999 PR PBB SHADOW E&M-EST. PATIENT-LVL IV: CPT | Mod: PBBFAC,,, | Performed by: NURSE PRACTITIONER

## 2023-12-01 RX ORDER — PAPAVERINE HYDROCHLORIDE 30 MG/ML
INJECTION INTRAMUSCULAR; INTRAVENOUS
Qty: 10 ML | Refills: 12 | Status: SHIPPED | OUTPATIENT
Start: 2023-12-01

## 2023-12-01 NOTE — PROGRESS NOTES
CHIEF COMPLAINT:    Saba Hansen is a 68 y.o. male presents today for ED.     HISTORY OF PRESENTING ILLINESS:    Saba Hansen is a 68 y.o. male who is an established patient in our clinic . He has a history of BPH with LUTS. He has an elevated PSA.   Is s/p a TRUS bx. 4/14/21 when PSA 9.1.  This was prior to proscar  Is on flomax and proscar.  Is pleased with how he voids.  He has ED.  He's tried and failed Cialis.  His T is normal.  Last clinic visit was 10/26/2023 with Dr. Samano    He is here today for [facility] education and 1st injection.   Brought in his own medication from [facility]      10/24/2023 Hgb A1c was 5.7        REVIEW OF SYSTEMS:  Review of Systems   Constitutional: Negative.  Negative for chills and fever.   Eyes:  Negative for double vision.   Respiratory:  Negative for cough and shortness of breath.    Cardiovascular:  Negative for chest pain.   Gastrointestinal:  Negative for abdominal pain, constipation, diarrhea, nausea and vomiting.   Genitourinary:  Positive for frequency. Negative for dysuria, flank pain and hematuria.   Neurological:  Negative for dizziness and seizures.   Endo/Heme/Allergies:  Negative for polydipsia.         PATIENT HISTORY:    Past Medical History:   Diagnosis Date    Allergy     Asthma     Male erectile dysfunction, unspecified        Past Surgical History:   Procedure Laterality Date    AORTOPEXY W/ MLB      NASAL SINUS SURGERY      TONSILLECTOMY         Family History   Problem Relation Age of Onset    Hypertension Mother     No Known Problems Father     Allergic rhinitis Neg Hx     Allergies Neg Hx     Angioedema Neg Hx     Asthma Neg Hx     Atopy Neg Hx     Rhinitis Neg Hx     Urticaria Neg Hx     Immunodeficiency Neg Hx     Eczema Neg Hx        Social History     Socioeconomic History    Marital status: Single   Tobacco Use    Smoking status: Never    Smokeless tobacco: Never   Substance and Sexual Activity    Alcohol use: No     Comment: rarely    Drug use: No     Sexual activity: Yes       Allergies:  Iodinated contrast media    Medications:    Current Outpatient Medications:     ADVAIR -21 mcg/actuation HFAA inhaler, INHALE 2 PUFFS BY MOUTH TWICE DAILY, Disp: 36 g, Rfl: 3    albuterol (PROAIR HFA) 90 mcg/actuation inhaler, Inhale 2 puffs into the lungs every 6 (six) hours as needed for Wheezing. Rescue, Disp: 18 g, Rfl: 2    alcohol swabs (BD ALCOHOL SWABS) PadM, Apply 1 each topically once daily., Disp: 100 each, Rfl: 0    atorvastatin (LIPITOR) 40 MG tablet, Take 1 tablet (40 mg total) by mouth once daily., Disp: 90 tablet, Rfl: 2    azelastine (ASTELIN) 137 mcg (0.1 %) nasal spray, 2 sprays (274 mcg total) by Nasal route 2 (two) times daily., Disp: 30 mL, Rfl: 3    blood glucose control, low (TRUE METRIX LEVEL 1) Soln, 1 each by Misc.(Non-Drug; Combo Route) route once daily., Disp: 1 each, Rfl: 0    blood sugar diagnostic (TRUE METRIX GLUCOSE TEST STRIP) Strp, 1 each by Misc.(Non-Drug; Combo Route) route once daily., Disp: 100 strip, Rfl: 0    blood-glucose meter (TRUE METRIX GLUCOSE METER) kit, Check blood sugar daily as needed, Disp: 1 each, Rfl: 0    cetirizine (ZYRTEC) 10 MG tablet, Take 1 tablet (10 mg total) by mouth once daily., Disp: 90 tablet, Rfl: 3    COMP-AIR NEBULIZER COMPRESSOR Kary, use as directed, Disp: , Rfl:     finasteride (PROSCAR) 5 mg tablet, Take 1 tablet (5 mg total) by mouth once daily., Disp: 90 tablet, Rfl: 3    fluticasone propionate (FLONASE) 50 mcg/actuation nasal spray, 2 sprays (100 mcg total) by Each Nostril route 2 (two) times a day., Disp: 16 g, Rfl: 3    ibuprofen (ADVIL,MOTRIN) 800 MG tablet, Take 1 tablet (800 mg total) by mouth every 6 (six) hours as needed for Pain (L shoulder pain)., Disp: 12 tablet, Rfl: 0    lancets (TRUEPLUS LANCETS) 33 gauge Misc, 1 lancet by Misc.(Non-Drug; Combo Route) route once daily., Disp: 100 each, Rfl: 0    montelukast (SINGULAIR) 10 mg tablet, Take 1 tablet (10 mg total) by mouth every  evening., Disp: 90 tablet, Rfl: 0    tadalafiL (CIALIS) 20 MG Tab, Take 1 tablet (20 mg total) by mouth once daily., Disp: 20 tablet, Rfl: 11    tamsulosin (FLOMAX) 0.4 mg Cap, Take 1 capsule (0.4 mg total) by mouth once daily. prostate, Disp: 90 capsule, Rfl: 3    PHYSICAL EXAMINATION:  Physical Exam  Vitals and nursing note reviewed.   Constitutional:       General: He is awake.      Appearance: Normal appearance.   HENT:      Head: Normocephalic.      Right Ear: External ear normal.      Left Ear: External ear normal.      Nose: Nose normal.   Cardiovascular:      Rate and Rhythm: Normal rate.   Pulmonary:      Effort: Pulmonary effort is normal. No respiratory distress.   Abdominal:      Tenderness: There is no abdominal tenderness. There is no right CVA tenderness or left CVA tenderness.   Genitourinary:     Penis: Normal and uncircumcised. No phimosis, paraphimosis or hypospadias.       Testes: Normal.   Musculoskeletal:         General: Normal range of motion.      Cervical back: Normal range of motion.   Skin:     General: Skin is warm and dry.   Neurological:      General: No focal deficit present.      Mental Status: He is alert and oriented to person, place, and time.   Psychiatric:         Mood and Affect: Mood normal.         Behavior: Behavior is cooperative.           LABS:          Lab Results   Component Value Date    PSA 4.4 (H) 09/14/2022    PSA 5.1 (H) 02/01/2021    PSADIAG 4.1 (H) 10/24/2023    PSADIAG 4.1 (H) 04/03/2023    PSADIAG 4.0 09/29/2022       Lab Results   Component Value Date    CREATININE 0.8 10/24/2023    EGFRNORACEVR >60.0 10/24/2023             IMPRESSION:    Encounter Diagnoses   Name Primary?    Erectile dysfunction due to arterial insufficiency Yes    BPH with urinary obstruction          Assessment:       1. Erectile dysfunction due to arterial insufficiency    2. BPH with urinary obstruction        Plan:         I spent 50 minutes with the patient. Over 50% of the visit was  spent in counseling.   We discussed ED and the contributing factors. We reviewed his personal factors that contribute to ED. Patient was educated on ED treatments. We discussed PDE-5 inhibitors, IGNACIO, Muse, and IPP.    He is here today for the Intracorporal injection/ICI education and first injection.  Educational materials were supplied.    ICI is an injectable medication that consists of three different medications to produce an erection. It is known as a Trimix Compound or PEP. The medication is a compound medication and is only mixed up at certain pharmacies known as a compound pharmacy. The medication has to be stored in the refrigerator. Improper storage decreases the effectiveness of the medication.     He was educated that it is an injection. With any injection there is risk for possible pain at the injection site as well as risk for an infection. Clean technique must be followed to help prevent infection. Proper needle disposable is necessary. He voices understanding.    The injection is best given when standing up and the penis is positioned perpendicular to the body. The urethral opening should be facing away from the body. Injection is always given on the lateral or side of the penis. Never give the injection to the top of the penis to avoid possible trauma to arteries and veins. Never give the injection to the bottom of the penis to avoid trauma to the urethra. He should alternate the injection sites to avoid the build up of scar tissue due to multiple injections.    This medication can increase the risk of erections lasting longer than 4 hours. This is known as a priapism and is a medical emergency. This requires immediate medical attention. This is main reason we give the first dose in the office today. We start at low dose and see how well the patients reacts. We can increase the medication if needed depending on the reaction the patient receives today. We discussed the fine line between enough  medication for penetration and too much leading to a priapism. He voices understanding.    Correctly jackson up the initial dose and he injected himself (25 units) in the right lateral aspect of the penis. Within 10 minutes, he achieved a good filling; was pleased.   30 minutes after the injection erection did not get firmer  Detumescence began ~1 hour .     He was instructed to keep the dosage at 30 units. If noticing a decrease in reaction he can increase the dosage by 5 units. He may also refill the Rx.   Refills were sent to [facility].    If have any questions, please give me a call. Educational materials were given to patient and all questions were answered. He voiced understanding and left the office without a priapism. Follow up 3 months.

## 2023-12-01 NOTE — PATIENT INSTRUCTIONS
OSIRIS NGUYEN  [phone]    1. Wipe off top of medication vial with an alcohol prep.   2. With the vial held firmly on a flat surface, insert the syringe into the vial.  3. Now carefully  the vial with the needle in place and turn upside down.  4. You can then push in syringe (like you are giving a shot) to get all the air out of the syringe.  5. You can then pull the plunger of the syringe back down while keeping the needle inserted in the vial to get your desired results.   6. If having difficulty seeing the medication, rapidly pull back the syringe and then you will see the medication fill up.    You may never get all the tiny air bubbles out. It is ok.     He was instructed to keep the dosage at 30 units. If noticing a decrease in reaction he can increase the dosage by 5 units. He may also refill the Rx.

## 2023-12-05 DIAGNOSIS — R73.03 PREDIABETES: Primary | ICD-10-CM

## 2023-12-05 RX ORDER — LANCETS
EACH MISCELLANEOUS
Refills: 0 | OUTPATIENT
Start: 2023-12-05

## 2023-12-05 RX ORDER — INSULIN PUMP SYRINGE, 3 ML
EACH MISCELLANEOUS
Refills: 0 | OUTPATIENT
Start: 2023-12-05

## 2023-12-05 RX ORDER — ISOPROPYL ALCOHOL 70 ML/100ML
SWAB TOPICAL
Refills: 0 | OUTPATIENT
Start: 2023-12-05

## 2023-12-05 NOTE — TELEPHONE ENCOUNTER
No care due was identified.  [facility] Embedded Care Due Messages. Reference number: [id number].   12/05/2023 1:00:46 PM CST

## 2023-12-05 NOTE — TELEPHONE ENCOUNTER
No care due was identified.  [facility] Embedded Care Due Messages. Reference number: [id number].   12/05/2023 10:23:49 AM CST

## 2023-12-05 NOTE — TELEPHONE ENCOUNTER
Refill Routing Note   Medication(s) are not appropriate for processing by [facility] for the following reason(s):        Due for refill >6 months ago    [facility] action(s):  Defer               Appointments  past 12m or future 3m with PCP    Date Provider   Last Visit   10/24/2023 Drew Avendano MD   Next Visit   12/5/2023 Drew Avendano MD   ED visits in past 90 days: 0        Note composed:3:53 PM 12/05/2023

## 2023-12-05 NOTE — TELEPHONE ENCOUNTER
Refill Decision Note   Saba Hansen  is requesting a refill authorization.  Brief Assessment and Rationale for Refill:  Quick Discontinue     Medication Therapy Plan: Pharmacy is requesting new scripts for the following medications without required information, (sig/ frequency/qty/etc)     Medication Reconciliation Completed: No   Comments:     No Care Gaps recommended.     Note composed:4:16 PM 12/05/2023

## 2023-12-06 RX ORDER — ISOPROPYL ALCOHOL 70 ML/100ML
1 SWAB TOPICAL DAILY
Qty: 100 EACH | Refills: 3 | Status: SHIPPED | OUTPATIENT
Start: 2023-12-06

## 2023-12-08 DIAGNOSIS — E78.5 HYPERLIPIDEMIA, UNSPECIFIED HYPERLIPIDEMIA TYPE: ICD-10-CM

## 2023-12-18 ENCOUNTER — PATIENT MESSAGE (OUTPATIENT)
Dept: PRIMARY CARE CLINIC | Facility: CLINIC | Age: 68
End: 2023-12-18
Payer: MEDICARE

## 2023-12-18 RX ORDER — ATORVASTATIN CALCIUM 40 MG/1
40 TABLET, FILM COATED ORAL
Qty: 90 TABLET | Refills: 2 | Status: SHIPPED | OUTPATIENT
Start: 2023-12-18

## 2024-01-22 ENCOUNTER — OFFICE VISIT (OUTPATIENT)
Dept: OPHTHALMOLOGY | Facility: CLINIC | Age: 69
End: 2024-01-22
Payer: MEDICARE

## 2024-01-22 DIAGNOSIS — H04.123 DRY EYE SYNDROME OF BOTH EYES: ICD-10-CM

## 2024-01-22 DIAGNOSIS — H25.13 NUCLEAR SCLEROSIS OF BOTH EYES: Primary | ICD-10-CM

## 2024-01-22 DIAGNOSIS — H52.7 REFRACTIVE ERROR: ICD-10-CM

## 2024-01-22 DIAGNOSIS — H26.9 CORTICAL CATARACT OF BOTH EYES: ICD-10-CM

## 2024-01-22 PROCEDURE — 1160F RVW MEDS BY RX/DR IN RCRD: CPT | Mod: CPTII,S$GLB,, | Performed by: OPHTHALMOLOGY

## 2024-01-22 PROCEDURE — 1126F AMNT PAIN NOTED NONE PRSNT: CPT | Mod: CPTII,S$GLB,, | Performed by: OPHTHALMOLOGY

## 2024-01-22 PROCEDURE — 99999 PR PBB SHADOW E&M-EST. PATIENT-LVL II: CPT | Mod: PBBFAC,,, | Performed by: OPHTHALMOLOGY

## 2024-01-22 PROCEDURE — 1159F MED LIST DOCD IN RCRD: CPT | Mod: CPTII,S$GLB,, | Performed by: OPHTHALMOLOGY

## 2024-01-22 PROCEDURE — 92004 COMPRE OPH EXAM NEW PT 1/>: CPT | Mod: S$GLB,,, | Performed by: OPHTHALMOLOGY

## 2024-01-22 PROCEDURE — 92136 OPHTHALMIC BIOMETRY: CPT | Mod: LT,S$GLB,, | Performed by: OPHTHALMOLOGY

## 2024-01-22 PROCEDURE — 1101F PT FALLS ASSESS-DOCD LE1/YR: CPT | Mod: CPTII,S$GLB,, | Performed by: OPHTHALMOLOGY

## 2024-01-22 PROCEDURE — 3288F FALL RISK ASSESSMENT DOCD: CPT | Mod: CPTII,S$GLB,, | Performed by: OPHTHALMOLOGY

## 2024-01-23 ENCOUNTER — TELEPHONE (OUTPATIENT)
Dept: OPHTHALMOLOGY | Facility: CLINIC | Age: 69
End: 2024-01-23
Payer: MEDICARE

## 2024-01-23 DIAGNOSIS — H25.12 NS (NUCLEAR SCLEROSIS), LEFT: Primary | ICD-10-CM

## 2024-01-23 NOTE — PROGRESS NOTES
Subjective:       Patient ID: Saba Hansen is a 68 y.o. male.    Chief Complaint: Cataract    HPI    68 y.o male is here for Cataract Evaluation. Difficulty seeing at night   especially with bright lights OD>OS. No pain or discomfort, no f/f  No gtts  Last edited by Thony Lord on 1/22/2024  2:25 PM.             Assessment:       1. Nuclear sclerosis of both eyes    2. Cortical cataract of both eyes    3. Dry eye syndrome of both eyes    4. Refractive error        Plan:       Visually significant cataract OU -Pt. Wants Sx.    THOMAS OU-Doing well.  RE      Cataract Surgery Consent: Patient with a visually significant cataract with difficulties of ADLs, reading, driving, night vision, glare (any and all).  Discussed with Patient/Family/Caregiver: options, risks and benefits, expectations of cataract surgery, utilized an eye model with questions and answers to facilitate discussion.  Discussed lens options and patient understands that glasses may be required for optimal vision for distance and/or near vision after cataract surgery.  The Patient/Family/Caregiver  voice good understanding and patient wishes to proceed with surgery.  The patient will likely benefit from surgery and patient signed consent for Left Eye.  Will call Pt to schedule CE OS 1st CNAOTO 20.5,                                            OD 2nd CNAOTO 21.0.  AT's.

## 2024-01-24 DIAGNOSIS — H25.13 NUCLEAR SCLEROTIC CATARACT OF BOTH EYES: Primary | ICD-10-CM

## 2024-01-24 RX ORDER — PREDNISOLONE/MOXIFLOX/BROMF/PF 1 %-0.5 %
1 DROPS OPHTHALMIC (EYE) 3 TIMES DAILY
Qty: 5 ML | Refills: 2 | Status: SHIPPED | OUTPATIENT
Start: 2024-02-03 | End: 2024-03-04

## 2024-01-31 ENCOUNTER — TELEPHONE (OUTPATIENT)
Dept: OPHTHALMOLOGY | Facility: CLINIC | Age: 69
End: 2024-01-31
Payer: MEDICARE

## 2024-01-31 DIAGNOSIS — H25.11 NS (NUCLEAR SCLEROSIS), RIGHT: Primary | ICD-10-CM

## 2024-02-01 ENCOUNTER — TELEPHONE (OUTPATIENT)
Dept: OPHTHALMOLOGY | Facility: CLINIC | Age: 69
End: 2024-02-01
Payer: MEDICARE

## 2024-02-05 NOTE — H&P
[facility]  History & Physical    Subjective:      Chief Complaint/Reason for Admission:     Saba Hansen is a 69 y.o. male.    Past Medical History:   Diagnosis Date    Allergy     Asthma     Male erectile dysfunction, unspecified      Past Surgical History:   Procedure Laterality Date    AORTOPEXY W/ MLB      NASAL SINUS SURGERY      TONSILLECTOMY       Social History     Tobacco Use    Smoking status: Never    Smokeless tobacco: Never   Substance Use Topics    Alcohol use: No     Comment: rarely    Drug use: No       No medications prior to admission.     Review of patient's allergies indicates:   Allergen Reactions    Iodinated contrast media      Hives (skin)^and itchy skin  Hives (skin)^and itchy skin          Review of Systems   Eyes:  Positive for blurred vision.   All other systems reviewed and are negative.      Objective:      Vital Signs (Most Recent)       Vital Signs Range (Last 24H):  BP: ()/()   Arterial Line BP: ()/()     Physical Exam  Constitutional:       Appearance: He is well-developed.   HENT:      Head: Normocephalic.   Eyes:      Conjunctiva/sclera: Conjunctivae normal.      Pupils: Pupils are equal, round, and reactive to light.   Cardiovascular:      Rate and Rhythm: Normal rate.   Pulmonary:      Effort: Pulmonary effort is normal.      Breath sounds: Normal breath sounds.   Abdominal:      General: Bowel sounds are normal.      Palpations: Abdomen is soft.   Musculoskeletal:         General: Normal range of motion.      Cervical back: Normal range of motion and neck supple.   Skin:     General: Skin is warm.   Neurological:      Mental Status: He is alert and oriented to person, place, and time.         Data Review:     ECG:     Assessment:      Cataract OS.    Plan:    CE OS.

## 2024-02-05 NOTE — PRE-PROCEDURE INSTRUCTIONS
Unable to reach pt via phone. The following messages was sent to pt's portal.       Dear Saba     Below you will find basic pre-procedure instructions in preparation for your procedure on 2/6/24 with Dr. Bingham     - Nothing to eat or drink after midnight the night before your procedure until after your procedure, except AM meds with small sips of water.     - HOLD all Diabetic meds AM of surgery  - HOLD all Insulin AM of surgery  - HOLD all Fluid pills AM of surgery  - HOLD all non-insulin shots until after surgery (Ozempic, Mounjaro, Trulicity, Victoza, Byetta, Wegovy and Adlyxin) (up to 7 days prior)  - HOLD all vitamins and herbal meds AM of surgery   - TAKE all B/P meds, EXCEPT those that contain a fluid pill  - USE inhalers as needed and bring AM of surgery  - USE eye drops as directed  -TAKE blood thinner meds AM of surgery unless otherwise instructed     - Shower and wash face with dial soap for 3 mins PM prior and AM of surgery  - No powder, lotions, creams, oils, gels, ointments, makeup,  or jewelry    - Wear comfortable clothing (button up shirt)     (Patient is required to have a responsible ride to transport home, ride may not leave while patient is in surgery)     -- [facility], 2nd floor Surgery Center, located   @ [address] [place] [id number]  2nd Floor Registration        If you have any questions or concerns please feel free to contact your surgeon's office.                 [place], LPN [facility]  Pre-Admit - Anesthesia Dept

## 2024-02-06 ENCOUNTER — ANESTHESIA (OUTPATIENT)
Dept: SURGERY | Facility: HOSPITAL | Age: 69
End: 2024-02-06
Payer: MEDICARE

## 2024-02-06 ENCOUNTER — ANESTHESIA EVENT (OUTPATIENT)
Dept: SURGERY | Facility: HOSPITAL | Age: 69
End: 2024-02-06
Payer: MEDICARE

## 2024-02-06 ENCOUNTER — HOSPITAL ENCOUNTER (OUTPATIENT)
Facility: HOSPITAL | Age: 69
Discharge: HOME | End: 2024-02-06
Attending: OPHTHALMOLOGY | Admitting: OPHTHALMOLOGY
Payer: MEDICARE

## 2024-02-06 VITALS
TEMPERATURE: 99 F | DIASTOLIC BLOOD PRESSURE: 80 MMHG | SYSTOLIC BLOOD PRESSURE: 117 MMHG | HEART RATE: 61 BPM | OXYGEN SATURATION: 98 % | RESPIRATION RATE: 18 BRPM

## 2024-02-06 DIAGNOSIS — H25.12 NUCLEAR SCLEROTIC CATARACT OF LEFT EYE: Primary | ICD-10-CM

## 2024-02-06 PROCEDURE — 37000008 HC ANESTHESIA 1ST 15 MINUTES: Performed by: OPHTHALMOLOGY

## 2024-02-06 PROCEDURE — 94761 N-INVAS EAR/PLS OXIMETRY MLT: CPT

## 2024-02-06 PROCEDURE — 25000003 PHARM REV CODE 250: Performed by: OPHTHALMOLOGY

## 2024-02-06 PROCEDURE — 63600175 PHARM REV CODE 636 W HCPCS: Performed by: NURSE ANESTHETIST, CERTIFIED REGISTERED

## 2024-02-06 PROCEDURE — 36000706: Performed by: OPHTHALMOLOGY

## 2024-02-06 PROCEDURE — 36000707: Performed by: OPHTHALMOLOGY

## 2024-02-06 PROCEDURE — 71000015 HC POSTOP RECOV 1ST HR: Performed by: OPHTHALMOLOGY

## 2024-02-06 PROCEDURE — 99900035 HC TECH TIME PER 15 MIN (STAT)

## 2024-02-06 PROCEDURE — 66984 XCAPSL CTRC RMVL W/O ECP: CPT | Mod: LT,,, | Performed by: OPHTHALMOLOGY

## 2024-02-06 PROCEDURE — 37000009 HC ANESTHESIA EA ADD 15 MINS: Performed by: OPHTHALMOLOGY

## 2024-02-06 PROCEDURE — V2632 POST CHMBR INTRAOCULAR LENS: HCPCS | Performed by: OPHTHALMOLOGY

## 2024-02-06 PROCEDURE — 63600175 PHARM REV CODE 636 W HCPCS: Performed by: OPHTHALMOLOGY

## 2024-02-06 PROCEDURE — 27201423 OPTIME MED/SURG SUP & DEVICES STERILE SUPPLY: Performed by: OPHTHALMOLOGY

## 2024-02-06 PROCEDURE — D9220A PRA ANESTHESIA: Mod: ,,, | Performed by: NURSE ANESTHETIST, CERTIFIED REGISTERED

## 2024-02-06 DEVICE — LENS CLAREON AUTONOME 20.5D: Type: IMPLANTABLE DEVICE | Site: EYE | Status: FUNCTIONAL

## 2024-02-06 RX ORDER — LIDOCAIN/PHENYLEPH/BSS NO.2/PF 1 %-1.5 %
SYRINGE (ML) INTRAOCULAR
Status: DISCONTINUED | OUTPATIENT
Start: 2024-02-06 | End: 2024-02-06 | Stop reason: HOSPADM

## 2024-02-06 RX ORDER — SODIUM CHLORIDE 9 MG/ML
INJECTION, SOLUTION INTRAVENOUS CONTINUOUS
Status: DISCONTINUED | OUTPATIENT
Start: 2024-02-06 | End: 2024-02-06 | Stop reason: HOSPADM

## 2024-02-06 RX ORDER — CYCLOP/TROP/PROPA/PHEN/KET/WAT 1-1-0.1%
1 DROPS (EA) OPHTHALMIC (EYE)
Status: COMPLETED | OUTPATIENT
Start: 2024-02-06 | End: 2024-02-06

## 2024-02-06 RX ORDER — MIDAZOLAM HYDROCHLORIDE 1 MG/ML
INJECTION, SOLUTION INTRAMUSCULAR; INTRAVENOUS
Status: DISCONTINUED | OUTPATIENT
Start: 2024-02-06 | End: 2024-02-06

## 2024-02-06 RX ORDER — ACETAMINOPHEN 325 MG/1
650 TABLET ORAL EVERY 4 HOURS PRN
Status: DISCONTINUED | OUTPATIENT
Start: 2024-02-06 | End: 2024-02-06 | Stop reason: HOSPADM

## 2024-02-06 RX ORDER — LIDOCAINE HYDROCHLORIDE 40 MG/ML
INJECTION, SOLUTION RETROBULBAR
Status: DISCONTINUED | OUTPATIENT
Start: 2024-02-06 | End: 2024-02-06 | Stop reason: HOSPADM

## 2024-02-06 RX ORDER — HYDROCODONE BITARTRATE AND ACETAMINOPHEN 5; 325 MG/1; MG/1
1 TABLET ORAL EVERY 4 HOURS PRN
Status: DISCONTINUED | OUTPATIENT
Start: 2024-02-06 | End: 2024-02-06 | Stop reason: HOSPADM

## 2024-02-06 RX ORDER — PREDNISOLONE ACETATE 10 MG/ML
SUSPENSION/ DROPS OPHTHALMIC
Status: DISCONTINUED | OUTPATIENT
Start: 2024-02-06 | End: 2024-02-06 | Stop reason: HOSPADM

## 2024-02-06 RX ORDER — MOXIFLOXACIN 5 MG/ML
1 SOLUTION/ DROPS OPHTHALMIC
Status: COMPLETED | OUTPATIENT
Start: 2024-02-06 | End: 2024-02-06

## 2024-02-06 RX ORDER — EPINEPHRINE 1 MG/ML
INJECTION, SOLUTION, CONCENTRATE INTRAVENOUS
Status: DISCONTINUED | OUTPATIENT
Start: 2024-02-06 | End: 2024-02-06 | Stop reason: HOSPADM

## 2024-02-06 RX ORDER — PROPARACAINE HYDROCHLORIDE 5 MG/ML
SOLUTION/ DROPS OPHTHALMIC
Status: DISCONTINUED | OUTPATIENT
Start: 2024-02-06 | End: 2024-02-06 | Stop reason: HOSPADM

## 2024-02-06 RX ADMIN — MOXIFLOXACIN OPHTHALMIC 1 DROP: 5 SOLUTION/ DROPS OPHTHALMIC at 10:02

## 2024-02-06 RX ADMIN — Medication 1 DROP: at 10:02

## 2024-02-06 RX ADMIN — MIDAZOLAM 1 MG: 1 INJECTION INTRAMUSCULAR; INTRAVENOUS at 11:02

## 2024-02-06 NOTE — DISCHARGE INSTRUCTIONS
Post Operative Instructions for Cataract Surgery- [place]    STARTING THE SAME DAY OF SURGERY:    Place one (1) drop of Prednisolone-Moxifloxacin-Bromfenac (shake bottle) into the operative eye three (3) times a day.     You will use this drop for four (4) weeks following surgery.      1 DAY POST OPERATIVE APPOINTMENT:    Date: ______________________________/ Time: ___________________________    Location: [facility]- [address], [place] [address]      Please use 1 drop of PREDNISOLONE-MOXIFLOXACIN-BROMFENAC in the operative eye before arriving for your appointment; this will help keep your eye comfortable.       RESTRICTIONS FOR SEVEN (7) DAYS FOLLOWING SURGERY:    DO NOT lift anything over 10 pounds.    DO NOT bend at the waist, only at the knees (keep head in upright position).    DO NOT rub your eye.    DO NOT get any water into the eye.    DO NOT wear any makeup, lotions, or creams on/around the eye.    Wear the protective eye shield you were give after surgery anytime you go to sleep. You may remove the shield while awake.       PLEASE NOTE:    You may take over the counter pain medication such as Tylenol or Ibuprofen as directed, if needed for pain.      *** If you experience severe pain, loss of vision, sudden onset of flashes, and/or floaters, IMMEDIATELY CALL Dr. Bingham's office [phone]; AFTER HOUR [phone]; OR proceed to the EMERGENCY ROOM.

## 2024-02-06 NOTE — PLAN OF CARE
Discharge instructions given to patient and they verbalized understanding of all.  VSS, denies n/v and tolerating PO, rates pain level tolerable. IV removed, and family notified for patient discharge home.

## 2024-02-06 NOTE — BRIEF OP NOTE
[facility]  Brief Operative Note    Surgery Date: 2/6/2024     Surgeon(s) and Role:     * Emeka Bingham MD - Primary    Assisting Surgeon: None    Pre-op Diagnosis:  NS (nuclear sclerosis), left [H25.12]    Post-op Diagnosis:  Post-Op Diagnosis Codes:     * NS (nuclear sclerosis), left [H25.12]    Procedure(s) (LRB):  EXTRACTION, CATARACT, WITH IOL INSERTION (Left)    Anesthesia: Local MAC    Operative Findings:     Estimated Blood Loss: * No values recorded between 2/6/2024 11:51 AM and 2/6/2024 12:15 PM *         Specimens:   Specimen (24h ago, onward)      None              Discharge Note    OUTCOME: Patient tolerated treatment/procedure well without complication and is now ready for discharge.    DISPOSITION: Home or Self Care    FINAL DIAGNOSIS:  Nuclear sclerotic cataract of left eye    FOLLOWUP: In clinic    DISCHARGE INSTRUCTIONS:    Discharge Procedure Orders   Other restrictions (specify):   Order Comments: No heavy lifting or bending for 1 week.

## 2024-02-06 NOTE — OP NOTE
Operative Date:  02/06/2024    Discharge Date:  02/06/2024    Discharge Patient Home    Report Title: Operative Note      SURGEON: Emeka Bingham MD     ASSISTANT:     PREOPERATIVE DIAGNOSIS: Visually significant NSC cataract,  Left Eye.    POSTOPERATIVE DIAGNOSIS: Visually-significant NSC cataract,  Left Eye.    PROCEDURE PERFORMED: Phacoemulsification of the cataract with posterior chamber intraocular lens Left Eye.    ANESTHESIA: Topical with MAC     COMPLICATIONS: None    ESTIMATED BLOOD LOSS: Minimal    INDICATIONS FOR PROCEDURE:   The patient is a pleasant 69 year old gentleman with increasing difficulties with activities of daily living secondary to a dense visually significant cataract in the Left Eye.  Discussions have been carried out with this patient concerning the options to surgery, risks, benefits and expectations.  The patient voiced good understanding and wished to proceed with the above procedure.    PROCEDURE IN DETAIL: The patient was brought to the operating room and received topical anesthetic to the eye and then was prepped and draped in the usual sterile fashion.  Using the Whitehead ring and the guarded rayshawn blade set at 0.37 mm, a partial thickness clear cornea incision was made temporally.  The paracentesis site was made at the six o'clock position.  Omidria was injected into the anterior chamber through the paracentesis.  Viscoat was then injected into the anterior chamber.  The eye was then reentered at the primary surgical site with a 2.4 mm keratome followed by continuous capsulotomy, hydrodissection, hydrodelineation and phacoemulsification of the cataract.  Residual cortical material was removed using automated irrigation-aspiration technique.  Healon was injected into the posterior chamber and a CNAOTO 20.5 diopter lens was placed in the bag without difficulty. Residual viscoelastic was removed using automated irrigation-aspiration technique. The eye was re-pressurized  using BSS solution and both the paracentesis site and the primary surgical site were demonstrated to be watertight at the end of the case with Weck--Elaine manipulation.  One drop of Ofloxacin and one drop of Pred acetate 1% was applied to the Left Eye .The eye was closed, patched and a Farias shield placed.  The patient was taken to the recovery room in good and stable condition.  The patient tolerated the procedure well.  The patient was instructed to refrain from any heavy lifting, bending, stooping or straining activities, discharged home  and to follow-up in the morning for routine postoperative care with Emeka Bingham MD.

## 2024-02-06 NOTE — TRANSFER OF CARE
Anesthesia Transfer of Care Note    Patient: Saba Hansen    Procedure(s) Performed: Procedure(s) (LRB):  EXTRACTION, CATARACT, WITH IOL INSERTION (Left)    Patient location: PACU    Anesthesia Type: MAC    Transport from OR: Transported from OR on room air with adequate spontaneous ventilation    Post pain: adequate analgesia    Post assessment: no apparent anesthetic complications    Post vital signs: stable    Level of consciousness: awake    Nausea/Vomiting: no nausea/vomiting    Complications: none    Transfer of care protocol was followed      Last vitals: Visit Vitals  BP (!) 141/83 (BP Location: Right arm, Patient Position: Sitting)   Pulse 82   Temp 36.6 °C (97.9 °F) (Skin)   Resp 18   SpO2 97%

## 2024-02-06 NOTE — ANESTHESIA PREPROCEDURE EVALUATION
02/06/2024  Saba Hansen is a 69 y.o., male.      Pre-op Assessment    I have reviewed the Patient Summary Reports.     I have reviewed the Nursing Notes. I have reviewed the NPO Status.   I have reviewed the Medications.     Review of Systems  Anesthesia Hx:  No problems with previous Anesthesia             Denies Family Hx of Anesthesia complications.    Denies Personal Hx of Anesthesia complications.                    Hematology/Oncology:  Hematology Normal   Oncology Normal                                   EENT/Dental:  chronic allergic rhinitis           Cardiovascular:                hyperlipidemia                             Pulmonary:    Asthma moderate                   Renal/:    BPH (ED)              Hepatic/GI:  Hepatic/GI Normal                 Musculoskeletal:  Musculoskeletal Normal                Neurological:  Neurology Normal                                      Endocrine:  Endocrine Normal            Dermatological:  Skin Normal    Psych:  Psychiatric Normal                       Anesthesia Plan  Type of Anesthesia, risks & benefits discussed:    Anesthesia Type: Gen Natural Airway  Intra-op Monitoring Plan: Standard ASA Monitors  Post Op Pain Control Plan: multimodal analgesia  Induction:  IV  Informed Consent: Informed consent signed with the Patient and all parties understand the risks and agree with anesthesia plan.  All questions answered. Patient consented to blood products? No  ASA Score: 2  Day of Surgery Review of History & Physical: H&P Update referred to the surgeon/provider.    Ready For Surgery From Anesthesia Perspective.     .

## 2024-02-07 ENCOUNTER — OFFICE VISIT (OUTPATIENT)
Dept: OPHTHALMOLOGY | Facility: CLINIC | Age: 69
End: 2024-02-07
Payer: MEDICARE

## 2024-02-07 ENCOUNTER — ANESTHESIA EVENT (OUTPATIENT)
Dept: SURGERY | Facility: HOSPITAL | Age: 69
End: 2024-02-07
Payer: MEDICARE

## 2024-02-07 DIAGNOSIS — Z98.890 POST-OPERATIVE STATE: Primary | ICD-10-CM

## 2024-02-07 PROCEDURE — 99024 POSTOP FOLLOW-UP VISIT: CPT | Mod: S$GLB,,, | Performed by: OPHTHALMOLOGY

## 2024-02-07 PROCEDURE — 3288F FALL RISK ASSESSMENT DOCD: CPT | Mod: CPTII,S$GLB,, | Performed by: OPHTHALMOLOGY

## 2024-02-07 PROCEDURE — 1126F AMNT PAIN NOTED NONE PRSNT: CPT | Mod: CPTII,S$GLB,, | Performed by: OPHTHALMOLOGY

## 2024-02-07 PROCEDURE — 1101F PT FALLS ASSESS-DOCD LE1/YR: CPT | Mod: CPTII,S$GLB,, | Performed by: OPHTHALMOLOGY

## 2024-02-07 PROCEDURE — 1159F MED LIST DOCD IN RCRD: CPT | Mod: CPTII,S$GLB,, | Performed by: OPHTHALMOLOGY

## 2024-02-07 PROCEDURE — 99999 PR PBB SHADOW E&M-EST. PATIENT-LVL II: CPT | Mod: PBBFAC,,, | Performed by: OPHTHALMOLOGY

## 2024-02-07 NOTE — ANESTHESIA PREPROCEDURE EVALUATION
Pre-op Assessment    I have reviewed the Patient Summary Reports.     I have reviewed the Nursing Notes. I have reviewed the NPO Status.   I have reviewed the Medications.     Review of Systems  Anesthesia Hx:  No problems with previous Anesthesia   History of prior surgery of interest to airway management or planning:          Denies Family Hx of Anesthesia complications.    Denies Personal Hx of Anesthesia complications.                    Hematology/Oncology:  Hematology Normal   Oncology Normal                                   EENT/Dental:  chronic allergic rhinitis           Cardiovascular:  Exercise tolerance: good    Denies Hypertension.           hyperlipidemia                             Pulmonary:    Asthma                    Renal/:    BPH (ED)              Hepatic/GI:  Hepatic/GI Normal                 Musculoskeletal:  Musculoskeletal Normal                Neurological:  Neurology Normal Denies TIA.  Denies CVA.    Denies Seizures.                                Endocrine:  Endocrine Normal            Dermatological:  Skin Normal    Psych:  Psychiatric Normal                    Physical Exam  General: Well nourished, Cooperative, Alert and Oriented    Airway:  Mallampati: III / II  Mouth Opening: Normal  TM Distance: Normal  Tongue: Normal    Dental:  Partial Dentures        Anesthesia Plan  Type of Anesthesia, risks & benefits discussed:    Anesthesia Type: MAC  Intra-op Monitoring Plan: Standard ASA Monitors  Post Op Pain Control Plan: multimodal analgesia  Induction:  IV  Informed Consent: Informed consent signed with the Patient and all parties understand the risks and agree with anesthesia plan.  All questions answered.   ASA Score: 2  Day of Surgery Review of History & Physical: H&P Update referred to the surgeon/provider.    Ready For Surgery From Anesthesia Perspective.     .

## 2024-02-07 NOTE — PROGRESS NOTES
Subjective:       Patient ID: Saba Hansen is a 69 y.o. male.    Chief Complaint: No chief complaint on file.    HPI    Saba Hansen is a 68 y.o. male is here for a 1 day post op OS. No pain   or blurry and mild itching sometimes OS, no f/f  Using Combo gtts tid OS as recommended.       Last edited by Thony Lord on 2/7/2024  9:29 AM.             Assessment:       1. Post-operative state        Plan:       S/p CE OS- Doing well.        CPM OS.  RTC 1 wk.

## 2024-02-12 ENCOUNTER — TELEPHONE (OUTPATIENT)
Dept: OPHTHALMOLOGY | Facility: CLINIC | Age: 69
End: 2024-02-12
Payer: MEDICARE

## 2024-02-14 ENCOUNTER — OFFICE VISIT (OUTPATIENT)
Dept: OPHTHALMOLOGY | Facility: CLINIC | Age: 69
End: 2024-02-14
Payer: MEDICARE

## 2024-02-14 DIAGNOSIS — H25.11 NS (NUCLEAR SCLEROSIS), RIGHT: ICD-10-CM

## 2024-02-14 DIAGNOSIS — Z98.890 POST-OPERATIVE STATE: Primary | ICD-10-CM

## 2024-02-14 PROCEDURE — 1101F PT FALLS ASSESS-DOCD LE1/YR: CPT | Mod: CPTII,S$GLB,, | Performed by: OPHTHALMOLOGY

## 2024-02-14 PROCEDURE — 1159F MED LIST DOCD IN RCRD: CPT | Mod: CPTII,S$GLB,, | Performed by: OPHTHALMOLOGY

## 2024-02-14 PROCEDURE — 1160F RVW MEDS BY RX/DR IN RCRD: CPT | Mod: CPTII,S$GLB,, | Performed by: OPHTHALMOLOGY

## 2024-02-14 PROCEDURE — 99024 POSTOP FOLLOW-UP VISIT: CPT | Mod: S$GLB,,, | Performed by: OPHTHALMOLOGY

## 2024-02-14 PROCEDURE — 1126F AMNT PAIN NOTED NONE PRSNT: CPT | Mod: CPTII,S$GLB,, | Performed by: OPHTHALMOLOGY

## 2024-02-14 PROCEDURE — 92136 OPHTHALMIC BIOMETRY: CPT | Mod: 26,RT,S$GLB, | Performed by: OPHTHALMOLOGY

## 2024-02-14 PROCEDURE — 99999 PR PBB SHADOW E&M-EST. PATIENT-LVL III: CPT | Mod: PBBFAC,,, | Performed by: OPHTHALMOLOGY

## 2024-02-14 PROCEDURE — 3288F FALL RISK ASSESSMENT DOCD: CPT | Mod: CPTII,S$GLB,, | Performed by: OPHTHALMOLOGY

## 2024-02-14 NOTE — PROGRESS NOTES
Subjective:       Patient ID: Saba Hansen is a 69 y.o. male.    Chief Complaint: Post-op Evaluation    HPI    DLS: 2/07/2024    Pt here for 1 week post phaco w/IOL OS- 2/06/2024/Pre-Op phaco w/IOL OD-   Surgery is 2/20/2024  Pt states no eye pain or discomfort. Pt states vision seems to be doing   good in his OS.    Meds;  Combo TID OS    Last edited by Tiffany Ríos on 2/14/2024  8:43 AM.             Assessment:       1. Post-operative state    2. NS (nuclear sclerosis), right        Plan:       S/p CE OS- Doing well.    Visually significant cataract OD -Pt. Wants Sx.        CPM OS.  Cataract Surgery Consent: Patient with a visually significant cataract with difficulties of ADLs, reading, driving, night vision, glare (any and all).  Discussed with Patient/Family/Caregiver: options, risks and benefits, expectations of cataract surgery, utilized an eye model with questions and answers to facilitate discussion.  Discussed lens options and patient understands that glasses may be required for optimal vision for distance and/or near vision after cataract surgery.  The Patient/Family/Caregiver  voice good understanding and patient wishes to proceed with surgery.  The patient will likely benefit from surgery and patient signed consent for Right Eye.  CE OD 2/20/2024.  
Attending Attestation (For Attendings USE Only)...

## 2024-02-18 NOTE — H&P
[facility]  History & Physical    Subjective:      Chief Complaint/Reason for Admission:     Saba Hansen is a 69 y.o. male.    Past Medical History:   Diagnosis Date    Allergy     Asthma     Male erectile dysfunction, unspecified      Past Surgical History:   Procedure Laterality Date    AORTOPEXY W/ MLB      CATARACT EXTRACTION W/  INTRAOCULAR LENS IMPLANT Left 2/6/2024    Procedure: EXTRACTION, CATARACT, WITH IOL INSERTION;  Surgeon: Emeka Bingham MD;  Location: [facility];  Service: Ophthalmology;  Laterality: Left;    NASAL SINUS SURGERY      TONSILLECTOMY       Social History     Tobacco Use    Smoking status: Never    Smokeless tobacco: Never   Substance Use Topics    Alcohol use: No     Comment: rarely    Drug use: No       No medications prior to admission.     Review of patient's allergies indicates:   Allergen Reactions    Iodinated contrast media      Hives (skin)^and itchy skin  Hives (skin)^and itchy skin          Review of Systems   Eyes:  Positive for blurred vision.   All other systems reviewed and are negative.      Objective:      Vital Signs (Most Recent)       Vital Signs Range (Last 24H):  BP: ()/()   Arterial Line BP: ()/()     Physical Exam  Constitutional:       Appearance: He is well-developed.   HENT:      Head: Normocephalic.   Eyes:      Conjunctiva/sclera: Conjunctivae normal.      Pupils: Pupils are equal, round, and reactive to light.   Cardiovascular:      Rate and Rhythm: Normal rate.   Pulmonary:      Effort: Pulmonary effort is normal.      Breath sounds: Normal breath sounds.   Abdominal:      General: Bowel sounds are normal.      Palpations: Abdomen is soft.   Musculoskeletal:         General: Normal range of motion.      Cervical back: Normal range of motion and neck supple.   Skin:     General: Skin is warm.   Neurological:      Mental Status: He is alert and oriented to person, place, and time.         Data Review:     ECG:      Assessment:      Cataract OD.    Plan:    CE OD.

## 2024-02-19 NOTE — PRE-PROCEDURE INSTRUCTIONS
Patient reviewed on 2/19/2024.  Okay to proceed at [facility]. The following pre-procedure instructions and arrival time have been reviewed with patient via phone and sent to patient portal for review.  Patient verbalized an understanding.  Pt to be accompanied by Deny dominguez of procedure as responsible .     Dear Saba     Below you will find basic pre-procedure instructions in preparation for your procedure on 2/20/24 with Dr. Bingham     - Nothing to eat or drink after midnight the night before your procedure until after your procedure, except AM meds with small sips of water.     - HOLD all Diabetic meds AM of surgery  - HOLD all Insulin AM of surgery  - HOLD all Fluid pills AM of surgery  - HOLD all non-insulin shots until after surgery (Ozempic, Mounjaro, Trulicity, Victoza, Byetta, Wegovy and Adlyxin) (up to 7 days prior)  - HOLD all vitamins and herbal meds AM of surgery   - TAKE all B/P meds, EXCEPT those that contain a fluid pill  - USE inhalers as needed and bring AM of surgery  - USE eye drops as directed  -TAKE blood thinner meds AM of surgery unless otherwise instructed     - Shower and wash face with dial soap for 3 mins PM prior and AM of surgery  - No powder, lotions, creams, oils, gels, ointments, makeup,  or jewelry    - Wear comfortable clothing (button up shirt)     (Patient is required to have a responsible ride to transport home, ride may not leave while patient is in surgery)     -- [facility] 2nd [facility], located   @ [address] [place] [address]  2nd Floor Registration        If you have any questions or concerns please feel free to contact your surgeon's office.                 SIENNA Garner  [facility]  Pre-Admit - Anesthesia Dept

## 2024-02-20 ENCOUNTER — HOSPITAL ENCOUNTER (OUTPATIENT)
Facility: HOSPITAL | Age: 69
Discharge: HOME | End: 2024-02-20
Attending: OPHTHALMOLOGY | Admitting: OPHTHALMOLOGY
Payer: MEDICARE

## 2024-02-20 ENCOUNTER — ANESTHESIA (OUTPATIENT)
Dept: SURGERY | Facility: HOSPITAL | Age: 69
End: 2024-02-20
Payer: MEDICARE

## 2024-02-20 VITALS
RESPIRATION RATE: 20 BRPM | TEMPERATURE: 99 F | HEART RATE: 55 BPM | OXYGEN SATURATION: 98 % | HEIGHT: 73 IN | DIASTOLIC BLOOD PRESSURE: 68 MMHG | BODY MASS INDEX: 24.52 KG/M2 | WEIGHT: 185 LBS | SYSTOLIC BLOOD PRESSURE: 129 MMHG

## 2024-02-20 DIAGNOSIS — H25.11 NUCLEAR SCLEROTIC CATARACT OF RIGHT EYE: Primary | ICD-10-CM

## 2024-02-20 PROCEDURE — 36000706: Performed by: OPHTHALMOLOGY

## 2024-02-20 PROCEDURE — 71000015 HC POSTOP RECOV 1ST HR: Performed by: OPHTHALMOLOGY

## 2024-02-20 PROCEDURE — 63600175 PHARM REV CODE 636 W HCPCS: Performed by: OPHTHALMOLOGY

## 2024-02-20 PROCEDURE — 99900035 HC TECH TIME PER 15 MIN (STAT)

## 2024-02-20 PROCEDURE — 94761 N-INVAS EAR/PLS OXIMETRY MLT: CPT

## 2024-02-20 PROCEDURE — D9220A PRA ANESTHESIA: Mod: ,,, | Performed by: NURSE ANESTHETIST, CERTIFIED REGISTERED

## 2024-02-20 PROCEDURE — 27201423 OPTIME MED/SURG SUP & DEVICES STERILE SUPPLY: Performed by: OPHTHALMOLOGY

## 2024-02-20 PROCEDURE — 37000009 HC ANESTHESIA EA ADD 15 MINS: Performed by: OPHTHALMOLOGY

## 2024-02-20 PROCEDURE — 66984 XCAPSL CTRC RMVL W/O ECP: CPT | Mod: 79,RT,, | Performed by: OPHTHALMOLOGY

## 2024-02-20 PROCEDURE — V2632 POST CHMBR INTRAOCULAR LENS: HCPCS | Performed by: OPHTHALMOLOGY

## 2024-02-20 PROCEDURE — 25000003 PHARM REV CODE 250: Performed by: OPHTHALMOLOGY

## 2024-02-20 PROCEDURE — 36000707: Performed by: OPHTHALMOLOGY

## 2024-02-20 PROCEDURE — 37000008 HC ANESTHESIA 1ST 15 MINUTES: Performed by: OPHTHALMOLOGY

## 2024-02-20 PROCEDURE — 63600175 PHARM REV CODE 636 W HCPCS: Performed by: NURSE ANESTHETIST, CERTIFIED REGISTERED

## 2024-02-20 DEVICE — LENS CLAREON AUTONOME 21.0D: Type: IMPLANTABLE DEVICE | Site: EYE | Status: FUNCTIONAL

## 2024-02-20 RX ORDER — PREDNISOLONE ACETATE 10 MG/ML
SUSPENSION/ DROPS OPHTHALMIC
Status: DISCONTINUED | OUTPATIENT
Start: 2024-02-20 | End: 2024-02-20 | Stop reason: HOSPADM

## 2024-02-20 RX ORDER — SODIUM CHLORIDE 9 MG/ML
INJECTION, SOLUTION INTRAVENOUS CONTINUOUS
Status: DISCONTINUED | OUTPATIENT
Start: 2024-02-20 | End: 2024-02-20 | Stop reason: HOSPADM

## 2024-02-20 RX ORDER — EPINEPHRINE 1 MG/ML
INJECTION, SOLUTION, CONCENTRATE INTRAVENOUS
Status: DISCONTINUED | OUTPATIENT
Start: 2024-02-20 | End: 2024-02-20 | Stop reason: HOSPADM

## 2024-02-20 RX ORDER — MIDAZOLAM HYDROCHLORIDE 1 MG/ML
INJECTION INTRAMUSCULAR; INTRAVENOUS
Status: DISCONTINUED | OUTPATIENT
Start: 2024-02-20 | End: 2024-02-20

## 2024-02-20 RX ORDER — MOXIFLOXACIN 5 MG/ML
1 SOLUTION/ DROPS OPHTHALMIC
Status: COMPLETED | OUTPATIENT
Start: 2024-02-20 | End: 2024-02-20

## 2024-02-20 RX ORDER — LIDOCAINE HYDROCHLORIDE 40 MG/ML
INJECTION, SOLUTION RETROBULBAR
Status: DISCONTINUED | OUTPATIENT
Start: 2024-02-20 | End: 2024-02-20 | Stop reason: HOSPADM

## 2024-02-20 RX ORDER — ACETAMINOPHEN 325 MG/1
650 TABLET ORAL EVERY 4 HOURS PRN
Status: DISCONTINUED | OUTPATIENT
Start: 2024-02-20 | End: 2024-02-20 | Stop reason: HOSPADM

## 2024-02-20 RX ORDER — CYCLOP/TROP/PROPA/PHEN/KET/WAT 1-1-0.1%
1 DROPS (EA) OPHTHALMIC (EYE)
Status: COMPLETED | OUTPATIENT
Start: 2024-02-20 | End: 2024-02-20

## 2024-02-20 RX ORDER — MOXIFLOXACIN 5 MG/ML
SOLUTION/ DROPS OPHTHALMIC
Status: DISCONTINUED | OUTPATIENT
Start: 2024-02-20 | End: 2024-02-20 | Stop reason: HOSPADM

## 2024-02-20 RX ORDER — LIDOCAIN/PHENYLEPH/BSS NO.2/PF 1 %-1.5 %
SYRINGE (ML) INTRAOCULAR
Status: DISCONTINUED | OUTPATIENT
Start: 2024-02-20 | End: 2024-02-20 | Stop reason: HOSPADM

## 2024-02-20 RX ORDER — HYDROCODONE BITARTRATE AND ACETAMINOPHEN 5; 325 MG/1; MG/1
1 TABLET ORAL EVERY 4 HOURS PRN
Status: DISCONTINUED | OUTPATIENT
Start: 2024-02-20 | End: 2024-02-20 | Stop reason: HOSPADM

## 2024-02-20 RX ORDER — PROPARACAINE HYDROCHLORIDE 5 MG/ML
SOLUTION/ DROPS OPHTHALMIC
Status: DISCONTINUED | OUTPATIENT
Start: 2024-02-20 | End: 2024-02-20 | Stop reason: HOSPADM

## 2024-02-20 RX ORDER — FENTANYL CITRATE 50 UG/ML
INJECTION, SOLUTION INTRAMUSCULAR; INTRAVENOUS
Status: DISCONTINUED | OUTPATIENT
Start: 2024-02-20 | End: 2024-02-20

## 2024-02-20 RX ADMIN — Medication 1 DROP: at 10:02

## 2024-02-20 RX ADMIN — MIDAZOLAM HYDROCHLORIDE 1 MG: 1 INJECTION INTRAMUSCULAR; INTRAVENOUS at 11:02

## 2024-02-20 RX ADMIN — FENTANYL CITRATE 25 MCG: 50 INJECTION, SOLUTION INTRAMUSCULAR; INTRAVENOUS at 11:02

## 2024-02-20 RX ADMIN — MOXIFLOXACIN OPHTHALMIC 1 DROP: 5 SOLUTION/ DROPS OPHTHALMIC at 10:02

## 2024-02-20 NOTE — TRANSFER OF CARE
"Anesthesia Transfer of Care Note    Patient: Saba Hansen    Procedure(s) Performed: Procedure(s) (LRB):  EXTRACTION, CATARACT, WITH IOL INSERTION (Right)    Patient location: PACU    Anesthesia Type: MAC    Transport from OR: Transported from OR on room air with adequate spontaneous ventilation    Post pain: adequate analgesia    Post assessment: no apparent anesthetic complications    Post vital signs: stable    Level of consciousness: awake, alert and oriented    Nausea/Vomiting: no nausea/vomiting    Complications: none    Transfer of care protocol was followed      Last vitals: Visit Vitals  /67 (BP Location: Left arm, Patient Position: Lying)   Pulse 77   Temp 37 °C (98.6 °F) (Temporal)   Resp 18   Ht 6' 1" (1.854 m)   Wt 83.9 kg (185 lb)   SpO2 98%   BMI 24.41 kg/m²     "

## 2024-02-20 NOTE — DISCHARGE INSTRUCTIONS
Post Operative Instructions for Cataract Surgery- [place]    STARTING THE SAME DAY OF SURGERY:    Place one (1) drop of Prednisolone-Moxifloxacin-Bromfenac (shake bottle) into the operative eye three (3) times a day.     You will use this drop for four (4) weeks following surgery.      1 DAY POST OPERATIVE APPOINTMENT:    Date: ______________________________/ Time: ___________________________    Location: [facility]- [address], [place] [address]      Please use 1 drop of PREDNISOLONE-MOXIFLOXACIN-BROMFENAC in the operative eye before arriving for your appointment; this will help keep your eye comfortable.       RESTRICTIONS FOR SEVEN (7) DAYS FOLLOWING SURGERY:    DO NOT lift anything over 10 pounds.    DO NOT bend at the waist, only at the knees (keep head in upright position).    DO NOT rub your eye.    DO NOT get any water into the eye.    DO NOT wear any makeup, lotions, or creams on/around the eye.    Wear the protective eye shield you were give after surgery anytime you go to sleep. You may remove the shield while awake.       PLEASE NOTE:    You may take over the counter pain medication such as Tylenol or Ibuprofen as directed, if needed for pain.      *** If you experience severe pain, loss of vision, sudden onset of flashes, and/or floaters, IMMEDIATELY CALL Dr. Bingham's office [phone]; AFTER HOUR [phone]; OR proceed to the EMERGENCY ROOM.

## 2024-02-20 NOTE — BRIEF OP NOTE
[facility]  Brief Operative Note    Surgery Date: 2/20/2024     Surgeon(s) and Role:     * Emeka Bingham MD - Primary    Assisting Surgeon: None    Pre-op Diagnosis:  NS (nuclear sclerosis), right [H25.11]    Post-op Diagnosis:  Post-Op Diagnosis Codes:     * NS (nuclear sclerosis), right [H25.11]    Procedure(s) (LRB):  EXTRACTION, CATARACT, WITH IOL INSERTION (Right)    Anesthesia: Local MAC    Operative Findings:     Estimated Blood Loss: * No values recorded between 2/20/2024 11:39 AM and 2/20/2024 12:04 PM *         Specimens:   Specimen (24h ago, onward)      None              Discharge Note    OUTCOME: Patient tolerated treatment/procedure well without complication and is now ready for discharge.    DISPOSITION: Home or Self Care    FINAL DIAGNOSIS:  Nuclear sclerotic cataract of right eye    FOLLOWUP: In clinic    DISCHARGE INSTRUCTIONS:    Discharge Procedure Orders   Other restrictions (specify):   Order Comments: No heavy lifting or bending for 1 week.

## 2024-02-20 NOTE — ANESTHESIA POSTPROCEDURE EVALUATION
Anesthesia Post Evaluation    Patient: Saba Hansen    Procedure(s) Performed: Procedure(s) (LRB):  EXTRACTION, CATARACT, WITH IOL INSERTION (Right)    Final Anesthesia Type: MAC      Patient location during evaluation: PACU  Patient participation: Yes- Able to Participate  Level of consciousness: awake  Post-procedure vital signs: reviewed and stable  Pain management: adequate  Airway patency: patent    PONV status at discharge: No PONV  Anesthetic complications: no      Cardiovascular status: blood pressure returned to baseline  Respiratory status: unassisted  Hydration status: euvolemic  Follow-up not needed.              Vitals Value Taken Time   /68 02/20/24 1218   Temp 37.1 °C (98.8 °F) 02/20/24 1208   Pulse 59 02/20/24 1224   Resp 20 02/20/24 1218   SpO2 97 % 02/20/24 1224   Vitals shown include unvalidated device data.      No case tracking events are documented in the log.      Pain/Gregory Score: Gregory Score: 10 (2/20/2024 12:08 PM)

## 2024-02-20 NOTE — OP NOTE
Operative Date:  02/20/2024    Discharge Date:  02/20/2024    Discharge Patient Home    Report Title: Operative Note      SURGEON: Emeka Bingham MD     ASSISTANT:     PREOPERATIVE DIAGNOSIS: Visually significant NSC cataract,  Right Eye.    POSTOPERATIVE DIAGNOSIS: Visually-significant NSC cataract,  Right Eye.    PROCEDURE PERFORMED: Phacoemulsification of the cataract with posterior chamber intraocular lens Right Eye.    ANESTHESIA: Topical with MAC    COMPLICATIONS: None    ESTIMATED BLOOD LOSS: Minimal    INDICATIONS FOR PROCEDURE:   The patient is a pleasant 69 year old gentleman with increasing difficulties with activities of daily living secondary to a dense visually significant cataract in the Right Eye.  Discussions have been carried out with this patient concerning the options to surgery, risks, benefits and expectations.  The patient voiced good understanding and wished to proceed with the above procedure.    PROCEDURE IN DETAIL: The patient was brought to the operating room and received topical anesthetic to the eye and then was prepped and draped in the usual sterile fashion.  Using the Whitehead ring and the guarded rayshawn blade set at 0.37 mm, a partial thickness clear cornea incision was made temporally.  The paracentesis site was made at the twelve o'clock position.  Omidria was injected into the anterior chamber through the paracentesis.  Viscoat was then injected into the anterior chamber.  The eye was then reentered at the primary surgical site with a 2.4 mm keratome followed by continuous capsulotomy, hydrodissection, hydrodelineation and phacoemulsification of the cataract.  Residual cortical material was removed using automated irrigation-aspiration technique.  Healon was injected into the posterior chamber and a CNAOTO 21.0 diopter lens was placed in the bag without difficulty. Residual viscoelastic was removed using automated irrigation-aspiration technique. The eye was  re-pressurized using BSS solution and both the paracentesis site and the primary surgical site were demonstrated to be watertight at the end of the case with Weck--Elaine manipulation.  One drop of Ofloxacin and one drop of Pred acetate 1% was applied to the Right Eye .The eye was closed, patched and a Farias shield placed.  The patient was taken to the recovery room in good and stable condition.  The patient tolerated the procedure well.  The patient was instructed to refrain from any heavy lifting, bending, stooping or straining activities, discharged home  and to follow-up in the morning for routine postoperative care with Emeka Bingham MD.

## 2024-02-21 ENCOUNTER — OFFICE VISIT (OUTPATIENT)
Dept: OPHTHALMOLOGY | Facility: CLINIC | Age: 69
End: 2024-02-21
Payer: MEDICARE

## 2024-02-21 DIAGNOSIS — Z98.890 POST-OPERATIVE STATE: Primary | ICD-10-CM

## 2024-02-21 PROCEDURE — 1101F PT FALLS ASSESS-DOCD LE1/YR: CPT | Mod: CPTII,S$GLB,, | Performed by: OPHTHALMOLOGY

## 2024-02-21 PROCEDURE — 99024 POSTOP FOLLOW-UP VISIT: CPT | Mod: S$GLB,,, | Performed by: OPHTHALMOLOGY

## 2024-02-21 PROCEDURE — 1159F MED LIST DOCD IN RCRD: CPT | Mod: CPTII,S$GLB,, | Performed by: OPHTHALMOLOGY

## 2024-02-21 PROCEDURE — 99999 PR PBB SHADOW E&M-EST. PATIENT-LVL III: CPT | Mod: PBBFAC,,, | Performed by: OPHTHALMOLOGY

## 2024-02-21 PROCEDURE — 3288F FALL RISK ASSESSMENT DOCD: CPT | Mod: CPTII,S$GLB,, | Performed by: OPHTHALMOLOGY

## 2024-02-21 PROCEDURE — 1126F AMNT PAIN NOTED NONE PRSNT: CPT | Mod: CPTII,S$GLB,, | Performed by: OPHTHALMOLOGY

## 2024-02-21 PROCEDURE — 1160F RVW MEDS BY RX/DR IN RCRD: CPT | Mod: CPTII,S$GLB,, | Performed by: OPHTHALMOLOGY

## 2024-02-21 NOTE — PROGRESS NOTES
Subjective:       Patient ID: Saba Hansen is a 69 y.o. male.    Chief Complaint: Post-op Evaluation (1 day post op phaco OD)    HPI     Post-op Evaluation     Additional comments: 1 day post op phaco OD           Comments    C/o itching, no pain, no floaters or flashes ou.    Combo tid OU          Last edited by Emeka Bingham MD on 2/21/2024 11:53 AM.             Assessment:       1. Post-operative state        Plan:       S/p CE OU- Doing well.      CPM OU.  RTC 1 wk.

## 2024-02-28 ENCOUNTER — OFFICE VISIT (OUTPATIENT)
Dept: OPHTHALMOLOGY | Facility: CLINIC | Age: 69
End: 2024-02-28
Payer: MEDICARE

## 2024-02-28 DIAGNOSIS — Z98.890 POST-OPERATIVE STATE: Primary | ICD-10-CM

## 2024-02-28 PROCEDURE — 1126F AMNT PAIN NOTED NONE PRSNT: CPT | Mod: CPTII,S$GLB,, | Performed by: OPHTHALMOLOGY

## 2024-02-28 PROCEDURE — 1160F RVW MEDS BY RX/DR IN RCRD: CPT | Mod: CPTII,S$GLB,, | Performed by: OPHTHALMOLOGY

## 2024-02-28 PROCEDURE — 99999 PR PBB SHADOW E&M-EST. PATIENT-LVL II: CPT | Mod: PBBFAC,,, | Performed by: OPHTHALMOLOGY

## 2024-02-28 PROCEDURE — 1159F MED LIST DOCD IN RCRD: CPT | Mod: CPTII,S$GLB,, | Performed by: OPHTHALMOLOGY

## 2024-02-28 PROCEDURE — 99024 POSTOP FOLLOW-UP VISIT: CPT | Mod: S$GLB,,, | Performed by: OPHTHALMOLOGY

## 2024-02-28 NOTE — PROGRESS NOTES
Subjective:       Patient ID: Saba Hansen is a 69 y.o. male.    Chief Complaint: Post-op Evaluation    HPI    Here one week S/P Phaco /IOL OD  [facility] OD good.  Eye meds: PRED/MOXI/BROM TID OD  Last edited by Anayeli Barrios on 2/28/2024  8:57 AM.             Assessment:       1. Post-operative state        Plan:       S/p CE OU- Doing well.      CPM OU.  RTC 3 wks.

## 2024-03-01 ENCOUNTER — LAB VISIT (OUTPATIENT)
Dept: LAB | Facility: HOSPITAL | Age: 69
End: 2024-03-01
Payer: MEDICARE

## 2024-03-01 ENCOUNTER — OFFICE VISIT (OUTPATIENT)
Dept: UROLOGY | Facility: CLINIC | Age: 69
End: 2024-03-01
Payer: MEDICARE

## 2024-03-01 VITALS
DIASTOLIC BLOOD PRESSURE: 78 MMHG | WEIGHT: 185 LBS | SYSTOLIC BLOOD PRESSURE: 133 MMHG | HEART RATE: 70 BPM | HEIGHT: 73 IN | BODY MASS INDEX: 24.52 KG/M2

## 2024-03-01 DIAGNOSIS — N52.01 ERECTILE DYSFUNCTION DUE TO ARTERIAL INSUFFICIENCY: ICD-10-CM

## 2024-03-01 DIAGNOSIS — R97.20 ELEVATED PROSTATE SPECIFIC ANTIGEN (PSA): ICD-10-CM

## 2024-03-01 DIAGNOSIS — N13.8 BPH WITH URINARY OBSTRUCTION: ICD-10-CM

## 2024-03-01 DIAGNOSIS — N13.8 BPH WITH URINARY OBSTRUCTION: Primary | ICD-10-CM

## 2024-03-01 DIAGNOSIS — N40.1 BPH WITH URINARY OBSTRUCTION: ICD-10-CM

## 2024-03-01 DIAGNOSIS — N40.1 BPH WITH URINARY OBSTRUCTION: Primary | ICD-10-CM

## 2024-03-01 DIAGNOSIS — R39.9 LOWER URINARY TRACT SYMPTOMS (LUTS): ICD-10-CM

## 2024-03-01 LAB — COMPLEXED PSA SERPL-MCNC: 2.7 NG/ML (ref 0–4)

## 2024-03-01 PROCEDURE — 84153 ASSAY OF PSA TOTAL: CPT | Performed by: NURSE PRACTITIONER

## 2024-03-01 PROCEDURE — 99999 PR PBB SHADOW E&M-EST. PATIENT-LVL III: CPT | Mod: PBBFAC,,, | Performed by: NURSE PRACTITIONER

## 2024-03-01 PROCEDURE — 3075F SYST BP GE 130 - 139MM HG: CPT | Mod: CPTII,S$GLB,, | Performed by: NURSE PRACTITIONER

## 2024-03-01 PROCEDURE — 1160F RVW MEDS BY RX/DR IN RCRD: CPT | Mod: CPTII,S$GLB,, | Performed by: NURSE PRACTITIONER

## 2024-03-01 PROCEDURE — 1159F MED LIST DOCD IN RCRD: CPT | Mod: CPTII,S$GLB,, | Performed by: NURSE PRACTITIONER

## 2024-03-01 PROCEDURE — 3288F FALL RISK ASSESSMENT DOCD: CPT | Mod: CPTII,S$GLB,, | Performed by: NURSE PRACTITIONER

## 2024-03-01 PROCEDURE — 1101F PT FALLS ASSESS-DOCD LE1/YR: CPT | Mod: CPTII,S$GLB,, | Performed by: NURSE PRACTITIONER

## 2024-03-01 PROCEDURE — 3008F BODY MASS INDEX DOCD: CPT | Mod: CPTII,S$GLB,, | Performed by: NURSE PRACTITIONER

## 2024-03-01 PROCEDURE — 99214 OFFICE O/P EST MOD 30 MIN: CPT | Mod: S$GLB,,, | Performed by: NURSE PRACTITIONER

## 2024-03-01 PROCEDURE — 3078F DIAST BP <80 MM HG: CPT | Mod: CPTII,S$GLB,, | Performed by: NURSE PRACTITIONER

## 2024-03-01 PROCEDURE — 36415 COLL VENOUS BLD VENIPUNCTURE: CPT | Performed by: NURSE PRACTITIONER

## 2024-03-01 NOTE — PROGRESS NOTES
CHIEF COMPLAINT:    Saba Hansen is a 69 y.o. male presents today for Follow Up Visit    HISTORY OF PRESENTING ILLINESS:    Saba Hansen is a 69 y.o. male who is an established patient in our clinic . He has a history of BPH with LUTS. He has an elevated PSA.   Is s/p a TRUS bx. 4/14/21 when PSA 9.1.  This was prior to proscar  Is now on flomax and proscar.  Is pleased with how he voids.  He has ED.  He's tried and failed Cialis.  His T is normal at 527.   Seen 10/26/2023 with Dr. Samano     Last clinic visit was 12/01/2023 for ICI education and 1st injection.     Here today for f/u visit.   Reports great results with Trimix. No issues with using 25 units. Never increased.   Reports that he has a slight curvature to the left when penis is erect. This was present years before. Started after an injury during intercourse. There is no pain or issues with sex. Just wanted to let me know.         10/24/2023 Hgb A1c was 5.7        REVIEW OF SYSTEMS:  Review of Systems   Constitutional: Negative.  Negative for chills and fever.   Eyes:  Negative for double vision.   Respiratory:  Negative for cough and shortness of breath.    Cardiovascular:  Negative for chest pain and palpitations.   Gastrointestinal:  Negative for abdominal pain, constipation, diarrhea, nausea and vomiting.   Genitourinary:  Positive for frequency.        Ok with urination     Musculoskeletal:  Negative for falls.   Neurological:  Negative for dizziness and seizures.   Endo/Heme/Allergies:  Negative for polydipsia.         PATIENT HISTORY:    Past Medical History:   Diagnosis Date    Allergy     Asthma     Male erectile dysfunction, unspecified        Past Surgical History:   Procedure Laterality Date    AORTOPEXY W/ MLB      CATARACT EXTRACTION W/  INTRAOCULAR LENS IMPLANT Left 2/6/2024    Procedure: EXTRACTION, CATARACT, WITH IOL INSERTION;  Surgeon: Emeka Bingham MD;  Location: [facility];  Service: Ophthalmology;  Laterality: Left;     CATARACT EXTRACTION W/  INTRAOCULAR LENS IMPLANT Right 2/20/2024    Procedure: EXTRACTION, CATARACT, WITH IOL INSERTION;  Surgeon: Emeka Bingham MD;  Location: [facility];  Service: Ophthalmology;  Laterality: Right;    NASAL SINUS SURGERY      TONSILLECTOMY         Family History   Problem Relation Age of Onset    Hypertension Mother     No Known Problems Father     Allergic rhinitis Neg Hx     Allergies Neg Hx     Angioedema Neg Hx     Asthma Neg Hx     Atopy Neg Hx     Rhinitis Neg Hx     Urticaria Neg Hx     Immunodeficiency Neg Hx     Eczema Neg Hx        Social History     Socioeconomic History    Marital status: Single   Tobacco Use    Smoking status: Never    Smokeless tobacco: Never   Substance and Sexual Activity    Alcohol use: No     Comment: rarely    Drug use: No    Sexual activity: Yes       Allergies:  Iodinated contrast media    Medications:    Current Outpatient Medications:     ADVAIR -21 mcg/actuation HFAA inhaler, INHALE 2 PUFFS BY MOUTH TWICE DAILY, Disp: 36 g, Rfl: 3    albuterol (PROAIR HFA) 90 mcg/actuation inhaler, Inhale 2 puffs into the lungs every 6 (six) hours as needed for Wheezing. Rescue, Disp: 18 g, Rfl: 2    alcohol swabs (DROPSAFE ALCOHOL PREP PADS) PadM, Apply 1 each topically once daily. As directed, Disp: 100 each, Rfl: 3    atorvastatin (LIPITOR) 40 MG tablet, TAKE 1 TABLET(40 MG) BY MOUTH EVERY DAY, Disp: 90 tablet, Rfl: 2    azelastine (ASTELIN) 137 mcg (0.1 %) nasal spray, 2 sprays (274 mcg total) by Nasal route 2 (two) times daily., Disp: 30 mL, Rfl: 3    cetirizine (ZYRTEC) 10 MG tablet, Take 1 tablet (10 mg total) by mouth once daily., Disp: 90 tablet, Rfl: 3    finasteride (PROSCAR) 5 mg tablet, Take 1 tablet (5 mg total) by mouth once daily., Disp: 90 tablet, Rfl: 3    fluticasone propionate (FLONASE) 50 mcg/actuation nasal spray, 2 sprays (100 mcg total) by Each Nostril route 2 (two) times a day., Disp: 16 g, Rfl: 3    ibuprofen (ADVIL,MOTRIN) 800 MG  tablet, Take 1 tablet (800 mg total) by mouth every 6 (six) hours as needed for Pain (L shoulder pain)., Disp: 12 tablet, Rfl: 0    lancets (TRUEPLUS LANCETS) 33 gauge Misc, 1 lancet by Misc.(Non-Drug; Combo Route) route once daily., Disp: 100 each, Rfl: 0    montelukast (SINGULAIR) 10 mg tablet, Take 1 tablet (10 mg total) by mouth every evening., Disp: 90 tablet, Rfl: 0    papaverine 30 mg/mL injection, Add: Phentolamine 1 mg Add: PGE1 10 mcg  Sig:  Inject 30 units as directed; titrate up by 5 units until desired results, Disp: 10 mL, Rfl: 12    prednisolone/moxiflox/bromf/PF (TFXLSUVHUSO-VZRLQBHG-NVGSS,PF,) 1-0.5-0.09 % Drop, Place 1 drop into the left eye 3 (three) times daily., Disp: 5 mL, Rfl: 2    tamsulosin (FLOMAX) 0.4 mg Cap, Take 1 capsule (0.4 mg total) by mouth once daily. prostate, Disp: 90 capsule, Rfl: 3    PHYSICAL EXAMINATION:  Physical Exam  Vitals and nursing note reviewed.   Constitutional:       General: He is awake.      Appearance: Normal appearance.   HENT:      Head: Normocephalic.      Right Ear: External ear normal.      Left Ear: External ear normal.      Nose: Nose normal.   Cardiovascular:      Rate and Rhythm: Normal rate.   Pulmonary:      Effort: Pulmonary effort is normal. No respiratory distress.   Abdominal:      Tenderness: There is no abdominal tenderness. There is no right CVA tenderness or left CVA tenderness.   Genitourinary:     Penis: Normal and uncircumcised. No phimosis, paraphimosis or hypospadias.       Testes: Normal.      Prostate: Enlarged (~40gms; smooth). Not tender and no nodules present.      Rectum: Normal.   Musculoskeletal:         General: Normal range of motion.      Cervical back: Normal range of motion.   Skin:     General: Skin is warm and dry.   Neurological:      General: No focal deficit present.      Mental Status: He is alert and oriented to person, place, and time.   Psychiatric:         Mood and Affect: Mood normal.         Behavior: Behavior is  cooperative.           LABS:          Lab Results   Component Value Date    PSA 4.4 (H) 09/14/2022    PSA 5.1 (H) 02/01/2021    PSADIAG 4.1 (H) 10/24/2023    PSADIAG 4.1 (H) 04/03/2023    PSADIAG 4.0 09/29/2022       Lab Results   Component Value Date    CREATININE 0.8 10/24/2023    EGFRNORACEVR >60.0 10/24/2023             IMPRESSION:    Encounter Diagnoses   Name Primary?    BPH with urinary obstruction Yes    Elevated prostate specific antigen (PSA)     Erectile dysfunction due to arterial insufficiency     Lower urinary tract symptoms (LUTS)          Assessment:       1. BPH with urinary obstruction    2. Elevated prostate specific antigen (PSA)    3. Erectile dysfunction due to arterial insufficiency    4. Lower urinary tract symptoms (LUTS)        Plan:         I spent 30 minutes with the patient of which more than half was spent in direct consultation with the patient in regards to our treatment and plan.  We addressed the office findings and recent labs.   Education and recommendations of today's plan of care including home remedies and needed follow up with PCP.   We discussed the chief complaint; reviewed the LUTS and the possible contributory factors.   Reviewed management  Recommended lifestyle modifications with a proper, healthy diet, good hydration but during the day. Reducing bladder irritants.   Benefits of regular exercise.  Continue Trimix  Continue Flomax and Finasteride   PSA today  He can follow up with me for ED and Elevated PSA in 6 months

## 2024-03-05 ENCOUNTER — TELEPHONE (OUTPATIENT)
Dept: UROLOGY | Facility: CLINIC | Age: 69
End: 2024-03-05
Payer: MEDICARE

## 2024-03-18 ENCOUNTER — TELEPHONE (OUTPATIENT)
Dept: PRIMARY CARE CLINIC | Facility: CLINIC | Age: 69
End: 2024-03-18
Payer: MEDICARE

## 2024-03-18 ENCOUNTER — TELEPHONE (OUTPATIENT)
Dept: OTOLARYNGOLOGY | Facility: CLINIC | Age: 69
End: 2024-03-18
Payer: MEDICARE

## 2024-03-18 NOTE — TELEPHONE ENCOUNTER
----- Message from Rosy Campbell sent at 3/18/2024 11:12 AM CDT -----  Contact: [phone]  Pt states he had sinus sx about 6 years and it never healed him completely. He says he can only smell about 2 weeks out the year and it has gotten back to the point where he is sleeping with his mouth completely open and he is completley stopped up again . He states he needs to know what to do from this point b/c it is getting to where he is at his limit and would like to see about have this fixed. Can you please call him to discuss if anything can be done further re this? Thanks

## 2024-03-18 NOTE — TELEPHONE ENCOUNTER
----- Message from Rosy Campbell sent at 3/18/2024 11:02 AM CDT -----  Contact: [phone]  Pt had a nasal procedure with Dr Giles about two years ago and he states he never got completely better. His nose is now completely stopped up again and he has to sleep with his mouth completely open at nigh. Pt wants to speak with Dr Avendano re this.

## 2024-03-19 ENCOUNTER — HOSPITAL ENCOUNTER (OUTPATIENT)
Dept: RADIOLOGY | Facility: HOSPITAL | Age: 69
Discharge: HOME | End: 2024-03-19
Attending: OTOLARYNGOLOGY
Payer: MEDICARE

## 2024-03-19 ENCOUNTER — OFFICE VISIT (OUTPATIENT)
Dept: OTOLARYNGOLOGY | Facility: CLINIC | Age: 69
End: 2024-03-19
Payer: MEDICARE

## 2024-03-19 VITALS
HEART RATE: 85 BPM | WEIGHT: 189.63 LBS | DIASTOLIC BLOOD PRESSURE: 74 MMHG | SYSTOLIC BLOOD PRESSURE: 115 MMHG | BODY MASS INDEX: 25.01 KG/M2

## 2024-03-19 DIAGNOSIS — J34.3 NASAL TURBINATE HYPERTROPHY: ICD-10-CM

## 2024-03-19 DIAGNOSIS — J32.8 OTHER CHRONIC SINUSITIS: ICD-10-CM

## 2024-03-19 DIAGNOSIS — J45.40 MODERATE PERSISTENT ASTHMA WITHOUT COMPLICATION: ICD-10-CM

## 2024-03-19 DIAGNOSIS — J33.9 NASAL POLYPOSIS: ICD-10-CM

## 2024-03-19 DIAGNOSIS — J32.8 OTHER CHRONIC SINUSITIS: Primary | ICD-10-CM

## 2024-03-19 DIAGNOSIS — J31.0 CHRONIC RHINITIS: Primary | ICD-10-CM

## 2024-03-19 PROCEDURE — 1126F AMNT PAIN NOTED NONE PRSNT: CPT | Mod: CPTII,S$GLB,, | Performed by: OTOLARYNGOLOGY

## 2024-03-19 PROCEDURE — 1159F MED LIST DOCD IN RCRD: CPT | Mod: CPTII,S$GLB,, | Performed by: OTOLARYNGOLOGY

## 2024-03-19 PROCEDURE — 99999 PR PBB SHADOW E&M-EST. PATIENT-LVL IV: CPT | Mod: PBBFAC,,, | Performed by: OTOLARYNGOLOGY

## 2024-03-19 PROCEDURE — 31231 NASAL ENDOSCOPY DX: CPT | Mod: S$GLB,,, | Performed by: OTOLARYNGOLOGY

## 2024-03-19 PROCEDURE — 1101F PT FALLS ASSESS-DOCD LE1/YR: CPT | Mod: CPTII,S$GLB,, | Performed by: OTOLARYNGOLOGY

## 2024-03-19 PROCEDURE — 3074F SYST BP LT 130 MM HG: CPT | Mod: CPTII,S$GLB,, | Performed by: OTOLARYNGOLOGY

## 2024-03-19 PROCEDURE — 3288F FALL RISK ASSESSMENT DOCD: CPT | Mod: CPTII,S$GLB,, | Performed by: OTOLARYNGOLOGY

## 2024-03-19 PROCEDURE — 3078F DIAST BP <80 MM HG: CPT | Mod: CPTII,S$GLB,, | Performed by: OTOLARYNGOLOGY

## 2024-03-19 PROCEDURE — 70486 CT MAXILLOFACIAL W/O DYE: CPT | Mod: TC

## 2024-03-19 PROCEDURE — 3008F BODY MASS INDEX DOCD: CPT | Mod: CPTII,S$GLB,, | Performed by: OTOLARYNGOLOGY

## 2024-03-19 PROCEDURE — 99214 OFFICE O/P EST MOD 30 MIN: CPT | Mod: 25,S$GLB,, | Performed by: OTOLARYNGOLOGY

## 2024-03-19 PROCEDURE — 1160F RVW MEDS BY RX/DR IN RCRD: CPT | Mod: CPTII,S$GLB,, | Performed by: OTOLARYNGOLOGY

## 2024-03-19 PROCEDURE — 70486 CT MAXILLOFACIAL W/O DYE: CPT | Mod: 26,,, | Performed by: RADIOLOGY

## 2024-03-19 NOTE — PROCEDURES
Nasal/sinus endoscopy    Date/Time: 3/19/2024 10:45 AM    Performed by: Rm Botello MD  Authorized by: Rm Botello MD    Anesthesia:     Local anesthetic:  Lidocaine 4% and Matthias-Synephrine 1/2%    Patient tolerance:  Patient tolerated the procedure well with no immediate complications  Nose:     Procedure Performed:  Nasal Endoscopy  External:      No external nasal deformity  Intranasal:      Mucosa polyps     Mucosa ulcers not present     No mucosa lesions present     Turbinates not enlarged     No septum gross deformity  Nasopharynx:      No mucosa lesions     Adenoids not present     Posterior choanae patent     Eustachian tube not patent     Grade 4 polyps bilaterally  Diffuse copious mucus  Purulent PND bilaterally

## 2024-03-19 NOTE — H&P (VIEW-ONLY)
Subjective:      Saba is a 69 y.o. male who comes for follow-up of sinusitis. His last visit with me was on 4/20/2023. Now over 6 years status-post endoscopic sinus surgery.  Worsening congestion bilaterally, greater on left, not improved with fluticasone spray or saline rinse over 8 weeks.  Antibiotic courses last year didn't help, now worsened since then.  Marked hyposmia and rhinorrhea.        %       The patient's medications, allergies, past medical, surgical, social and family histories were reviewed and updated as appropriate.    A detailed review of systems was obtained with pertinent positives as per the above HPI, and otherwise negative.        Objective:     /74 (BP Location: Right arm, Patient Position: Sitting, BP Method: Large (Automatic))   Pulse 85   Wt 86 kg (189 lb 9.5 oz)   BMI 25.01 kg/m²        Constitutional:   He appears well-developed. He is cooperative. Normal speech.  No hoarse voice.      Head:  Normocephalic. Salivary glands normal.  Facial strength is normal.      Ears:    Right Ear: No drainage or tenderness. Tympanic membrane is not perforated. Tympanic membrane mobility is normal. No middle ear effusion. No decreased hearing is noted.   Left Ear: No drainage or tenderness. Tympanic membrane is not perforated. Tympanic membrane mobility is normal.  No middle ear effusion. No decreased hearing is noted.     Nose:  Mucosal edema, rhinorrhea and polyps present. No septal deviation. No epistaxis. Turbinate hypertrophy.  Turbinates normal and no turbinate masses.  Right sinus exhibits no maxillary sinus tenderness and no frontal sinus tenderness. Left sinus exhibits no maxillary sinus tenderness and no frontal sinus tenderness.     Mouth/Throat  Oropharynx clear and moist without lesions or asymmetry and normal uvula midline. He does not have dentures. Normal dentition. No oral lesions or mucous membrane lesions. No oropharyngeal exudate or posterior oropharyngeal erythema.  Mirror exam not performed due to patient tolerance.  Mirror exam not performed due to patient tolerance.      Neck:  Neck normal without thyromegaly masses, asymmetry, normal tracheal structure, crepitus, and tenderness, thyroid normal, trachea normal and no adenopathy. Normal range of motion present.     He has no cervical adenopathy.     Cardiovascular:    Regular rhythm.              Pulmonary/Chest:   Effort normal.     Psychiatric:   He has a normal mood and affect. His speech is normal and behavior is normal.     Neurological:   No cranial nerve deficit.     Skin:   No rash noted.       Procedure    Nasal endoscopy performed.  See procedure note.        Data Reviewed    WBC (K/uL)   Date Value   10/24/2023 7.07     Eosinophil % (%)   Date Value   10/24/2023 15.1 (H)     Eos # (K/uL)   Date Value   10/24/2023 1.1 (H)     Platelets (K/uL)   Date Value   10/24/2023 193     Glucose (mg/dL)   Date Value   10/24/2023 81     Total IgE (IU/mL)   Date Value   04/27/2018 102 (H)       Pathology report indicated chronic inflammation with eosinophilia.  Cultures showed P acnes.    I independently reviewed the images of the CT sinuses dated today in clinic. Pertinent findings include complete opacification of all sinuses bilaterally.      Assessment:     1. Chronic rhinitis    2. Other chronic sinusitis    3. Nasal polyposis    4. Nasal turbinate hypertrophy    5. Moderate persistent asthma without complication         Plan:     Rx amoxicillin 10 day course.  Rx prednisone low dose with taper.  He would benefit from revision endoscopic sinus surgery for the treatment of his condition.  This would include all sinuses and would be bilateral.  Inferior turbinate reduction would be included.  I discussed the risks, benefits and alternatives to surgery with the patient, as well as the expected postoperative course.  I gave him the opportunity to ask questions and I answered all of them.  I provided relevant printed information  on his condition for him to review at home.  Same-day discharge is anticipated.  He may have anesthesia triage by telephone.   The surgery will be scheduled in the near future.  Follow up for surgery.

## 2024-03-20 DIAGNOSIS — J32.8 OTHER CHRONIC SINUSITIS: ICD-10-CM

## 2024-03-20 DIAGNOSIS — J33.9 NASAL POLYPOSIS: ICD-10-CM

## 2024-03-20 RX ORDER — PREDNISONE 10 MG/1
TABLET ORAL
Qty: 20 TABLET | Refills: 0 | Status: SHIPPED | OUTPATIENT
Start: 2024-03-20

## 2024-03-20 RX ORDER — AMOXICILLIN 500 MG/1
TABLET, FILM COATED ORAL
Qty: 20 TABLET | Refills: 0 | Status: SHIPPED | OUTPATIENT
Start: 2024-03-20

## 2024-03-25 ENCOUNTER — OFFICE VISIT (OUTPATIENT)
Dept: OPHTHALMOLOGY | Facility: CLINIC | Age: 69
End: 2024-03-25
Payer: MEDICARE

## 2024-03-25 DIAGNOSIS — H04.123 DRY EYE SYNDROME OF BOTH EYES: ICD-10-CM

## 2024-03-25 DIAGNOSIS — Z98.890 POST-OPERATIVE STATE: Primary | ICD-10-CM

## 2024-03-25 DIAGNOSIS — H52.7 REFRACTIVE ERROR: ICD-10-CM

## 2024-03-25 PROCEDURE — 1159F MED LIST DOCD IN RCRD: CPT | Mod: CPTII,S$GLB,, | Performed by: OPHTHALMOLOGY

## 2024-03-25 PROCEDURE — 99999 PR PBB SHADOW E&M-EST. PATIENT-LVL III: CPT | Mod: PBBFAC,,, | Performed by: OPHTHALMOLOGY

## 2024-03-25 PROCEDURE — 1160F RVW MEDS BY RX/DR IN RCRD: CPT | Mod: CPTII,S$GLB,, | Performed by: OPHTHALMOLOGY

## 2024-03-25 PROCEDURE — 3288F FALL RISK ASSESSMENT DOCD: CPT | Mod: CPTII,S$GLB,, | Performed by: OPHTHALMOLOGY

## 2024-03-25 PROCEDURE — 99024 POSTOP FOLLOW-UP VISIT: CPT | Mod: S$GLB,,, | Performed by: OPHTHALMOLOGY

## 2024-03-25 PROCEDURE — 1101F PT FALLS ASSESS-DOCD LE1/YR: CPT | Mod: CPTII,S$GLB,, | Performed by: OPHTHALMOLOGY

## 2024-03-25 PROCEDURE — 1126F AMNT PAIN NOTED NONE PRSNT: CPT | Mod: CPTII,S$GLB,, | Performed by: OPHTHALMOLOGY

## 2024-03-25 NOTE — PROGRESS NOTES
Subjective:       Patient ID: Saba Hansen is a 69 y.o. male.    Chief Complaint: Post-op Evaluation    HPI    S/p phaco w/IOL OS- 2/06/2024  S/p phaco w/IOL OD- 2/20/2024    Pt here for 3 week post op OU.  Last edited by Tiffany Law MA on 3/25/2024  9:27 AM.             Assessment:       1. Post-operative state    2. Dry eye syndrome of both eyes    3. Refractive error        Plan:       S/p CE OU- Doing well.  THOMAS OU-Stable.  RE-Pt wants MRx.      AT's.  Give MRx.  RTC Dr Adamson in 6 mos.

## 2024-04-12 ENCOUNTER — ANESTHESIA EVENT (OUTPATIENT)
Dept: SURGERY | Facility: HOSPITAL | Age: 69
End: 2024-04-12
Payer: MEDICARE

## 2024-04-12 NOTE — ANESTHESIA PREPROCEDURE EVALUATION
04/12/2024  Saba Hansen is a 69 y.o., male for FESS, USING COMPUTER-ASSISTED NAVIGATION, WITH NASAL TURBINATE REDUCTION (Bilateral)  Patient Active Problem List   Diagnosis    Tonsillar mass    Chronic sinusitis    Chronic rhinitis    Moderate persistent asthma without complication    Nasal polyps    Allergic rhinitis    BPH with urinary obstruction    Erectile dysfunction due to arterial insufficiency    Other hyperlipidemia    Elevated prostate specific antigen (PSA)    Nuclear sclerotic cataract of right eye     Past Surgical History:   Procedure Laterality Date    AORTOPEXY W/ MLB      CATARACT EXTRACTION W/  INTRAOCULAR LENS IMPLANT Left 2/6/2024    Procedure: EXTRACTION, CATARACT, WITH IOL INSERTION;  Surgeon: Emeka Bingham MD;  Location: [facility];  Service: Ophthalmology;  Laterality: Left;    CATARACT EXTRACTION W/  INTRAOCULAR LENS IMPLANT Right 2/20/2024    Procedure: EXTRACTION, CATARACT, WITH IOL INSERTION;  Surgeon: Emeka Bingham MD;  Location: [facility];  Service: Ophthalmology;  Laterality: Right;    NASAL SINUS SURGERY      TONSILLECTOMY     Pre-op Assessment       I have reviewed the Medications.     Review of Systems  Anesthesia Hx:             Denies Family Hx of Anesthesia complications.     Pulmonary:    Asthma                        Physical Exam  General: Well nourished    Airway:  Mallampati: II   TM Distance: Normal  Neck ROM: Normal ROM    Dental:  Intact    Chest/Lungs:  Clear to auscultation    Heart:  Rate: Normal  Rhythm: Regular Rhythm        Anesthesia Plan  Type of Anesthesia, risks & benefits discussed:    Anesthesia Type: Gen ETT  Intra-op Monitoring Plan: Standard ASA Monitors  Post Op Pain Control Plan: multimodal analgesia  Informed Consent: Informed consent signed with the Patient and all parties understand the risks and agree with anesthesia plan.   All questions answered.   ASA Score: 2    Ready For Surgery From Anesthesia Perspective.     .

## 2024-04-15 ENCOUNTER — ANESTHESIA (OUTPATIENT)
Dept: SURGERY | Facility: HOSPITAL | Age: 69
End: 2024-04-15
Payer: MEDICARE

## 2024-04-15 ENCOUNTER — TELEPHONE (OUTPATIENT)
Dept: OTOLARYNGOLOGY | Facility: CLINIC | Age: 69
End: 2024-04-15
Payer: MEDICARE

## 2024-04-15 ENCOUNTER — HOSPITAL ENCOUNTER (OUTPATIENT)
Facility: HOSPITAL | Age: 69
Discharge: HOME | End: 2024-04-15
Attending: OTOLARYNGOLOGY | Admitting: OTOLARYNGOLOGY
Payer: MEDICARE

## 2024-04-15 VITALS
BODY MASS INDEX: 25.05 KG/M2 | OXYGEN SATURATION: 95 % | TEMPERATURE: 97 F | SYSTOLIC BLOOD PRESSURE: 117 MMHG | DIASTOLIC BLOOD PRESSURE: 64 MMHG | HEIGHT: 73 IN | RESPIRATION RATE: 18 BRPM | HEART RATE: 76 BPM | WEIGHT: 189 LBS

## 2024-04-15 DIAGNOSIS — J32.4 CHRONIC PANSINUSITIS: Primary | ICD-10-CM

## 2024-04-15 DIAGNOSIS — J32.9 SINUSITIS: ICD-10-CM

## 2024-04-15 PROCEDURE — 63600175 PHARM REV CODE 636 W HCPCS: Performed by: ANESTHESIOLOGY

## 2024-04-15 PROCEDURE — 37000008 HC ANESTHESIA 1ST 15 MINUTES: Performed by: OTOLARYNGOLOGY

## 2024-04-15 PROCEDURE — 88305 TISSUE EXAM BY PATHOLOGIST: CPT | Performed by: STUDENT IN AN ORGANIZED HEALTH CARE EDUCATION/TRAINING PROGRAM

## 2024-04-15 PROCEDURE — C2625 STENT, NON-COR, TEM W/DEL SY: HCPCS | Performed by: OTOLARYNGOLOGY

## 2024-04-15 PROCEDURE — 36000711: Performed by: OTOLARYNGOLOGY

## 2024-04-15 PROCEDURE — C9046 COCAINE HCL NASAL SOLUTION: HCPCS | Performed by: OTOLARYNGOLOGY

## 2024-04-15 PROCEDURE — 31276 NSL/SINS NDSC FRNT TISS RMVL: CPT | Mod: 50,51,, | Performed by: OTOLARYNGOLOGY

## 2024-04-15 PROCEDURE — 99900035 HC TECH TIME PER 15 MIN (STAT)

## 2024-04-15 PROCEDURE — 25000003 PHARM REV CODE 250: Performed by: ANESTHESIOLOGY

## 2024-04-15 PROCEDURE — 63600175 PHARM REV CODE 636 W HCPCS: Performed by: NURSE ANESTHETIST, CERTIFIED REGISTERED

## 2024-04-15 PROCEDURE — 63600175 PHARM REV CODE 636 W HCPCS: Performed by: OTOLARYNGOLOGY

## 2024-04-15 PROCEDURE — 94761 N-INVAS EAR/PLS OXIMETRY MLT: CPT

## 2024-04-15 PROCEDURE — 30140 RESECT INFERIOR TURBINATE: CPT | Mod: 50,51,, | Performed by: OTOLARYNGOLOGY

## 2024-04-15 PROCEDURE — 31267 ENDOSCOPY MAXILLARY SINUS: CPT | Mod: 50,51,, | Performed by: OTOLARYNGOLOGY

## 2024-04-15 PROCEDURE — 27201423 OPTIME MED/SURG SUP & DEVICES STERILE SUPPLY: Performed by: OTOLARYNGOLOGY

## 2024-04-15 PROCEDURE — 25000003 PHARM REV CODE 250: Performed by: NURSE ANESTHETIST, CERTIFIED REGISTERED

## 2024-04-15 PROCEDURE — 61782 SCAN PROC CRANIAL EXTRA: CPT | Mod: ,,, | Performed by: OTOLARYNGOLOGY

## 2024-04-15 PROCEDURE — 71000016 HC POSTOP RECOV ADDL HR: Performed by: OTOLARYNGOLOGY

## 2024-04-15 PROCEDURE — 31259 NSL/SINS NDSC SPHN TISS RMVL: CPT | Mod: 50,,, | Performed by: OTOLARYNGOLOGY

## 2024-04-15 PROCEDURE — 88305 TISSUE EXAM BY PATHOLOGIST: CPT | Mod: 26,,, | Performed by: STUDENT IN AN ORGANIZED HEALTH CARE EDUCATION/TRAINING PROGRAM

## 2024-04-15 PROCEDURE — 71000033 HC RECOVERY, INTIAL HOUR: Performed by: OTOLARYNGOLOGY

## 2024-04-15 PROCEDURE — D9220A PRA ANESTHESIA: Mod: ,,, | Performed by: NURSE ANESTHETIST, CERTIFIED REGISTERED

## 2024-04-15 PROCEDURE — 37000009 HC ANESTHESIA EA ADD 15 MINS: Performed by: OTOLARYNGOLOGY

## 2024-04-15 PROCEDURE — 36000710: Performed by: OTOLARYNGOLOGY

## 2024-04-15 PROCEDURE — 25000003 PHARM REV CODE 250: Performed by: OTOLARYNGOLOGY

## 2024-04-15 PROCEDURE — 71000015 HC POSTOP RECOV 1ST HR: Performed by: OTOLARYNGOLOGY

## 2024-04-15 DEVICE — PROPEL SINUS IMPLANT
Type: IMPLANTABLE DEVICE | Site: NOSE | Status: FUNCTIONAL
Brand: PROPEL

## 2024-04-15 RX ORDER — OXYMETAZOLINE HCL 0.05 %
2 SPRAY, NON-AEROSOL (ML) NASAL ONCE
Status: COMPLETED | OUTPATIENT
Start: 2024-04-15 | End: 2024-04-15

## 2024-04-15 RX ORDER — LIDOCAINE HYDROCHLORIDE 20 MG/ML
INJECTION INTRAVENOUS
Status: DISCONTINUED | OUTPATIENT
Start: 2024-04-15 | End: 2024-04-15

## 2024-04-15 RX ORDER — MIDAZOLAM HYDROCHLORIDE 1 MG/ML
INJECTION INTRAMUSCULAR; INTRAVENOUS
Status: DISCONTINUED | OUTPATIENT
Start: 2024-04-15 | End: 2024-04-15

## 2024-04-15 RX ORDER — ONDANSETRON HYDROCHLORIDE 2 MG/ML
4 INJECTION, SOLUTION INTRAVENOUS ONCE AS NEEDED
Status: DISCONTINUED | OUTPATIENT
Start: 2024-04-15 | End: 2024-04-15 | Stop reason: HOSPADM

## 2024-04-15 RX ORDER — EPHEDRINE SULFATE 50 MG/ML
INJECTION, SOLUTION INTRAVENOUS
Status: DISCONTINUED | OUTPATIENT
Start: 2024-04-15 | End: 2024-04-15

## 2024-04-15 RX ORDER — PHENYLEPHRINE HCL IN 0.9% NACL 1 MG/10 ML
SYRINGE (ML) INTRAVENOUS
Status: DISCONTINUED | OUTPATIENT
Start: 2024-04-15 | End: 2024-04-15

## 2024-04-15 RX ORDER — LIDOCAINE HYDROCHLORIDE 10 MG/ML
1 INJECTION, SOLUTION EPIDURAL; INFILTRATION; INTRACAUDAL; PERINEURAL ONCE AS NEEDED
Status: DISCONTINUED | OUTPATIENT
Start: 2024-04-15 | End: 2024-04-15 | Stop reason: HOSPADM

## 2024-04-15 RX ORDER — LIDOCAINE HYDROCHLORIDE AND EPINEPHRINE 10; 10 MG/ML; UG/ML
INJECTION, SOLUTION INFILTRATION; PERINEURAL
Status: DISCONTINUED | OUTPATIENT
Start: 2024-04-15 | End: 2024-04-15 | Stop reason: HOSPADM

## 2024-04-15 RX ORDER — ACETAMINOPHEN 10 MG/ML
INJECTION, SOLUTION INTRAVENOUS
Status: DISCONTINUED | OUTPATIENT
Start: 2024-04-15 | End: 2024-04-15

## 2024-04-15 RX ORDER — PROPOFOL 10 MG/ML
VIAL (ML) INTRAVENOUS CONTINUOUS PRN
Status: DISCONTINUED | OUTPATIENT
Start: 2024-04-15 | End: 2024-04-15

## 2024-04-15 RX ORDER — PROPOFOL 10 MG/ML
VIAL (ML) INTRAVENOUS
Status: DISCONTINUED | OUTPATIENT
Start: 2024-04-15 | End: 2024-04-15

## 2024-04-15 RX ORDER — ONDANSETRON HYDROCHLORIDE 2 MG/ML
INJECTION, SOLUTION INTRAVENOUS
Status: DISCONTINUED | OUTPATIENT
Start: 2024-04-15 | End: 2024-04-15

## 2024-04-15 RX ORDER — FENTANYL CITRATE 50 UG/ML
INJECTION, SOLUTION INTRAMUSCULAR; INTRAVENOUS
Status: DISCONTINUED | OUTPATIENT
Start: 2024-04-15 | End: 2024-04-15

## 2024-04-15 RX ORDER — HYDROMORPHONE HYDROCHLORIDE 1 MG/ML
0.5 INJECTION, SOLUTION INTRAMUSCULAR; INTRAVENOUS; SUBCUTANEOUS EVERY 5 MIN PRN
Status: DISCONTINUED | OUTPATIENT
Start: 2024-04-15 | End: 2024-04-15 | Stop reason: HOSPADM

## 2024-04-15 RX ORDER — DEXAMETHASONE SODIUM PHOSPHATE 4 MG/ML
INJECTION, SOLUTION INTRA-ARTICULAR; INTRALESIONAL; INTRAMUSCULAR; INTRAVENOUS; SOFT TISSUE
Status: DISCONTINUED | OUTPATIENT
Start: 2024-04-15 | End: 2024-04-15

## 2024-04-15 RX ORDER — ROCURONIUM BROMIDE 10 MG/ML
INJECTION, SOLUTION INTRAVENOUS
Status: DISCONTINUED | OUTPATIENT
Start: 2024-04-15 | End: 2024-04-15

## 2024-04-15 RX ORDER — MUPIROCIN 20 MG/G
OINTMENT TOPICAL
Status: DISCONTINUED | OUTPATIENT
Start: 2024-04-15 | End: 2024-04-15 | Stop reason: HOSPADM

## 2024-04-15 RX ORDER — EPINEPHRINE 1 MG/ML
INJECTION, SOLUTION, CONCENTRATE INTRAVENOUS
Status: DISCONTINUED | OUTPATIENT
Start: 2024-04-15 | End: 2024-04-15 | Stop reason: HOSPADM

## 2024-04-15 RX ORDER — SUCCINYLCHOLINE CHLORIDE 20 MG/ML
INJECTION INTRAMUSCULAR; INTRAVENOUS
Status: DISCONTINUED | OUTPATIENT
Start: 2024-04-15 | End: 2024-04-15

## 2024-04-15 RX ORDER — SODIUM CHLORIDE 0.9 % (FLUSH) 0.9 %
10 SYRINGE (ML) INJECTION
Status: DISCONTINUED | OUTPATIENT
Start: 2024-04-15 | End: 2024-04-15 | Stop reason: HOSPADM

## 2024-04-15 RX ORDER — COCAINE HYDROCHLORIDE 40 MG/ML
SOLUTION NASAL
Status: DISCONTINUED | OUTPATIENT
Start: 2024-04-15 | End: 2024-04-15 | Stop reason: HOSPADM

## 2024-04-15 RX ORDER — SODIUM CHLORIDE 9 MG/ML
INJECTION, SOLUTION INTRAVENOUS CONTINUOUS
Status: DISCONTINUED | OUTPATIENT
Start: 2024-04-15 | End: 2024-04-15 | Stop reason: HOSPADM

## 2024-04-15 RX ORDER — ONDANSETRON 4 MG/1
4 TABLET, FILM COATED ORAL EVERY 8 HOURS PRN
Qty: 5 TABLET | Refills: 0 | Status: SHIPPED | OUTPATIENT
Start: 2024-04-15

## 2024-04-15 RX ORDER — REMIFENTANIL HYDROCHLORIDE 1 MG/ML
INJECTION, POWDER, LYOPHILIZED, FOR SOLUTION INTRAVENOUS CONTINUOUS PRN
Status: DISCONTINUED | OUTPATIENT
Start: 2024-04-15 | End: 2024-04-15

## 2024-04-15 RX ORDER — ACETAMINOPHEN 325 MG/1
650 TABLET ORAL EVERY 6 HOURS PRN
Qty: 30 TABLET | Refills: 0 | Status: SHIPPED | OUTPATIENT
Start: 2024-04-15

## 2024-04-15 RX ADMIN — ACETAMINOPHEN 1000 MG: 10 INJECTION INTRAVENOUS at 12:04

## 2024-04-15 RX ADMIN — SODIUM CHLORIDE: 0.9 INJECTION, SOLUTION INTRAVENOUS at 10:04

## 2024-04-15 RX ADMIN — MIDAZOLAM 2 MG: 1 INJECTION INTRAMUSCULAR; INTRAVENOUS at 11:04

## 2024-04-15 RX ADMIN — ROCURONIUM 5 MG: 50 INJECTION, SOLUTION INTRAVENOUS at 11:04

## 2024-04-15 RX ADMIN — LIDOCAINE HYDROCHLORIDE 80 MG: 20 INJECTION INTRAVENOUS at 11:04

## 2024-04-15 RX ADMIN — HYDROMORPHONE HYDROCHLORIDE 0.5 MG: 1 INJECTION, SOLUTION INTRAMUSCULAR; INTRAVENOUS; SUBCUTANEOUS at 02:04

## 2024-04-15 RX ADMIN — SUCCINYLCHOLINE CHLORIDE 100 MG: 20 INJECTION, SOLUTION INTRAMUSCULAR; INTRAVENOUS at 11:04

## 2024-04-15 RX ADMIN — PROPOFOL 130 MG: 10 INJECTION, EMULSION INTRAVENOUS at 11:04

## 2024-04-15 RX ADMIN — Medication 100 MCG: at 11:04

## 2024-04-15 RX ADMIN — GLYCOPYRROLATE 0.1 MG: 0.2 INJECTION INTRAMUSCULAR; INTRAVENOUS at 11:04

## 2024-04-15 RX ADMIN — SODIUM CHLORIDE: 9 INJECTION, SOLUTION INTRAVENOUS at 09:04

## 2024-04-15 RX ADMIN — DEXAMETHASONE SODIUM PHOSPHATE 12 MG: 4 INJECTION INTRA-ARTICULAR; INTRALESIONAL; INTRAMUSCULAR; INTRAVENOUS; SOFT TISSUE at 11:04

## 2024-04-15 RX ADMIN — ONDANSETRON 4 MG: 2 INJECTION INTRAMUSCULAR; INTRAVENOUS at 11:04

## 2024-04-15 RX ADMIN — EPHEDRINE SULFATE 10 MG: 50 INJECTION INTRAVENOUS at 12:04

## 2024-04-15 RX ADMIN — FENTANYL CITRATE 100 MCG: 50 INJECTION INTRAMUSCULAR; INTRAVENOUS at 11:04

## 2024-04-15 RX ADMIN — Medication 2 SPRAY: at 03:04

## 2024-04-15 RX ADMIN — PROPOFOL 150 MCG/KG/MIN: 10 INJECTION, EMULSION INTRAVENOUS at 11:04

## 2024-04-15 RX ADMIN — SODIUM CHLORIDE: 0.9 INJECTION, SOLUTION INTRAVENOUS at 12:04

## 2024-04-15 RX ADMIN — REMIFENTANIL HYDROCHLORIDE 0.08 MCG/KG/MIN: 1 INJECTION, POWDER, LYOPHILIZED, FOR SOLUTION INTRAVENOUS at 11:04

## 2024-04-15 NOTE — BRIEF OP NOTE
[facility] ([facility])  Brief Operative Note    Surgery Date: 4/15/2024     Surgeons and Role:     * Fredrick Bailey MD - Primary    Assisting Surgeon: None    Pre-op Diagnosis:  Other chronic sinusitis [J32.8]  Nasal turbinate hypertrophy [J34.3]  Nasal polyposis [J33.9]    Post-op Diagnosis:  Post-Op Diagnosis Codes:     * Other chronic sinusitis [J32.8]     * Nasal turbinate hypertrophy [J34.3]     * Nasal polyposis [J33.9]    Procedure(s) (LRB):  FESS, USING COMPUTER-ASSISTED NAVIGATION, WITH NASAL TURBINATE REDUCTION (Bilateral)    Anesthesia: General    Operative Findings: Diffuse polyposis involving all sinuses.  Compound inferior turbinate hypertrophy.    Estimated Blood Loss: * No values recorded between 4/15/2024 12:04 PM and 4/15/2024  1:14 PM *         Specimens:   Specimen (24h ago, onward)       Start     Ordered    04/15/24 1257  Specimen to Pathology, Surgery ENT  Once        Comments: Pre-op Diagnosis: Other chronic sinusitis [J32.8]Nasal turbinate hypertrophy [J34.3]Nasal polyposis [J33.9]Procedure(s):FESS, USING COMPUTER-ASSISTED NAVIGATION, WITH NASAL TURBINATE REDUCTION Number of specimens: 3Name of specimens: 1) Left Ethmoid sinus  contents2) Right Ethmoid  sinus contents 3)Left Maxilllary sinus contents     References:    Click here for ordering Quick Tip   Question Answer Comment   Procedure Type: ENT    Which provider would you like to cc? FREDRICK BAILEY    Release to patient Immediate        04/15/24 1302                      Discharge Note    OUTCOME: Patient tolerated treatment/procedure well without complication and is now ready for discharge.    DISPOSITION: Home or Self Care    FINAL DIAGNOSIS:  Chronic pansinusitis    FOLLOWUP: In clinic    DISCHARGE INSTRUCTIONS:  No discharge procedures on file.

## 2024-04-15 NOTE — PLAN OF CARE
Patient AAOx3. Consents need to be signed, H&P needed, ride verified, all concerns addressed. Patient has no complaints at this time.

## 2024-04-15 NOTE — TRANSFER OF CARE
"Anesthesia Transfer of Care Note    Patient: Saba Hansen    Procedure(s) Performed: Procedure(s) (LRB):  FESS, USING COMPUTER-ASSISTED NAVIGATION, WITH NASAL TURBINATE REDUCTION (Bilateral)    Patient location: PACU    Anesthesia Type: general    Transport from OR: Transported from OR on 6-10 L/min O2 by face mask with adequate spontaneous ventilation    Post pain: adequate analgesia    Post assessment: no apparent anesthetic complications    Post vital signs: stable    Level of consciousness: sedated    Nausea/Vomiting: no nausea/vomiting    Complications: none    Transfer of care protocol was followed      Last vitals: Visit Vitals  /71 (BP Location: Left arm, Patient Position: Lying)   Pulse 73   Temp 36.6 °C (97.9 °F) (Temporal)   Resp 18   Ht 6' 1" (1.854 m)   Wt 85.7 kg (189 lb)   SpO2 97%   BMI 24.94 kg/m²     "

## 2024-04-15 NOTE — ANESTHESIA PROCEDURE NOTES
Intubation    Date/Time: 4/15/2024 11:35 AM    Performed by: Surjit Brown CRNA  Authorized by: Dinora Carrasquillo MD    Intubation:     Induction:  Intravenous    Intubated:  Postinduction    Mask Ventilation:  Easy mask    Attempts:  1    Attempted By:  CRNA    Method of Intubation:  Video laryngoscopy    Blade:  Yadav 4    Laryngeal View Grade: Grade I - full view of cords      Difficult Airway Encountered?: No      Complications:  None    Airway Device:  Oral endotracheal tube    Airway Device Size:  8.0    Inflation Amount (mL):  7    Secured at:  The lips    Placement Verified By:  Capnometry    Complicating Factors:  None    Findings Post-Intubation:  BS equal bilateral

## 2024-04-15 NOTE — PLAN OF CARE
Pt arrived to recovery dosc via stretcher per OR team. Bedside report received. Pt attached to bedside monitor. VSS. Pt _responds to voice__ post procedure. Pt on __0__L of oxygen via _RA__; oxygen sats _95___%. Pt IV access infusing with NS. Will continue to monitor.

## 2024-04-15 NOTE — TELEPHONE ENCOUNTER
----- Message from Leonor Morales sent at 4/13/2024 11:39 AM CDT -----  Regarding: Appt Scheduled  Contact: Patient  Patient is requesting a call back in reference to procedure scheduled for 04/15/2024   Patient would like to know what time to be there   Please Assist     Patient can be reached at  [phone]

## 2024-04-15 NOTE — DISCHARGE INSTRUCTIONS
INSTRUCTIONS TO FOLLOW AFTER SINUS AND NASAL SURGERY  DR. McCOUL - [facility]    Office hours:  Weekdays 8:00 am to 5:00 pm.  Please call [phone] and ask to speak with his nurse or medical assistant.    After-hours & weekends:  Please call [phone] and ask to speak with the ENT resident doctor.    Your first office visit with Dr. Botello after surgery should have been already scheduled.  If you dont know when it is, call Dr. Marsh nurse or medical assistant at [phone].    Please call immediately if you have:    Temperature of 101° F or greater  Any unusual, painful swelling  Any active bleeding that saturates more than a 4x4 gauze  Any thick drainage green or yellow drainage  Changes in vision or swelling around the eye  Pain not relieved by your prescribed pain medication    ACTIVITY:    Sleep on your back with the head of the bead elevated, up on 2-3 pillows, or in a recliner for the first 3 to 5 days. This will help with swelling.     After surgery you may have a lot of nasal drainage. This is normal. You may breathe through your nose if youre able but avoid inhaling forcibly. Let all drainage fall on your mustache dressing and change it as needed.    You may wake up after surgery with thick white stockings on. Wear them until you are walking around more. It is important to walk around often while at home to keep your blood circulating and prevent blood clots.    If you use CPAP or BiPAP to sleep at night, you should wait at least 48 hours before resuming use. Dr. Botelol will advise you when it is safe to do this.    You may shower 24 hours after surgery.    RESTRICTED ACTIVITIES AFTER SURGERY:    DO NOT blow your nose for 2 weeks. The only exception is that you may lightly blow your nose after using the sinus rinse. If you have to sneeze or cough, do so with your mouth open.     AVOID all heavy lifting, straining or bending for 2 weeks.     AVOID any sexual activity for 2 weeks after  surgery.    AVOID semi-contact sports or vigorous exercising for 2 weeks. Dr. Botello will let you know when you are cleared to resume exercise.    AVOID flying or swimming for 2 weeks.      DO NOT operate a motor vehicle or any type of heavy machinery within 24 hours of taking prescription pain medication.    DO NOT smoke or be around smokers.    AVOID irritating substances that might make you sneeze, such as dust, chalk, harsh chemicals, and allergic triggers. This might also include spicy foods.    DRESSINGS:    Change the gauze mustache dressing under your nose as needed. (If unsure what this dressing is or how to do this, ask your doctor or nurse before you leave the hospital.) You may have pinkish-red drainage for 2-3 days.    Usually there is no gauze packing placed inside the nose.  If packing is necessary, you will be informed by your surgeon.  Do not touch or pull at the packing. The packing will be removed by your doctor at your first visit after surgery.     You may also have a dissolvable stent or dissolvable sponge placed into the sinuses during surgery.  These usually do not need to be removed unless you are told otherwise by Dr. Botello.  You may notice small fragments of these items come out of your nose in the weeks following surgery.    MEDICATIONS:    After surgery, you will be sent home with prescriptions for pain medication and an anti-nausea medication. Antibiotics are usually not necessary.    Most people need pain medication for the first few days after surgery, although a narcotic is rarely necessary. The best pain control comes from a combination of ACETAMINOPHEN (Tylenol) and IBUPROFEN (Motrin). You will be given prescriptions for these at the recommended dose. These can be alternated so that you are taking something every 2 or 3 hours.    Some people have problems with bowel movements after surgery. If you have NOT had a bowel movement 3-5 days after surgery, go to your local pharmacy and  purchase an over the counter stool softener such as COLACE. You can also ask the pharmacist for his or her recommendation. If you still do not have a bowel movement after starting the softener, please call the office.    You may be given an ointment called MUPIROCIN to use after surgery. If you are given this, use a cotton swab to apply gently inside the nostrils. Do this 2 times a day for 1 week.    You will need these over-the-counter medications after surgery:    SALINE SINUS RINSE (Arsenio Med brand): You will use this to rinse out your nose and sinuses after surgery. Begin doing this the day after surgery, unless instructed otherwise by Dr. Botello.  You should do this 2 times a day, following the instructions on the box.    AFRIN (regular strength): Only use if you have nasal congestion or bleeding. Use 2 times per day for 3 days, stop for 1 day, continue 2 times per day for 3 days, then stop completely.  NOTE:  You may not need to do this at all.    DIET:    Avoid hot and spicy foods for 1 week after surgery.    Begin with bland foods the evening after surgery and advance to your regular diet as tolerated. It is not necessary to take only soft food unless you are recovering from tonsil surgery.    Drink plenty of fluids (water is best).     Avoid alcoholic and caffeinated beverages for 1 week after surgery because they can cause you to become dehydrated.

## 2024-04-15 NOTE — ANESTHESIA POSTPROCEDURE EVALUATION
Anesthesia Post Evaluation    Patient: Saba Hansen    Procedure(s) Performed: Procedure(s) (LRB):  FESS, USING COMPUTER-ASSISTED NAVIGATION, WITH NASAL TURBINATE REDUCTION (Bilateral)    Final Anesthesia Type: general      Patient location during evaluation: PACU  Patient participation: Yes- Able to Participate  Level of consciousness: awake and alert  Post-procedure vital signs: reviewed and stable  Pain management: adequate  Airway patency: patent    PONV status at discharge: No PONV  Anesthetic complications: no      Cardiovascular status: blood pressure returned to baseline  Respiratory status: unassisted  Hydration status: euvolemic  Follow-up not needed.          Vitals Value Taken Time   /61 04/15/24 1432   Temp 36.2 °C (97.2 °F) 04/15/24 1315   Pulse 70 04/15/24 1436   Resp 19 04/15/24 1436   SpO2 96 % 04/15/24 1436   Vitals shown include unfiled device data.      Event Time   Out of Recovery 13:30:00         Pain/Gregory Score: Gregory Score: 10 (4/15/2024  1:30 PM)

## 2024-04-15 NOTE — INTERVAL H&P NOTE
No significant changes.  I discussed the risks, benefits and alternatives to surgery with the patient, as well as the expected postoperative course.  I gave him the opportunity to ask questions and I answered all of them, and he consented to proceed.

## 2024-04-15 NOTE — OP NOTE
DATE OF OPERATION: 4/15/2024    SURGEON:  Rm Botello MD     ASSISTANT SURGEON:  None     OPERATION:     1. Bilateral image-guided revision endoscopic total ethmoidectomy.  2. Bilateral image-guided revision endoscopic maxillary antrostomy.  3. Bilateral image-guided revision endoscopic sphenoidotomy.  4. Bilateral image-guided endoscopic frontal dissection with Draf IIA sinusotomy.  5. Bilateral inferior turbinate reduction with submucosal resection.     PREOPERATIVE DIAGNOSIS:      1. Chronic rhinosinusitis.  2. Hypertrophic turbinates.  3. Sinonasal polyposis.     POSTOPERATIVE DIAGNOSIS:      1. Chronic rhinosinusitis.  2. Hypertrophic turbinates.   3. Sinonasal polyposis.     ANESTHESIA: Total intravenous general anesthesia.     COMPLICATIONS: None.     ESTIMATED BLOOD LOSS: 100 mL     SPECIMEN: Bilateral ethmoid.  Left maxillary sinus contents.     WOUND EXPECTANCY: Clean-contaminated.    DRESSING: Propel in the bilateral middle meatus. No nasal packing.     FINDINGS: Diffuse polyposis involving all sinuses.  Compound inferior turbinate hypertrophy.     INDICATIONS: Chronic rhinosinusitis and nasal obstruction, not controlled with maximal medical therapy.     I discussed the risks, benefits and alternatives of surgical correction of the chronically obstructed sinuses and associated turbinate hypertrophy with the patient as well as the expected postoperative course. I gave him the opportunity to ask questions and I answered all of them. On the morning of surgery I again met with the patient and reviewed the indications for surgery and he consented to proceed.     DESCRIPTION OF PROCEDURE: The patient was brought to the operating room and placed supine on the operating table. The patient was placed under general anesthesia and intubated. The patient was positioned with a donut under the head and the image-guidance headset for the [employer]onic Fusion system was applied.  Image-guided navigation was indicated to  facilitate exenteration of all ethmoid cells and the extent of sinusotomy.  The CT scan disc was loaded into the image-guidance system and registered with the patient tracking system according to the 's instructions. The pointer was calibrated and registration was verified using predefined landmarks.  Cottonoid pledgets soaked with 4% cocaine was placed into the nasal cavity bilaterally for mucosal decongestion. Prophylactic antibiotics were not indicated prior to the surgery start. A time-out was performed to confirm the proper patient, site and procedure. The CT images were again reviewed prior to surgical start.  The bed was placed in 15-degree reverse Trendelenberg position. The patient was prepped and draped in the usual fashion.     A 0-degree endoscope was used to examine the nasal cavity.  Local injections of 1% lidocaine with 1:100,000 parts epinephrine were then performed at the sphenopalatine ganglion region bilaterally.    Inferior turbinate reduction was then performed.  Incisions were made in the anterior head of the inferior turbinate using a #6400 blade.  A Lanier elevator was then tunnelled medially to the turbinate bone and used to segmentally outfracture and morselize the bone.  Then, a 2 mm blade on the powered tissue shaver was tunneled submucosally and used to resect soft tissue of the turbinate while overlying mucosa was preserved.   Finally, the turbinates were outfractured using a Jensen/Boies elevator.  An identical procedure was performed bilaterally.     Attention was then turned to the sinuses. Large polyps were found to be protruding into the nasal cavity and were extracted and sent to the pathologist. The left middle meatus was topically decongested using pledgets containing 10,000 units of thrombin with 1:10,000 parts epinephrine. The middle turbinate appeared intact.   The uncinate process had been previously resected.     The previous maxillary sinus antrostomy was  patent but was occluded by polyps, which were removed. This was entered using a curved suction to confirm the dimension of the lumen. The antrostomy was enlarged using cutting instruments, taking care not to move anterior to the maxillary line so as to avoid injury to the nasolacrimal duct. Polypoid tissue was present within the maxillary sinus. This was thoroughly removed and sent for pathologic evaluation.     The ethmoid bulla had been previously resected.  Several bony partitions were present from the skull base which were then removed with cutting instruments.  Throughout the labyrinth prominent polyps were encountered, which were removed to complete a total ethmoidectomy.  An Onodi cell was not present.  The mucosa was hypertrophic throughout the sinuses, indicative of chronic inflammation.  Topical hemostatic agents on pledgets were then placed into the ethmoid cavity for hemostasis.    The sphenoid sinus rostrum was then identified.  Prior sphenoidotomy was apparent but occluded by polyps, which were then removed. The sinus was entered in a low and medial fashion, adjacent to the superior turbinate. This sphenoidotomy was enlarged to include the posterior segment of this superior turbinate and the natural ostium of the sphenoid sinus. Polypoid tissue  was present within the sphenoid sinus. This was thoroughly removed and sent for pathologic evaluation.     Dissection was performed at the site of the nasofrontal recess. Using a 70-degree scope, a suprabullar cell was dissected and uncapped in a medial-to-lateral direction.  An agger nasi cell was not present.  Additional frontal cells were not present.  Afterward, the frontal sinus ostium was visible but stenotic.  Therefore, the ostium was enlarged anteriorly using cutting instruments, taking care to avoid circumerential mucosal injury.  Powered instrumentation was not required.  The floor of the frontal sinus was removed between the lamina of the orbit and  the suspension of the middle turbinate to complete a Draf IIA sinusotomy.  The anterior ethmoidal artery was identified and avoided with assistance from the image-guidance system probe.  At the conclusion of dissection there was excellent visualization into the frontal sinus, which would not otherwise have been possible.     Attention was then turned to the right side of the sinonasal tract.  A similar procedure was performed on this side, including maxillary antrostomy, total ethmoidectomy, sphenoidotomy and frontal sinus dissection.     At the conclusion of these procedures, the image-guidance probe was used to verify that all cells had been properly opened and that the skull base was visible and that the lamina papyracea had not been traversed.  All sinuses were copiously irrigated with warm normal saline solution.     At this point, the pledgets were all removed. Korin hemostatic agent was placed into the surgical sites. A Propel steroid-eluting stent was placed into the bilateral ethmoid cavities.  The nasal cavity was not packed.  Mupirocin ointment was applied to the vestibule bilaterally.  At this point, the headset was removed and the drapes were taken down. The patient was turned back toward the anesthesiologist and awakened from anesthesia, extubated and transferred to the recovery room in stable condition.

## 2024-04-22 LAB
FINAL PATHOLOGIC DIAGNOSIS: NORMAL
GROSS: NORMAL
Lab: NORMAL
MICROSCOPIC EXAM: NORMAL

## 2024-04-25 ENCOUNTER — OFFICE VISIT (OUTPATIENT)
Dept: OTOLARYNGOLOGY | Facility: CLINIC | Age: 69
End: 2024-04-25
Payer: MEDICARE

## 2024-04-25 VITALS
DIASTOLIC BLOOD PRESSURE: 74 MMHG | BODY MASS INDEX: 24.99 KG/M2 | WEIGHT: 189.38 LBS | HEART RATE: 71 BPM | SYSTOLIC BLOOD PRESSURE: 118 MMHG

## 2024-04-25 DIAGNOSIS — J32.8 OTHER CHRONIC SINUSITIS: Primary | ICD-10-CM

## 2024-04-25 DIAGNOSIS — J45.40 MODERATE PERSISTENT ASTHMA WITHOUT COMPLICATION: ICD-10-CM

## 2024-04-25 DIAGNOSIS — J33.9 NASAL POLYPOSIS: ICD-10-CM

## 2024-04-25 PROCEDURE — 99999 PR PBB SHADOW E&M-EST. PATIENT-LVL III: CPT | Mod: PBBFAC,,, | Performed by: OTOLARYNGOLOGY

## 2024-04-25 PROCEDURE — 3074F SYST BP LT 130 MM HG: CPT | Mod: CPTII,S$GLB,, | Performed by: OTOLARYNGOLOGY

## 2024-04-25 PROCEDURE — 1160F RVW MEDS BY RX/DR IN RCRD: CPT | Mod: CPTII,S$GLB,, | Performed by: OTOLARYNGOLOGY

## 2024-04-25 PROCEDURE — 3078F DIAST BP <80 MM HG: CPT | Mod: CPTII,S$GLB,, | Performed by: OTOLARYNGOLOGY

## 2024-04-25 PROCEDURE — 3008F BODY MASS INDEX DOCD: CPT | Mod: CPTII,S$GLB,, | Performed by: OTOLARYNGOLOGY

## 2024-04-25 PROCEDURE — 1126F AMNT PAIN NOTED NONE PRSNT: CPT | Mod: CPTII,S$GLB,, | Performed by: OTOLARYNGOLOGY

## 2024-04-25 PROCEDURE — 99214 OFFICE O/P EST MOD 30 MIN: CPT | Mod: 25,S$GLB,, | Performed by: OTOLARYNGOLOGY

## 2024-04-25 PROCEDURE — 1101F PT FALLS ASSESS-DOCD LE1/YR: CPT | Mod: CPTII,S$GLB,, | Performed by: OTOLARYNGOLOGY

## 2024-04-25 PROCEDURE — 3288F FALL RISK ASSESSMENT DOCD: CPT | Mod: CPTII,S$GLB,, | Performed by: OTOLARYNGOLOGY

## 2024-04-25 PROCEDURE — 31237 NSL/SINS NDSC SURG BX POLYPC: CPT | Mod: 50,S$GLB,, | Performed by: OTOLARYNGOLOGY

## 2024-04-25 PROCEDURE — 1159F MED LIST DOCD IN RCRD: CPT | Mod: CPTII,S$GLB,, | Performed by: OTOLARYNGOLOGY

## 2024-04-25 RX ORDER — BUDESONIDE 0.5 MG/2ML
0.5 INHALANT ORAL DAILY
Qty: 180 ML | Refills: 1 | Status: SHIPPED | OUTPATIENT
Start: 2024-04-25 | End: 2024-04-26

## 2024-04-25 NOTE — PROCEDURES
Nasal/sinus endoscopy    Date/Time: 4/25/2024 10:30 AM    Performed by: Rm Botello MD  Authorized by: Rm Botello MD    Anesthesia:     Local anesthetic:  Lidocaine 4% and Matthias-Synephrine 1/2%    Patient tolerance:  Patient tolerated the procedure well with no immediate complications  Nose:     Procedure Performed:  Removal of Debridement  External:      No external nasal deformity  Intranasal:      Mucosa no polyps     Mucosa ulcers not present     No mucosa lesions present     Turbinates not enlarged     No septum gross deformity  Nasopharynx:      No mucosa lesions     Adenoids not present     Posterior choanae patent     Eustachian tube not patent     Debridement of both ethmoid cavities performed with Macias forceps.  Sinuses otherwise patent.  Propel stents dissolving and removed.

## 2024-04-25 NOTE — PROGRESS NOTES
Subjective:      Saba Hansen is a 69 y.o. male who comes for follow-up 1 week status-post endoscopic sinus surgery.  His last visit with me was on 3/19/2024.  Doing well, breathing well, no pain or bleeding, using saline rinse.        %           Objective:     /74 (BP Location: Right arm, Patient Position: Sitting, BP Method: Large (Automatic))   Pulse 71   Wt 85.9 kg (189 lb 6 oz)   BMI 24.99 kg/m²      General:   not in distress   Nasal:  edematous mucosa   no septal hematoma   no bleeding   Oral Cavity:   clear   Oropharynx:   no bleeding   Neck:   nontender       Procedure    Endoscopic debridement performed.  See procedure note.        Data Reviewed    Pathology report indicated chronic inflammation with eosinophilia.          Assessment:     Doing well following endoscopic sinus surgery.  He also underwent septum/turbinate surgery which is unrelated to the reason for today's visit.    1. Other chronic sinusitis    2. Nasal polyposis    3. Moderate persistent asthma without complication         Plan:     Rx budesonide sinus rinse daily.  Follow up in about 1 month (around 5/25/2024) for nasal endoscopy.

## 2024-04-26 RX ORDER — BUDESONIDE 0.5 MG/2ML
INHALANT ORAL
Qty: 540 ML | Refills: 0 | Status: SHIPPED | OUTPATIENT
Start: 2024-04-26

## 2024-06-04 ENCOUNTER — OFFICE VISIT (OUTPATIENT)
Dept: OTOLARYNGOLOGY | Facility: CLINIC | Age: 69
End: 2024-06-04
Payer: MEDICARE

## 2024-06-04 VITALS
BODY MASS INDEX: 24.9 KG/M2 | WEIGHT: 188.69 LBS | DIASTOLIC BLOOD PRESSURE: 74 MMHG | HEART RATE: 82 BPM | SYSTOLIC BLOOD PRESSURE: 123 MMHG

## 2024-06-04 DIAGNOSIS — J32.8 OTHER CHRONIC SINUSITIS: Primary | ICD-10-CM

## 2024-06-04 DIAGNOSIS — J45.40 MODERATE PERSISTENT ASTHMA WITHOUT COMPLICATION: ICD-10-CM

## 2024-06-04 DIAGNOSIS — J33.9 NASAL POLYPOSIS: ICD-10-CM

## 2024-06-04 PROCEDURE — 99213 OFFICE O/P EST LOW 20 MIN: CPT | Mod: 25,S$GLB,, | Performed by: OTOLARYNGOLOGY

## 2024-06-04 PROCEDURE — 1126F AMNT PAIN NOTED NONE PRSNT: CPT | Mod: CPTII,S$GLB,, | Performed by: OTOLARYNGOLOGY

## 2024-06-04 PROCEDURE — 1101F PT FALLS ASSESS-DOCD LE1/YR: CPT | Mod: CPTII,S$GLB,, | Performed by: OTOLARYNGOLOGY

## 2024-06-04 PROCEDURE — 1159F MED LIST DOCD IN RCRD: CPT | Mod: CPTII,S$GLB,, | Performed by: OTOLARYNGOLOGY

## 2024-06-04 PROCEDURE — 31231 NASAL ENDOSCOPY DX: CPT | Mod: S$GLB,,, | Performed by: OTOLARYNGOLOGY

## 2024-06-04 PROCEDURE — 3288F FALL RISK ASSESSMENT DOCD: CPT | Mod: CPTII,S$GLB,, | Performed by: OTOLARYNGOLOGY

## 2024-06-04 PROCEDURE — 3008F BODY MASS INDEX DOCD: CPT | Mod: CPTII,S$GLB,, | Performed by: OTOLARYNGOLOGY

## 2024-06-04 PROCEDURE — 1160F RVW MEDS BY RX/DR IN RCRD: CPT | Mod: CPTII,S$GLB,, | Performed by: OTOLARYNGOLOGY

## 2024-06-04 PROCEDURE — 3074F SYST BP LT 130 MM HG: CPT | Mod: CPTII,S$GLB,, | Performed by: OTOLARYNGOLOGY

## 2024-06-04 PROCEDURE — 3078F DIAST BP <80 MM HG: CPT | Mod: CPTII,S$GLB,, | Performed by: OTOLARYNGOLOGY

## 2024-06-04 PROCEDURE — 99999 PR PBB SHADOW E&M-EST. PATIENT-LVL III: CPT | Mod: PBBFAC,,, | Performed by: OTOLARYNGOLOGY

## 2024-06-04 NOTE — PROGRESS NOTES
Subjective:      Saba Hansen is a 69 y.o. male who comes for follow-up 4-6 weeks status-post endoscopic sinus surgery.  His last visit with me was on 4/25/2024.  Doing well, breathing well, olfaction returned.  Using saline rinse with budesonide.        %           Objective:     /74 (BP Location: Right arm, Patient Position: Sitting, BP Method: Large (Automatic))   Pulse 82   Wt 85.6 kg (188 lb 11.4 oz)   BMI 24.90 kg/m²      General:   not in distress   Nasal:  edematous mucosa   no septal hematoma   no bleeding   Oral Cavity:   clear   Oropharynx:   no bleeding   Neck:   nontender       Procedure    Nasal endoscopy performed.  See procedure note.        Data Reviewed    Pathology report indicated chronic inflammation with eosinophilia.          Assessment:     Doing well following endoscopic sinus surgery.  He also underwent septum/turbinate surgery which is unrelated to the reason for today's visit.    1. Other chronic sinusitis    2. Nasal polyposis    3. Moderate persistent asthma without complication         Plan:     Doing well!  Reduce budesonide rinse to once daily.  Follow up in about 2 months (around 8/4/2024) for nasal endoscopy.

## 2024-06-04 NOTE — PROCEDURES
Nasal/sinus endoscopy    Date/Time: 6/4/2024 10:30 AM    Performed by: Rm Botello MD  Authorized by: Rm Botello MD    Anesthesia:     Local anesthetic:  Lidocaine 4% and Matthias-Synephrine 1/2%    Patient tolerance:  Patient tolerated the procedure well with no immediate complications  Nose:     Procedure Performed:  Nasal Endoscopy  External:      No external nasal deformity  Intranasal:      Mucosa no polyps     Mucosa ulcers not present     No mucosa lesions present     Turbinates not enlarged     No septum gross deformity  Nasopharynx:      No mucosa lesions     Adenoids not present     Posterior choanae patent     Eustachian tube not patent     Sinuses clear and patent bilaterally  No polyps

## 2024-06-28 DIAGNOSIS — J45.40 MODERATE PERSISTENT ASTHMA WITHOUT COMPLICATION: ICD-10-CM

## 2024-06-28 RX ORDER — BUDESONIDE 0.5 MG/2ML
INHALANT ORAL
Qty: 540 ML | Refills: 0 | Status: SHIPPED | OUTPATIENT
Start: 2024-06-28

## 2024-07-23 ENCOUNTER — TELEPHONE (OUTPATIENT)
Dept: PRIMARY CARE CLINIC | Facility: CLINIC | Age: 69
End: 2024-07-23
Payer: MEDICARE

## 2024-08-02 ENCOUNTER — OFFICE VISIT (OUTPATIENT)
Dept: PRIMARY CARE CLINIC | Facility: CLINIC | Age: 69
End: 2024-08-02
Payer: MEDICARE

## 2024-08-02 VITALS
WEIGHT: 188.19 LBS | SYSTOLIC BLOOD PRESSURE: 116 MMHG | HEART RATE: 79 BPM | OXYGEN SATURATION: 95 % | DIASTOLIC BLOOD PRESSURE: 78 MMHG | HEIGHT: 73 IN | BODY MASS INDEX: 24.94 KG/M2

## 2024-08-02 DIAGNOSIS — R73.03 PREDIABETES: Primary | ICD-10-CM

## 2024-08-02 DIAGNOSIS — J45.40 MODERATE PERSISTENT ASTHMA WITHOUT COMPLICATION: ICD-10-CM

## 2024-08-02 DIAGNOSIS — N40.1 BPH WITH URINARY OBSTRUCTION: ICD-10-CM

## 2024-08-02 DIAGNOSIS — J30.89 ALLERGIC RHINITIS DUE TO OTHER ALLERGIC TRIGGER, UNSPECIFIED SEASONALITY: ICD-10-CM

## 2024-08-02 DIAGNOSIS — M25.512 ACUTE PAIN OF LEFT SHOULDER: ICD-10-CM

## 2024-08-02 DIAGNOSIS — N13.8 BPH WITH URINARY OBSTRUCTION: ICD-10-CM

## 2024-08-02 DIAGNOSIS — Z12.11 COLON CANCER SCREENING: ICD-10-CM

## 2024-08-02 DIAGNOSIS — E78.5 HYPERLIPIDEMIA, UNSPECIFIED HYPERLIPIDEMIA TYPE: ICD-10-CM

## 2024-08-02 PROCEDURE — 99999 PR PBB SHADOW E&M-EST. PATIENT-LVL IV: CPT | Mod: PBBFAC,,, | Performed by: FAMILY MEDICINE

## 2024-08-02 RX ORDER — TAMSULOSIN HYDROCHLORIDE 0.4 MG/1
0.4 CAPSULE ORAL DAILY
Qty: 90 CAPSULE | Refills: 3 | Status: SHIPPED | OUTPATIENT
Start: 2024-08-02

## 2024-08-02 RX ORDER — ATORVASTATIN CALCIUM 40 MG/1
40 TABLET, FILM COATED ORAL DAILY
Qty: 90 TABLET | Refills: 3 | Status: SHIPPED | OUTPATIENT
Start: 2024-08-02

## 2024-08-02 RX ORDER — FLUTICASONE PROPIONATE AND SALMETEROL XINAFOATE 230; 21 UG/1; UG/1
2 AEROSOL, METERED RESPIRATORY (INHALATION) 2 TIMES DAILY
Qty: 36 G | Refills: 3 | Status: SHIPPED | OUTPATIENT
Start: 2024-08-02

## 2024-08-02 RX ORDER — ALBUTEROL SULFATE 90 UG/1
2 AEROSOL, METERED RESPIRATORY (INHALATION) EVERY 6 HOURS PRN
Qty: 18 G | Refills: 2 | Status: SHIPPED | OUTPATIENT
Start: 2024-08-02

## 2024-08-02 RX ORDER — FINASTERIDE 5 MG/1
5 TABLET, FILM COATED ORAL DAILY
Qty: 90 TABLET | Refills: 3 | Status: SHIPPED | OUTPATIENT
Start: 2024-08-02

## 2024-08-02 RX ORDER — MONTELUKAST SODIUM 10 MG/1
10 TABLET ORAL NIGHTLY
Qty: 90 TABLET | Refills: 3 | Status: SHIPPED | OUTPATIENT
Start: 2024-08-02

## 2024-08-02 RX ORDER — CETIRIZINE HYDROCHLORIDE 10 MG/1
10 TABLET ORAL DAILY
Qty: 90 TABLET | Refills: 3 | Status: SHIPPED | OUTPATIENT
Start: 2024-08-02 | End: 2025-08-02

## 2024-08-02 RX ORDER — IBUPROFEN 800 MG/1
800 TABLET ORAL EVERY 6 HOURS PRN
Qty: 30 TABLET | Refills: 2 | Status: SHIPPED | OUTPATIENT
Start: 2024-08-02

## 2024-08-02 NOTE — PROGRESS NOTES
Clinic Note  8/2/2024      Subjective:       Patient ID:  Saba is a 69 y.o. male being seen for an established visit.    Chief Complaint: Follow-up    Annual exam-patient with cataracts, nasal polyps, moderate persistent asthma, BPH here for annual exam and medication refills.    Asthma-requesting refills of Advair, albuterol, cetirizine, Singulair.  Doing well on this regimen     Hyperlipidemia-requesting refills of atorvastatin     BPH-stable on Flomax      Review of Systems   Constitutional:  Negative for chills, fever, malaise/fatigue and weight loss.   HENT:  Negative for congestion, sinus pain and sore throat.    Respiratory:  Negative for cough, shortness of breath and wheezing.    Cardiovascular:  Negative for chest pain and palpitations.   Gastrointestinal:  Negative for constipation, diarrhea, nausea and vomiting.   Genitourinary:  Negative for dysuria, frequency and urgency.   Musculoskeletal:  Negative for myalgias.   Skin:  Negative for rash.   Neurological:  Negative for headaches.       Medication List with Changes/Refills   Current Medications    ACETAMINOPHEN (TYLENOL) 325 MG TABLET    Take 2 tablets (650 mg total) by mouth every 6 (six) hours as needed for Pain.    ALCOHOL SWABS (DROPSAFE ALCOHOL PREP PADS) PADM    Apply 1 each topically once daily. As directed    AZELASTINE (ASTELIN) 137 MCG (0.1 %) NASAL SPRAY    2 sprays (274 mcg total) by Nasal route 2 (two) times daily.    BUDESONIDE (PULMICORT) 0.5 MG/2 ML NEBULIZER SOLUTION    TAKE 2 MLS BY MOUTH ONCE DAILY. FOR USE IN SALINE IRRGATION AS DIRECTED    FLUTICASONE PROPIONATE (FLONASE) 50 MCG/ACTUATION NASAL SPRAY    2 sprays (100 mcg total) by Each Nostril route 2 (two) times a day.    LANCETS (TRUEPLUS LANCETS) 33 GAUGE MISC    1 lancet by Misc.(Non-Drug; Combo Route) route once daily.    PAPAVERINE 30 MG/ML INJECTION    Add: Phentolamine 1 mg  Add: PGE1 10 mcg    Sig:  Inject 30 units as directed; titrate up by 5 units until desired  results   Changed and/or Refilled Medications    Modified Medication Previous Medication    ALBUTEROL (PROAIR HFA) 90 MCG/ACTUATION INHALER albuterol (PROAIR HFA) 90 mcg/actuation inhaler       Inhale 2 puffs into the lungs every 6 (six) hours as needed for Wheezing. Rescue    Inhale 2 puffs into the lungs every 6 (six) hours as needed for Wheezing. Rescue    ATORVASTATIN (LIPITOR) 40 MG TABLET atorvastatin (LIPITOR) 40 MG tablet       Take 1 tablet (40 mg total) by mouth once daily. High cholesterol    TAKE 1 TABLET(40 MG) BY MOUTH EVERY DAY    CETIRIZINE (ZYRTEC) 10 MG TABLET cetirizine (ZYRTEC) 10 MG tablet       Take 1 tablet (10 mg total) by mouth once daily.    Take 1 tablet (10 mg total) by mouth once daily.    FINASTERIDE (PROSCAR) 5 MG TABLET finasteride (PROSCAR) 5 mg tablet       Take 1 tablet (5 mg total) by mouth once daily. prostate    Take 1 tablet (5 mg total) by mouth once daily.    FLUTICASONE-SALMETEROL 230-21 MCG/DOSE (ADVAIR HFA) 230-21 MCG/ACTUATION HFAA INHALER ADVAIR -21 mcg/actuation HFAA inhaler       Inhale 2 puffs into the lungs 2 (two) times daily.    INHALE 2 PUFFS BY MOUTH TWICE DAILY    IBUPROFEN (ADVIL,MOTRIN) 800 MG TABLET ibuprofen (ADVIL,MOTRIN) 800 MG tablet       Take 1 tablet (800 mg total) by mouth every 6 (six) hours as needed for Pain (L shoulder pain (take with food)).    Take 1 tablet (800 mg total) by mouth every 6 (six) hours as needed for Pain (L shoulder pain).    MONTELUKAST (SINGULAIR) 10 MG TABLET montelukast (SINGULAIR) 10 mg tablet       Take 1 tablet (10 mg total) by mouth every evening. Asthma / allergies    Take 1 tablet (10 mg total) by mouth every evening.    TAMSULOSIN (FLOMAX) 0.4 MG CAP tamsulosin (FLOMAX) 0.4 mg Cap       Take 1 capsule (0.4 mg total) by mouth once daily. prostate    Take 1 capsule (0.4 mg total) by mouth once daily. prostate   Discontinued Medications    AMOXICILLIN (AMOXIL) 500 MG TAB    TAKE 1 TABLET(500 MG) BY MOUTH EVERY 12  "HOURS FOR 10 DAYS    ONDANSETRON (ZOFRAN) 4 MG TABLET    Take 1 tablet (4 mg total) by mouth every 8 (eight) hours as needed for Nausea.    PREDNISONE (DELTASONE) 10 MG TABLET    TAKE 2 PILLS BY MOUTH DAILY FOR 7 DAYS THEN 1/2 PILL BY MOUTH FOR 4 DAYS       Patient Active Problem List   Diagnosis    Tonsillar mass    Chronic sinusitis    Chronic rhinitis    Moderate persistent asthma without complication    Nasal polyps    Allergic rhinitis    BPH with urinary obstruction    Erectile dysfunction due to arterial insufficiency    Other hyperlipidemia    Elevated prostate specific antigen (PSA)    Nuclear sclerotic cataract of right eye           Objective:      /78 (BP Location: Left arm, Patient Position: Sitting, BP Method: Large (Automatic))   Pulse 79   Ht 6' 1" (1.854 m)   Wt 85.3 kg (188 lb 2.6 oz)   SpO2 95%   BMI 24.83 kg/m²   Estimated body mass index is 24.83 kg/m² as calculated from the following:    Height as of this encounter: 6' 1" (1.854 m).    Weight as of this encounter: 85.3 kg (188 lb 2.6 oz).  Physical Exam  Vitals reviewed.   Constitutional:       General: He is not in acute distress.     Appearance: He is not diaphoretic.   HENT:      Head: Normocephalic and atraumatic.   Eyes:      Conjunctiva/sclera: Conjunctivae normal.   Cardiovascular:      Rate and Rhythm: Normal rate and regular rhythm.      Heart sounds: Normal heart sounds.   Pulmonary:      Effort: Pulmonary effort is normal. No respiratory distress.      Breath sounds: Normal breath sounds. No wheezing.   Abdominal:      General: Bowel sounds are normal.      Palpations: Abdomen is soft.   Musculoskeletal:         General: Normal range of motion.      Cervical back: Normal range of motion.   Skin:     General: Skin is warm and dry.      Findings: No erythema or rash.   Neurological:      Mental Status: He is alert and oriented to person, place, and time.   Psychiatric:         Mood and Affect: Mood and affect normal.         " Behavior: Behavior normal.         Thought Content: Thought content normal.         Judgment: Judgment normal.           Assessment and Plan:     1. Moderate persistent asthma without complication  - fluticasone-salmeterol 230-21 mcg/dose (ADVAIR HFA) 230-21 mcg/actuation HFAA inhaler; Inhale 2 puffs into the lungs 2 (two) times daily.  Dispense: 36 g; Refill: 3  - albuterol (PROAIR HFA) 90 mcg/actuation inhaler; Inhale 2 puffs into the lungs every 6 (six) hours as needed for Wheezing. Rescue  Dispense: 18 g; Refill: 2  - Comprehensive Metabolic Panel; Future  - CBC Auto Differential; Future    2. Hyperlipidemia, unspecified hyperlipidemia type  - atorvastatin (LIPITOR) 40 MG tablet; Take 1 tablet (40 mg total) by mouth once daily. High cholesterol  Dispense: 90 tablet; Refill: 3  - Comprehensive Metabolic Panel; Future  - CBC Auto Differential; Future  - Lipid Panel; Future    3. Allergic rhinitis due to other allergic trigger, unspecified seasonality  - cetirizine (ZYRTEC) 10 MG tablet; Take 1 tablet (10 mg total) by mouth once daily.  Dispense: 90 tablet; Refill: 3  - montelukast (SINGULAIR) 10 mg tablet; Take 1 tablet (10 mg total) by mouth every evening. Asthma / allergies  Dispense: 90 tablet; Refill: 3  - Comprehensive Metabolic Panel; Future  - CBC Auto Differential; Future    4. Acute pain of left shoulder  - ibuprofen (ADVIL,MOTRIN) 800 MG tablet; Take 1 tablet (800 mg total) by mouth every 6 (six) hours as needed for Pain (L shoulder pain (take with food)).  Dispense: 30 tablet; Refill: 2  - Comprehensive Metabolic Panel; Future  - CBC Auto Differential; Future    5. BPH with urinary obstruction  - tamsulosin (FLOMAX) 0.4 mg Cap; Take 1 capsule (0.4 mg total) by mouth once daily. prostate  Dispense: 90 capsule; Refill: 3  - finasteride (PROSCAR) 5 mg tablet; Take 1 tablet (5 mg total) by mouth once daily. prostate  Dispense: 90 tablet; Refill: 3  - Comprehensive Metabolic Panel; Future  - CBC Auto  Differential; Future    6. Prediabetes  - Comprehensive Metabolic Panel; Future  - CBC Auto Differential; Future  - Hemoglobin A1C; Future    7. Colon cancer screening  - Cologuard Screening (Multitarget Stool DNA); Future  - Cologuard Screening (Multitarget Stool DNA)      Follow Up:   Follow up in about 1 year (around 8/2/2025) for follow-up.    Other Orders Placed This Visit:  Orders Placed This Encounter   Procedures    Cologuard Screening (Multitarget Stool DNA)    Comprehensive Metabolic Panel    CBC Auto Differential    Lipid Panel    Hemoglobin A1C         Drew Avendano MD        This note is dictated on [employer] word recognition program.  There are word recognition mistakes that are occasionally missed on review.

## 2024-08-15 ENCOUNTER — OFFICE VISIT (OUTPATIENT)
Dept: OTOLARYNGOLOGY | Facility: CLINIC | Age: 69
End: 2024-08-15
Payer: MEDICARE

## 2024-08-15 VITALS
SYSTOLIC BLOOD PRESSURE: 108 MMHG | DIASTOLIC BLOOD PRESSURE: 70 MMHG | WEIGHT: 186.75 LBS | HEART RATE: 73 BPM | BODY MASS INDEX: 24.64 KG/M2

## 2024-08-15 DIAGNOSIS — J45.40 MODERATE PERSISTENT ASTHMA WITHOUT COMPLICATION: ICD-10-CM

## 2024-08-15 DIAGNOSIS — J31.0 CHRONIC RHINITIS: Primary | ICD-10-CM

## 2024-08-15 DIAGNOSIS — J33.9 NASAL POLYPOSIS: ICD-10-CM

## 2024-08-15 DIAGNOSIS — J32.8 OTHER CHRONIC SINUSITIS: ICD-10-CM

## 2024-08-15 PROCEDURE — 87070 CULTURE OTHR SPECIMN AEROBIC: CPT | Performed by: OTOLARYNGOLOGY

## 2024-08-15 PROCEDURE — 3074F SYST BP LT 130 MM HG: CPT | Mod: CPTII,S$GLB,, | Performed by: OTOLARYNGOLOGY

## 2024-08-15 PROCEDURE — 87075 CULTR BACTERIA EXCEPT BLOOD: CPT | Performed by: OTOLARYNGOLOGY

## 2024-08-15 PROCEDURE — 1159F MED LIST DOCD IN RCRD: CPT | Mod: CPTII,S$GLB,, | Performed by: OTOLARYNGOLOGY

## 2024-08-15 PROCEDURE — 87186 SC STD MICRODIL/AGAR DIL: CPT | Performed by: OTOLARYNGOLOGY

## 2024-08-15 PROCEDURE — 1101F PT FALLS ASSESS-DOCD LE1/YR: CPT | Mod: CPTII,S$GLB,, | Performed by: OTOLARYNGOLOGY

## 2024-08-15 PROCEDURE — 3008F BODY MASS INDEX DOCD: CPT | Mod: CPTII,S$GLB,, | Performed by: OTOLARYNGOLOGY

## 2024-08-15 PROCEDURE — 3288F FALL RISK ASSESSMENT DOCD: CPT | Mod: CPTII,S$GLB,, | Performed by: OTOLARYNGOLOGY

## 2024-08-15 PROCEDURE — 1126F AMNT PAIN NOTED NONE PRSNT: CPT | Mod: CPTII,S$GLB,, | Performed by: OTOLARYNGOLOGY

## 2024-08-15 PROCEDURE — 1160F RVW MEDS BY RX/DR IN RCRD: CPT | Mod: CPTII,S$GLB,, | Performed by: OTOLARYNGOLOGY

## 2024-08-15 PROCEDURE — 3078F DIAST BP <80 MM HG: CPT | Mod: CPTII,S$GLB,, | Performed by: OTOLARYNGOLOGY

## 2024-08-15 PROCEDURE — 31231 NASAL ENDOSCOPY DX: CPT | Mod: S$GLB,,, | Performed by: OTOLARYNGOLOGY

## 2024-08-15 PROCEDURE — 99999 PR PBB SHADOW E&M-EST. PATIENT-LVL III: CPT | Mod: PBBFAC,,, | Performed by: OTOLARYNGOLOGY

## 2024-08-15 PROCEDURE — 87102 FUNGUS ISOLATION CULTURE: CPT | Performed by: OTOLARYNGOLOGY

## 2024-08-15 PROCEDURE — 99214 OFFICE O/P EST MOD 30 MIN: CPT | Mod: 25,S$GLB,, | Performed by: OTOLARYNGOLOGY

## 2024-08-15 RX ORDER — PREDNISONE 20 MG/1
20 TABLET ORAL DAILY
Qty: 5 TABLET | Refills: 0 | Status: SHIPPED | OUTPATIENT
Start: 2024-08-15 | End: 2024-08-20

## 2024-08-15 NOTE — PROCEDURES
Nasal/sinus endoscopy    Date/Time: 8/15/2024 10:30 AM    Performed by: Rm Botello MD  Authorized by: Rm Botello MD    Anesthesia:     Local anesthetic:  Lidocaine 4% and Matthias-Synephrine 1/2%    Patient tolerance:  Patient tolerated the procedure well with no immediate complications  Nose:     Procedure Performed:  Nasal Endoscopy  External:      No external nasal deformity  Intranasal:      Mucosa no polyps     Mucosa ulcers not present     No mucosa lesions present     Turbinates not enlarged     No septum gross deformity  Nasopharynx:      No mucosa lesions     Adenoids not present     Posterior choanae patent     Eustachian tube not patent     Diffuse edema and mucus bilaterally  Focal mucopurulence in left middle meatus, swabbed for culture

## 2024-08-15 NOTE — PROGRESS NOTES
Subjective:      Saba is a 69 y.o. male who comes for follow-up of sinusitis. His last visit with me was on 6/4/2024. Now 4 months status-post revision endoscopic sinus surgery.  Doing fine, notes waxing and waning hyposmia, congestion, mucus.  Using budesonide rinse daily.        %       The patient's medications, allergies, past medical, surgical, social and family histories were reviewed and updated as appropriate.    A detailed review of systems was obtained with pertinent positives as per the above HPI, and otherwise negative.        Objective:     /70 (BP Location: Right arm, Patient Position: Sitting, BP Method: Large (Automatic))   Pulse 73   Wt 84.7 kg (186 lb 11.7 oz)   BMI 24.64 kg/m²        Constitutional:   He appears well-developed. He is cooperative.     Head:  Normocephalic.     Nose:  No mucosal edema, rhinorrhea, septal deviation or polyps. No epistaxis. Turbinates normal, no turbinate masses and no turbinate hypertrophy.  Right sinus exhibits no maxillary sinus tenderness and no frontal sinus tenderness. Left sinus exhibits no maxillary sinus tenderness and no frontal sinus tenderness.     Mouth/Throat  Oropharynx clear and moist without lesions or asymmetry. No oropharyngeal exudate or posterior oropharyngeal erythema.     Neck:  No adenopathy. Normal range of motion present.     He has no cervical adenopathy.       Procedure    Nasal endoscopy performed.  See procedure note.        Data Reviewed    WBC (K/uL)   Date Value   10/24/2023 7.07     Eosinophil % (%)   Date Value   10/24/2023 15.1 (H)     Eos # (K/uL)   Date Value   10/24/2023 1.1 (H)     Platelets (K/uL)   Date Value   10/24/2023 193     Glucose (mg/dL)   Date Value   10/24/2023 81     Total IgE (IU/mL)   Date Value   04/27/2018 102 (H)       Pathology report indicated chronic inflammation with eosinophilia.          Assessment:     1. Chronic rhinitis    2. Other chronic sinusitis    3. Nasal polyposis    4. Moderate  persistent asthma without complication         Plan:     Rx prednisone burst.  Cultures taken, will call in antibiotics.  Follow up in about 3 months (around 11/15/2024) for nasal endoscopy.

## 2024-08-17 LAB — BACTERIA SPEC AEROBE CULT: ABNORMAL

## 2024-08-19 ENCOUNTER — PATIENT MESSAGE (OUTPATIENT)
Dept: OTOLARYNGOLOGY | Facility: CLINIC | Age: 69
End: 2024-08-19
Payer: MEDICARE

## 2024-08-19 DIAGNOSIS — J32.8 OTHER CHRONIC SINUSITIS: Primary | ICD-10-CM

## 2024-08-19 RX ORDER — CIPROFLOXACIN 500 MG/1
500 TABLET ORAL 2 TIMES DAILY
Qty: 20 TABLET | Refills: 0 | Status: SHIPPED | OUTPATIENT
Start: 2024-08-19 | End: 2024-08-29

## 2024-09-25 DIAGNOSIS — R73.03 PREDIABETES: ICD-10-CM

## 2024-09-25 RX ORDER — ISOPROPYL ALCOHOL 70 ML/100ML
1 SWAB TOPICAL DAILY
Qty: 100 EACH | Refills: 3 | Status: SHIPPED | OUTPATIENT
Start: 2024-09-25

## 2024-09-25 NOTE — TELEPHONE ENCOUNTER
Refill Decision Note   Juniorcarolin Hansen  is requesting a refill authorization.  Brief Assessment and Rationale for Refill:  Approve     Medication Therapy Plan:  FLOS      Comments:     Note composed:7:41 AM 09/25/2024

## 2024-09-25 NOTE — TELEPHONE ENCOUNTER
Care Due:                  Date            Visit Type   Department     Provider  --------------------------------------------------------------------------------                                EP -                              PRIMARY      [facility] PRIMARY  Last Visit: 08-      CARE ([facility])   VAUGHN MILLIGAN  Next Visit: None Scheduled  None         None Found                                                            Last  Test          Frequency    Reason                     Performed    Due Date  --------------------------------------------------------------------------------    CBC.........  12 months..  ibuprofen................  10-   10-    CMP.........  12 months..  atorvastatin, ibuprofen..  10-   10-    Lipid Panel.  12 months..  atorvastatin.............  10-   10-    [employer] Embedded Care Due Messages. Reference number: [id number].   9/25/2024 1:33:11 AM CDT

## 2024-10-02 ENCOUNTER — LAB VISIT (OUTPATIENT)
Dept: LAB | Facility: HOSPITAL | Age: 69
End: 2024-10-02
Payer: MEDICARE

## 2024-10-02 DIAGNOSIS — M25.512 ACUTE PAIN OF LEFT SHOULDER: ICD-10-CM

## 2024-10-02 DIAGNOSIS — N40.1 BPH WITH URINARY OBSTRUCTION: ICD-10-CM

## 2024-10-02 DIAGNOSIS — E78.5 HYPERLIPIDEMIA, UNSPECIFIED HYPERLIPIDEMIA TYPE: ICD-10-CM

## 2024-10-02 DIAGNOSIS — J45.40 MODERATE PERSISTENT ASTHMA WITHOUT COMPLICATION: ICD-10-CM

## 2024-10-02 DIAGNOSIS — R73.03 PREDIABETES: ICD-10-CM

## 2024-10-02 DIAGNOSIS — N13.8 BPH WITH URINARY OBSTRUCTION: ICD-10-CM

## 2024-10-02 DIAGNOSIS — J30.89 ALLERGIC RHINITIS DUE TO OTHER ALLERGIC TRIGGER, UNSPECIFIED SEASONALITY: ICD-10-CM

## 2024-10-02 LAB
ALBUMIN SERPL BCP-MCNC: 3.6 G/DL (ref 3.5–5.2)
ALP SERPL-CCNC: 96 U/L (ref 55–135)
ALT SERPL W/O P-5'-P-CCNC: 30 U/L (ref 10–44)
ANION GAP SERPL CALC-SCNC: 5 MMOL/L (ref 8–16)
AST SERPL-CCNC: 24 U/L (ref 10–40)
BASOPHILS # BLD AUTO: 0.06 K/UL (ref 0–0.2)
BASOPHILS NFR BLD: 0.7 % (ref 0–1.9)
BILIRUB SERPL-MCNC: 0.7 MG/DL (ref 0.1–1)
BUN SERPL-MCNC: 12 MG/DL (ref 8–23)
CALCIUM SERPL-MCNC: 9.5 MG/DL (ref 8.7–10.5)
CHLORIDE SERPL-SCNC: 109 MMOL/L (ref 95–110)
CHOLEST SERPL-MCNC: 166 MG/DL (ref 120–199)
CHOLEST/HDLC SERPL: 2.3 {RATIO} (ref 2–5)
CO2 SERPL-SCNC: 29 MMOL/L (ref 23–29)
CREAT SERPL-MCNC: 0.8 MG/DL (ref 0.5–1.4)
DIFFERENTIAL METHOD BLD: ABNORMAL
EOSINOPHIL # BLD AUTO: 1 K/UL (ref 0–0.5)
EOSINOPHIL NFR BLD: 11.8 % (ref 0–8)
ERYTHROCYTE [DISTWIDTH] IN BLOOD BY AUTOMATED COUNT: 14 % (ref 11.5–14.5)
EST. GFR  (NO RACE VARIABLE): >60 ML/MIN/1.73 M^2
ESTIMATED AVG GLUCOSE: 114 MG/DL (ref 68–131)
GLUCOSE SERPL-MCNC: 103 MG/DL (ref 70–110)
HBA1C MFR BLD: 5.6 % (ref 4–5.6)
HCT VFR BLD AUTO: 41.6 % (ref 40–54)
HDLC SERPL-MCNC: 72 MG/DL (ref 40–75)
HDLC SERPL: 43.4 % (ref 20–50)
HGB BLD-MCNC: 13.6 G/DL (ref 14–18)
IMM GRANULOCYTES # BLD AUTO: 0.01 K/UL (ref 0–0.04)
IMM GRANULOCYTES NFR BLD AUTO: 0.1 % (ref 0–0.5)
LDLC SERPL CALC-MCNC: 80.6 MG/DL (ref 63–159)
LYMPHOCYTES # BLD AUTO: 2.9 K/UL (ref 1–4.8)
LYMPHOCYTES NFR BLD: 35.9 % (ref 18–48)
MCH RBC QN AUTO: 28.5 PG (ref 27–31)
MCHC RBC AUTO-ENTMCNC: 32.7 G/DL (ref 32–36)
MCV RBC AUTO: 87 FL (ref 82–98)
MONOCYTES # BLD AUTO: 0.5 K/UL (ref 0.3–1)
MONOCYTES NFR BLD: 6.4 % (ref 4–15)
NEUTROPHILS # BLD AUTO: 3.7 K/UL (ref 1.8–7.7)
NEUTROPHILS NFR BLD: 45.1 % (ref 38–73)
NONHDLC SERPL-MCNC: 94 MG/DL
NRBC BLD-RTO: 0 /100 WBC
PLATELET # BLD AUTO: 182 K/UL (ref 150–450)
PMV BLD AUTO: 10 FL (ref 9.2–12.9)
POTASSIUM SERPL-SCNC: 4 MMOL/L (ref 3.5–5.1)
PROT SERPL-MCNC: 7.1 G/DL (ref 6–8.4)
RBC # BLD AUTO: 4.78 M/UL (ref 4.6–6.2)
SODIUM SERPL-SCNC: 143 MMOL/L (ref 136–145)
TRIGL SERPL-MCNC: 67 MG/DL (ref 30–150)
WBC # BLD AUTO: 8.11 K/UL (ref 3.9–12.7)

## 2024-10-02 PROCEDURE — 36415 COLL VENOUS BLD VENIPUNCTURE: CPT | Performed by: FAMILY MEDICINE

## 2024-10-02 PROCEDURE — 83036 HEMOGLOBIN GLYCOSYLATED A1C: CPT | Performed by: FAMILY MEDICINE

## 2024-10-02 PROCEDURE — 80053 COMPREHEN METABOLIC PANEL: CPT | Performed by: FAMILY MEDICINE

## 2024-10-02 PROCEDURE — 85025 COMPLETE CBC W/AUTO DIFF WBC: CPT | Performed by: FAMILY MEDICINE

## 2024-10-02 PROCEDURE — 80061 LIPID PANEL: CPT | Performed by: FAMILY MEDICINE

## 2024-10-02 RX ORDER — BUDESONIDE 0.5 MG/2ML
INHALANT ORAL
Qty: 540 ML | Refills: 0 | Status: SHIPPED | OUTPATIENT
Start: 2024-10-02

## 2024-11-05 DIAGNOSIS — J45.40 MODERATE PERSISTENT ASTHMA WITHOUT COMPLICATION: ICD-10-CM

## 2024-11-05 RX ORDER — ALBUTEROL SULFATE 90 UG/1
2 INHALANT RESPIRATORY (INHALATION) EVERY 6 HOURS PRN
Qty: 25.5 G | Refills: 2 | Status: SHIPPED | OUTPATIENT
Start: 2024-11-05

## 2024-11-05 NOTE — TELEPHONE ENCOUNTER
No care due was identified.  [facility] Embedded Care Due Messages. Reference number: [id number].   11/05/2024 10:09:49 AM CST

## 2024-11-05 NOTE — TELEPHONE ENCOUNTER
Refill Decision Note   Saba Hansen  is requesting a refill authorization.  Brief Assessment and Rationale for Refill:  Approve     Medication Therapy Plan:         Comments:     Note composed:1:59 PM 11/05/2024

## 2024-11-14 ENCOUNTER — OFFICE VISIT (OUTPATIENT)
Dept: OTOLARYNGOLOGY | Facility: CLINIC | Age: 69
End: 2024-11-14
Payer: MEDICARE

## 2024-11-14 VITALS
BODY MASS INDEX: 25.19 KG/M2 | DIASTOLIC BLOOD PRESSURE: 76 MMHG | SYSTOLIC BLOOD PRESSURE: 115 MMHG | HEART RATE: 88 BPM | WEIGHT: 190.94 LBS

## 2024-11-14 DIAGNOSIS — J33.9 NASAL POLYPOSIS: ICD-10-CM

## 2024-11-14 DIAGNOSIS — J32.8 OTHER CHRONIC SINUSITIS: ICD-10-CM

## 2024-11-14 DIAGNOSIS — J45.40 MODERATE PERSISTENT ASTHMA WITHOUT COMPLICATION: ICD-10-CM

## 2024-11-14 DIAGNOSIS — J31.0 CHRONIC RHINITIS: Primary | ICD-10-CM

## 2024-11-14 PROCEDURE — 1126F AMNT PAIN NOTED NONE PRSNT: CPT | Mod: CPTII,S$GLB,, | Performed by: OTOLARYNGOLOGY

## 2024-11-14 PROCEDURE — 3078F DIAST BP <80 MM HG: CPT | Mod: CPTII,S$GLB,, | Performed by: OTOLARYNGOLOGY

## 2024-11-14 PROCEDURE — 99213 OFFICE O/P EST LOW 20 MIN: CPT | Mod: 25,S$GLB,, | Performed by: OTOLARYNGOLOGY

## 2024-11-14 PROCEDURE — 3008F BODY MASS INDEX DOCD: CPT | Mod: CPTII,S$GLB,, | Performed by: OTOLARYNGOLOGY

## 2024-11-14 PROCEDURE — 3044F HG A1C LEVEL LT 7.0%: CPT | Mod: CPTII,S$GLB,, | Performed by: OTOLARYNGOLOGY

## 2024-11-14 PROCEDURE — 31231 NASAL ENDOSCOPY DX: CPT | Mod: S$GLB,,, | Performed by: OTOLARYNGOLOGY

## 2024-11-14 PROCEDURE — 1160F RVW MEDS BY RX/DR IN RCRD: CPT | Mod: CPTII,S$GLB,, | Performed by: OTOLARYNGOLOGY

## 2024-11-14 PROCEDURE — 3074F SYST BP LT 130 MM HG: CPT | Mod: CPTII,S$GLB,, | Performed by: OTOLARYNGOLOGY

## 2024-11-14 PROCEDURE — 99999 PR PBB SHADOW E&M-EST. PATIENT-LVL IV: CPT | Mod: PBBFAC,,, | Performed by: OTOLARYNGOLOGY

## 2024-11-14 PROCEDURE — 1101F PT FALLS ASSESS-DOCD LE1/YR: CPT | Mod: CPTII,S$GLB,, | Performed by: OTOLARYNGOLOGY

## 2024-11-14 PROCEDURE — 1159F MED LIST DOCD IN RCRD: CPT | Mod: CPTII,S$GLB,, | Performed by: OTOLARYNGOLOGY

## 2024-11-14 PROCEDURE — 3288F FALL RISK ASSESSMENT DOCD: CPT | Mod: CPTII,S$GLB,, | Performed by: OTOLARYNGOLOGY

## 2024-11-14 NOTE — PROCEDURES
Nasal/sinus endoscopy    Date/Time: 11/14/2024 8:45 AM    Performed by: Rm Botello MD  Authorized by: Rm Botello MD    Anesthesia:     Local anesthetic:  Lidocaine 4% and Matthias-Synephrine 1/2%    Patient tolerance:  Patient tolerated the procedure well with no immediate complications  Nose:     Procedure Performed:  Nasal Endoscopy  External:      No external nasal deformity  Intranasal:      Mucosa polyps     Mucosa ulcers not present     No mucosa lesions present     Enlarged turbinates     No septum gross deformity  Nasopharynx:      No mucosa lesions     Adenoids not present     Posterior choanae patent     Eustachian tube not patent     Grade 3 polyps on left  Grade 1 polyp on right  Scattered mucoid exudate, greater on left

## 2024-11-14 NOTE — PROGRESS NOTES
Subjective:      Saba is a 69 y.o. male who comes for follow-up of sinusitis. His last visit with me was on 8/15/2024. Now 7 months status-post endoscopic revision sinus surgery.  Finished ciprofloxacin from last visit.  Doing fine, breathing well through nose, however remains anosmic with nasal discharge and PND.  Using budesonide rinse daily.        %       The patient's medications, allergies, past medical, surgical, social and family histories were reviewed and updated as appropriate.    A detailed review of systems was obtained with pertinent positives as per the above HPI, and otherwise negative.        Objective:     /76 (BP Location: Right arm, Patient Position: Sitting)   Pulse 88   Wt 86.6 kg (190 lb 14.7 oz)   BMI 25.19 kg/m²        Constitutional:   He appears well-developed. He is cooperative.     Head:  Normocephalic.     Nose:  Mucosal edema present. No rhinorrhea, septal deviation or polyps. No epistaxis. Turbinate hypertrophy.  Turbinates normal and no turbinate masses.  Right sinus exhibits no maxillary sinus tenderness and no frontal sinus tenderness. Left sinus exhibits no maxillary sinus tenderness and no frontal sinus tenderness.     Mouth/Throat  Oropharynx clear and moist without lesions or asymmetry. No oropharyngeal exudate or posterior oropharyngeal erythema.     Neck:  No adenopathy. Normal range of motion present.     He has no cervical adenopathy.       Procedure    Nasal endoscopy performed.  See procedure note.        Data Reviewed    WBC (K/uL)   Date Value   10/02/2024 8.11     Eosinophil % (%)   Date Value   10/02/2024 11.8 (H)     Eos # (K/uL)   Date Value   10/02/2024 1.0 (H)     Platelets (K/uL)   Date Value   10/02/2024 182     Glucose (mg/dL)   Date Value   10/02/2024 103     Total IgE (IU/mL)   Date Value   04/27/2018 102 (H)       Pathology report indicated chronic inflammation with eosinophilia.  Cultures showed E coli at last visit.      Assessment:     1.  Chronic rhinitis    2. Other chronic sinusitis    3. Nasal polyposis    4. Moderate persistent asthma without complication         Plan:     May benefit from Dupixent, will refer to allergy clinic.  Continue budesonide rinse.  No follow-ups on file.

## 2025-01-15 ENCOUNTER — OFFICE VISIT (OUTPATIENT)
Dept: ALLERGY | Facility: CLINIC | Age: 70
End: 2025-01-15
Payer: MEDICARE

## 2025-01-15 VITALS
SYSTOLIC BLOOD PRESSURE: 149 MMHG | DIASTOLIC BLOOD PRESSURE: 89 MMHG | OXYGEN SATURATION: 96 % | BODY MASS INDEX: 25.01 KG/M2 | HEIGHT: 73 IN | WEIGHT: 188.69 LBS | HEART RATE: 68 BPM

## 2025-01-15 DIAGNOSIS — J31.0 CHRONIC RHINITIS: ICD-10-CM

## 2025-01-15 DIAGNOSIS — J33.9 NASAL POLYPOSIS: Primary | ICD-10-CM

## 2025-01-15 DIAGNOSIS — D72.10 EOSINOPHILIA, UNSPECIFIED TYPE: ICD-10-CM

## 2025-01-15 DIAGNOSIS — J45.40 MODERATE PERSISTENT ASTHMA, UNSPECIFIED WHETHER COMPLICATED: ICD-10-CM

## 2025-01-15 PROCEDURE — 3077F SYST BP >= 140 MM HG: CPT | Mod: CPTII,S$GLB,, | Performed by: STUDENT IN AN ORGANIZED HEALTH CARE EDUCATION/TRAINING PROGRAM

## 2025-01-15 PROCEDURE — 1159F MED LIST DOCD IN RCRD: CPT | Mod: CPTII,S$GLB,, | Performed by: STUDENT IN AN ORGANIZED HEALTH CARE EDUCATION/TRAINING PROGRAM

## 2025-01-15 PROCEDURE — 3008F BODY MASS INDEX DOCD: CPT | Mod: CPTII,S$GLB,, | Performed by: STUDENT IN AN ORGANIZED HEALTH CARE EDUCATION/TRAINING PROGRAM

## 2025-01-15 PROCEDURE — 3288F FALL RISK ASSESSMENT DOCD: CPT | Mod: CPTII,S$GLB,, | Performed by: STUDENT IN AN ORGANIZED HEALTH CARE EDUCATION/TRAINING PROGRAM

## 2025-01-15 PROCEDURE — 1160F RVW MEDS BY RX/DR IN RCRD: CPT | Mod: CPTII,S$GLB,, | Performed by: STUDENT IN AN ORGANIZED HEALTH CARE EDUCATION/TRAINING PROGRAM

## 2025-01-15 PROCEDURE — 99999 PR PBB SHADOW E&M-EST. PATIENT-LVL IV: CPT | Mod: PBBFAC,,, | Performed by: STUDENT IN AN ORGANIZED HEALTH CARE EDUCATION/TRAINING PROGRAM

## 2025-01-15 PROCEDURE — 1126F AMNT PAIN NOTED NONE PRSNT: CPT | Mod: CPTII,S$GLB,, | Performed by: STUDENT IN AN ORGANIZED HEALTH CARE EDUCATION/TRAINING PROGRAM

## 2025-01-15 PROCEDURE — 1101F PT FALLS ASSESS-DOCD LE1/YR: CPT | Mod: CPTII,S$GLB,, | Performed by: STUDENT IN AN ORGANIZED HEALTH CARE EDUCATION/TRAINING PROGRAM

## 2025-01-15 PROCEDURE — 99204 OFFICE O/P NEW MOD 45 MIN: CPT | Mod: S$GLB,,, | Performed by: STUDENT IN AN ORGANIZED HEALTH CARE EDUCATION/TRAINING PROGRAM

## 2025-01-15 PROCEDURE — 3079F DIAST BP 80-89 MM HG: CPT | Mod: CPTII,S$GLB,, | Performed by: STUDENT IN AN ORGANIZED HEALTH CARE EDUCATION/TRAINING PROGRAM

## 2025-01-15 RX ORDER — DUPILUMAB 300 MG/2ML
300 INJECTION, SOLUTION SUBCUTANEOUS
Qty: 52 ML | Refills: 1 | Status: ACTIVE | OUTPATIENT
Start: 2025-01-15

## 2025-01-15 NOTE — PROGRESS NOTES
Allergy Clinic Note  [facility]    This note was created by combination of typed  and dictation. Transcription errors are likely.  If there are any questions, please contact me.    Subjective:      Patient ID: Saba Hansen is a 69 y.o. male.    Chief Complaint: Cough      Referring Provider: Rm Botello    History of Present Illness: Saba Hansen is a 69 y.o. male history of nasal polyposis is referred from ENT for continuing care of same.  He is here alone, and the history is difficult.    Related medications and other interventions  Nasal rinse with budesonide .5 mg -- daily  Flonase  Astelin nasal  Advair MDI 1 puff twice a day  Rescue MDI as needed -- about twice a week    1/15/2025:  At initial visit, client reported 2 previous nasal and sinus surgeries.  Despite 2nd nasal/sinus surgery in 2024, bilateral nasal congestion/blocking rapidly recurred and his sense of smell is markedly diminished.  Most recent nasal endoscopy (11/14/2024) documents recurrence of nasal polyps bilaterally.       MEDICAL HISTORY      Significant past medical history:   Active problem list reviewed  ENT surgery:  Nasal/sinus surg 2024 and one previously; tonsillectomy    Significant family history:  Exposures:  Smoking Hx:  Client  reports that he has never smoked. He has never used smokeless tobacco.    Meds: MAR reviewed    Asthma: Yes  Eczema: No  Rhinitis: Yes  Drug allergy/intolerance: RCM  Venom allergy:  No  Latex allergy:  No    Patient Active Problem List   Diagnosis    Tonsillar mass    Chronic sinusitis    Chronic rhinitis    Moderate persistent asthma without complication    Nasal polyps    Allergic rhinitis    BPH with urinary obstruction    Erectile dysfunction due to arterial insufficiency    Other hyperlipidemia    Elevated prostate specific antigen (PSA)    Nuclear sclerotic cataract of right eye     Medication List with Changes/Refills   New Medications    DUPILUMAB (DUPIXENT PEN) 300 MG/2  ML PNIJ    Inject 2 mLs (300 mg total) into the skin every 14 (fourteen) days.       Start Date: 1/15/2025 End Date: --   Current Medications    ACETAMINOPHEN (TYLENOL) 325 MG TABLET    Take 2 tablets (650 mg total) by mouth every 6 (six) hours as needed for Pain.       Start Date: 4/15/2024 End Date: --    ALBUTEROL (PROVENTIL/VENTOLIN HFA) 90 MCG/ACTUATION INHALER    INHALE 2 PUFFS INTO THE LUNGS EVERY 6 HOURS AS NEEDED FOR WHEEZING       Start Date: 11/5/2024 End Date: --    ALCOHOL SWABS (DROPSAFE ALCOHOL PREP PADS) PADM    Apply 1 each topically once daily. As directed       Start Date: 9/25/2024 End Date: --    ATORVASTATIN (LIPITOR) 40 MG TABLET    Take 1 tablet (40 mg total) by mouth once daily. High cholesterol       Start Date: 8/2/2024  End Date: --    AZELASTINE (ASTELIN) 137 MCG (0.1 %) NASAL SPRAY    2 sprays (274 mcg total) by Nasal route 2 (two) times daily.       Start Date: 10/24/2023End Date: 1/15/2025    BUDESONIDE (PULMICORT) 0.5 MG/2 ML NEBULIZER SOLUTION    TAKE 2 MLS BY MOUTH ONCE DAILY. FOR USE IN SALINE IRRGATION AS DIRECTED       Start Date: 10/2/2024 End Date: --    CETIRIZINE (ZYRTEC) 10 MG TABLET    Take 1 tablet (10 mg total) by mouth once daily.       Start Date: 8/2/2024  End Date: 8/2/2025    FINASTERIDE (PROSCAR) 5 MG TABLET    Take 1 tablet (5 mg total) by mouth once daily. prostate       Start Date: 8/2/2024  End Date: --    FLUTICASONE PROPIONATE (FLONASE) 50 MCG/ACTUATION NASAL SPRAY    2 sprays (100 mcg total) by Each Nostril route 2 (two) times a day.       Start Date: 10/24/2023End Date: --    FLUTICASONE-SALMETEROL 230-21 MCG/DOSE (ADVAIR HFA) 230-21 MCG/ACTUATION HFAA INHALER    Inhale 2 puffs into the lungs 2 (two) times daily.       Start Date: 8/2/2024  End Date: --    IBUPROFEN (ADVIL,MOTRIN) 800 MG TABLET    Take 1 tablet (800 mg total) by mouth every 6 (six) hours as needed for Pain (L shoulder pain (take with food)).       Start Date: 8/2/2024  End Date: --     "LANCETS (TRUEPLUS LANCETS) 33 GAUGE MISC    1 lancet by Misc.(Non-Drug; Combo Route) route once daily.       Start Date: 12/7/2022 End Date: --    MONTELUKAST (SINGULAIR) 10 MG TABLET    Take 1 tablet (10 mg total) by mouth every evening. Asthma / allergies       Start Date: 8/2/2024  End Date: --    PAPAVERINE 30 MG/ML INJECTION    Add: Phentolamine 1 mg  Add: PGE1 10 mcg    Sig:  Inject 30 units as directed; titrate up by 5 units until desired results       Start Date: 12/1/2023 End Date: --    TAMSULOSIN (FLOMAX) 0.4 MG CAP    Take 1 capsule (0.4 mg total) by mouth once daily. prostate       Start Date: 8/2/2024  End Date: --         REVIEW OF SYSTEMS      CONST: no F/C/NS, no unintentional weight changes  NEURO:  no tremor, no weakness  EYES: no discharge, no erythema  EARS: no hearing loss, no sensation of fullness  PULM:  no SOB, no wheezing, no cough  CV: no CP, no palpitations  DERM: no rashes, no skin breaks    PHYSICAL EXAM     BP (!) 149/89 (BP Location: Left arm, Patient Position: Sitting)   Pulse 68   Ht 6' 1" (1.854 m)   Wt 85.6 kg (188 lb 11.4 oz)   SpO2 96%   BMI 24.90 kg/m²   GEN: Awake and alert, no distress  DERM: No flushing, No rashes  EYE:  No ocular discharge  HENT: No nasal discharge, no hoarseness, nares pink with moderate swelling, glistening material visible proximally in left naris, oropharanx benign without exudate.  Tongue is not coated.  No LAD.  PULM: Normal work of breathing, no cough  NEURO:  No focal deficit, speech fluent and logical  PSYCH: appropriate affect, normal behavior    MEDICAL DECISION MAKING     Data reviewed:       (new entries in bold-face)      Allergy Testing   2018 Immunocaps   Component      Latest Ref Rng 4/27/2018   Cat Dander IgE      <0.35 kU/L <0.35    Cat Epithelium Class CLASS 0    Cockroach, IgE      <0.35 kU/L <0.35    Cockroach, IgE       CLASS 0    Dog Dander IgE      <0.35 kU/L <0.35    Dog Dander Class CLASS 0    D. pteronyssinus Dust Mite IgE   "    <0.35 kU/L <0.35    D. pteronyssinus Class CLASS 0    D. farinae      <0.35 kU/L <0.35    D. farinae Class CLASS 0    [place] Grass IgE      <0.35 kU/L 2.80 (H)    [place] Grass Class CLASS II    [place] Grass IgE      <0.35 kU/L 1.40 (H)    [place] Class CLASS II    Don Grass IgE      <0.35 kU/L 0.83 (H)    Don Grass Class CLASS II    Mark Grass IgE      <0.35 kU/L 1.19 (H)    Mark Grass Class CLASS II    A. alternata IgE      <0.35 kU/L <0.35    Altern. alternata Class CLASS 0    Aspergillus Fumigatus IgE      <0.35 kU/L <0.35    A. fumigatus Class CLASS 0    Cladosporium IgE      <0.35 kU/L <0.35    Cladosporium Class CLASS 0    Chaetomium      <0.35 kU/L <0.35    Chaetomium Glob. Class CLASS 0    Curvularia lunata      <0.35 kU/L <0.35    Curvularia Lunata Class CLASS 0    Helminthosporium Halodes IgE      <0.35 kU/L <0.35    Helminthosporium Class CLASS 0    Penicillium IgE      <0.35 kU/L <0.35    Penicillium Class CLASS 0    [place] Tree IgE      <0.35 kU/L 1.95 (H)    [place] Class CLASS II    [place]      <0.35 kU/L 3.02 (H)    Bald [place] Class CLASS II    Maple/Sycamore IgE      <0.35 kU/L 2.83 (H)    Maple/[place] Class CLASS II    [place] Tree IgE      <0.35 kU/L 1.48 (H)    Oak, Class CLASS II    [place] Tree IgE      <0.35 kU/L <0.35    White Pine Class CLASS 0    Pecan Granville Tree IgE      <0.35 kU/L 4.52 (H)    Pecan, Class CLASS III    Silver Birch Tree IgE      <0.35 kU/L 3.45 (H)    Silver Birch Class CLASS II    Lucas Tree IgE      <0.35 kU/L 7.16 (H)    Lucas Tree Class CLASS III    [place], IgE      <0.35 kU/L 1.63 (H)    [place] Class CLASS II    English Plantain IgE      <0.35 kU/L 3.39 (H)    English Plantain Class CLASS II    Marshelder IgE      <0.35 kU/L 1.62 (H)    Marshelder Class CLASS II    MUGWORT      <0.35 kU/L 2.44 (H)    Mugwort Class CLASS II    Ragweed, Short, IgE      <0.35 kU/L 17.50 (H)    Ragweed, Short, Class CLASS IV    Ragweed, Western IgE       <0.35 kU/L 5.97 (H)    Ragweed, Western, Class CLASS III    Russian Thistle IgE      <0.35 kU/L 2.95 (H)    Russian Thistle Class CLASS II    Total IgE      0 - 100 IU/mL 102 (H)             Lab results   (new entries in bold face)      Eo count 1000, 2024.  Peak eo count 2900       Imaging and other diagnostics      Post op Nasal Endoscopy (11/14/2024, Dr. Botello, 7 months after most recent nasal/sinus surgery)  External:      No external nasal deformity   Intranasal:      Mucosa polyps      Mucosa ulcers not present      No mucosa lesions present      Enlarged turbinates      No septum gross deformity   Nasopharynx:      No mucosa lesions      Adenoids not present      Posterior choanae patent      Eustachian tube not patent      Grade 3 polyps on left      Grade 1 polyp on right   Scattered mucoid exudate, greater on left       Medical records review           Diagnoses:     Saba Hansen is a 69 y.o. male. with  1. Nasal polyposis    2. Moderate persistent asthma, unspecified whether complicated    3. Chronic rhinitis    4. Eosinophilia, unspecified type          Assessment / Plan   Client has recurrent nasal polyposis status post 2 previous nasal and sinus surgeries.  Despite 2nd nasal/sinus surgery in 2024, bilateral nasal congestion/blocking rapidly recurred and his sense of smell is markedly diminished.  Most recent nasal endoscopy (11/14/2024) documents recurrence of nasal polyps bilaterally.  Specifically he has grade 3 polyp on the left and grade 1 polyp on the right despite compliance with budesonide nasal rinses.  Patient clearly has recurrent nasal polyposis refractory to medical and surgical interventions.  He is an excellent candidate for Dupixent therapy.  Discussed regimen and risks/benefits.  Client would like to proceed.  Order sent to [facility]. Follow up in clinic in 4 weeks.    Client also has persistent asthma, allergic rhinitis due to pollen, and marked peripheral eosinophilia.      Nasal  polyposis  -     Ambulatory referral/consult to Allergy  -     dupilumab (DUPIXENT PEN) 300 mg/2 mL PnIj; Inject 2 mLs (300 mg total) into the skin every 14 (fourteen) days.  Dispense: 52 mL; Refill: 1    Moderate persistent asthma- fair control      -     Continue Advair for now      -     Expect improvement with Dupixent therapy for nasal polyps            Chronic rhinosinusitis  -     IgE; Future; Expected date: 01/15/2025  -     Dermatophagoides [place]; Future; Expected date: 01/15/2025  -     Dermatophagoides Pteronyssinus; Future; Expected date: 01/15/2025  -     Bermuda; Future; Expected date: 01/15/2025  -     Mark; Future; Expected date: 01/15/2025  -     Day; Future; Expected date: 01/15/2025  -     English Plantain; Future; Expected date: 01/15/2025  -     Oak; Future; Expected date: 01/15/2025  -     Pecan; Future; Expected date: 01/15/2025  -     Ragweed; Future; Expected date: 01/15/2025  -     Alternaria; Future; Expected date: 01/15/2025  -     Aspergillus; Future; Expected date: 01/15/2025  -     Cat; Future; Expected date: 01/15/2025  -     Dog; Future; Expected date: 01/15/2025  -     CBC Auto Differential; Future; Expected date: 01/15/2025    Eosinophilia, unspecified type  -     CBC Auto Differential; Future; Expected date: 01/15/2025        Comorbidities  S/p nasal sinus surg x2      PATIENT INSTRUCTIONS AND FOLLOW-UP     Patient Instructions   Testing  Blood work for allergy testing today       Check [facility] in one week for results or call [phone]       Contact me with questions or concerns       I will contact you if anything needs immediate attention.        Treatment  Plan Dupixent injections if/when approved    Continue daily nasal rinses with budesonide    Flonase 1-2 squirts once or twicer daily      Follow up in 4 weeks to d    Follow up in about 4 weeks (around 2/12/2025).        Emily Otto MD  Allergy, Asthma & Immunology        I spent a total of 53 minutes on the day of  the visit.This includes face to face time and non-face to face time preparing to see the patient (eg, review of tests), obtaining and/or reviewing separately obtained history, documenting clinical information in the electronic or other health record, independently interpreting results and communicating results to the patient/family/caregiver, or care coordinator.

## 2025-01-15 NOTE — PATIENT INSTRUCTIONS
Testing  Blood work for allergy testing today       Check [facility] in one week for results or call [phone]       Contact me with questions or concerns       I will contact you if anything needs immediate attention.        Treatment  Plan Dupixent injections if/when approved    Continue daily nasal rinses with budesonide    Flonase 1-2 squirts once or twicer daily      Follow up in 4 weeks to d

## 2025-01-17 ENCOUNTER — LAB VISIT (OUTPATIENT)
Dept: LAB | Facility: HOSPITAL | Age: 70
End: 2025-01-17
Attending: STUDENT IN AN ORGANIZED HEALTH CARE EDUCATION/TRAINING PROGRAM
Payer: MEDICARE

## 2025-01-17 DIAGNOSIS — N13.8 BPH WITH URINARY OBSTRUCTION: ICD-10-CM

## 2025-01-17 DIAGNOSIS — N40.1 BPH WITH URINARY OBSTRUCTION: ICD-10-CM

## 2025-01-17 DIAGNOSIS — R97.20 ELEVATED PROSTATE SPECIFIC ANTIGEN (PSA): ICD-10-CM

## 2025-01-17 DIAGNOSIS — D72.10 EOSINOPHILIA, UNSPECIFIED TYPE: ICD-10-CM

## 2025-01-17 DIAGNOSIS — J31.0 CHRONIC RHINITIS: ICD-10-CM

## 2025-01-17 DIAGNOSIS — N52.01 ERECTILE DYSFUNCTION DUE TO ARTERIAL INSUFFICIENCY: ICD-10-CM

## 2025-01-17 LAB
BASOPHILS # BLD AUTO: 0.05 K/UL (ref 0–0.2)
BASOPHILS NFR BLD: 0.6 % (ref 0–1.9)
CREAT SERPL-MCNC: 0.8 MG/DL (ref 0.5–1.4)
DIFFERENTIAL METHOD BLD: ABNORMAL
EOSINOPHIL # BLD AUTO: 1.2 K/UL (ref 0–0.5)
EOSINOPHIL NFR BLD: 14.8 % (ref 0–8)
ERYTHROCYTE [DISTWIDTH] IN BLOOD BY AUTOMATED COUNT: 13.3 % (ref 11.5–14.5)
EST. GFR  (NO RACE VARIABLE): >60 ML/MIN/1.73 M^2
HCT VFR BLD AUTO: 44.4 % (ref 40–54)
HGB BLD-MCNC: 14.3 G/DL (ref 14–18)
IGE SERPL-ACNC: 110 IU/ML (ref 0–100)
IMM GRANULOCYTES # BLD AUTO: 0.01 K/UL (ref 0–0.04)
IMM GRANULOCYTES NFR BLD AUTO: 0.1 % (ref 0–0.5)
LYMPHOCYTES # BLD AUTO: 3.2 K/UL (ref 1–4.8)
LYMPHOCYTES NFR BLD: 40 % (ref 18–48)
MCH RBC QN AUTO: 28.5 PG (ref 27–31)
MCHC RBC AUTO-ENTMCNC: 32.2 G/DL (ref 32–36)
MCV RBC AUTO: 89 FL (ref 82–98)
MONOCYTES # BLD AUTO: 0.6 K/UL (ref 0.3–1)
MONOCYTES NFR BLD: 6.8 % (ref 4–15)
NEUTROPHILS # BLD AUTO: 3 K/UL (ref 1.8–7.7)
NEUTROPHILS NFR BLD: 37.7 % (ref 38–73)
NRBC BLD-RTO: 0 /100 WBC
PLATELET # BLD AUTO: 191 K/UL (ref 150–450)
PMV BLD AUTO: 10.7 FL (ref 9.2–12.9)
RBC # BLD AUTO: 5.01 M/UL (ref 4.6–6.2)
WBC # BLD AUTO: 8.05 K/UL (ref 3.9–12.7)

## 2025-01-17 PROCEDURE — 86003 ALLG SPEC IGE CRUDE XTRC EA: CPT | Performed by: STUDENT IN AN ORGANIZED HEALTH CARE EDUCATION/TRAINING PROGRAM

## 2025-01-17 PROCEDURE — 86003 ALLG SPEC IGE CRUDE XTRC EA: CPT | Mod: 59 | Performed by: STUDENT IN AN ORGANIZED HEALTH CARE EDUCATION/TRAINING PROGRAM

## 2025-01-17 PROCEDURE — 82565 ASSAY OF CREATININE: CPT | Performed by: NURSE PRACTITIONER

## 2025-01-17 PROCEDURE — 85025 COMPLETE CBC W/AUTO DIFF WBC: CPT | Performed by: STUDENT IN AN ORGANIZED HEALTH CARE EDUCATION/TRAINING PROGRAM

## 2025-01-17 PROCEDURE — 82785 ASSAY OF IGE: CPT | Performed by: STUDENT IN AN ORGANIZED HEALTH CARE EDUCATION/TRAINING PROGRAM

## 2025-01-21 LAB
A ALTERNATA IGE QN: <0.1 KU/L
A FUMIGATUS IGE QN: <0.1 KU/L
BERMUDA GRASS IGE QN: 1.37 KU/L
CAT DANDER IGE QN: <0.1 KU/L
CEDAR IGE QN: 1.07 KU/L
D FARINAE IGE QN: <0.1 KU/L
D PTERONYSS IGE QN: <0.1 KU/L
DEPRECATED CEDAR IGE RAST QL: ABNORMAL
DEPRECATED TIMOTHY IGE RAST QL: ABNORMAL
DOG DANDER IGE QN: 0.43 KU/L
ENGL PLANTAIN IGE QN: 1.2 KU/L
PECAN/HICK TREE IGE QN: 1.43 KU/L
RAST CLASS: ABNORMAL
RAST CLASS: NORMAL
TIMOTHY IGE QN: 0.31 KU/L
WEST RAGWEED IGE QN: 10.5 KU/L
WHITE OAK IGE QN: 0.16 KU/L

## 2025-02-25 NOTE — PROGRESS NOTES
Allergy Clinic Note  [facility]    This note was created by combination of typed  and dictation. Transcription errors are likely.  If there are any questions, please contact me.    Subjective:      Patient ID: Saba Hansen is a 70 y.o. male.    Chief Complaint: Nasal Polyps      Allergy problem list:    Recurrent nasal polyposis status post surgery x2  Persistent asthma  Chronic rhinitis    History of Present Illness: Saba Hansen is a 70 y.o. male with nasal polyposis and asthma returns to follow up symptoms and test results..  He is here alone, and he is a fair historian    Related medications and other interventions  Dupixent for nasal polyps 300 mg pen every 14 days -- s/p first 2 doses  Nasal rinse with budesonide .5 mg -- daily  Flonase alternates with Astelin  Astelin nasal 2 qd  Advair /21, 2 puffs AM (prescribed BID)  Rescue MDI as needed -- about 2-3x a week    2/26/25:  Since last visit, client began treatment Dupixent.  He is worth a decrease in nasal congestion video attributes to the injections.  No side effects or other problems with the medicine.  He does report popping of his left ear.  He is currently alternating Astelin and Flonase.  I instructed him to use Flonase 2 squirts each nostril twice daily until they ear popping resolves.    Client admits asthma 2 to 3 times a week, with some limitations and occasional nocturnal awakenings. ACT 17.  He is currently using Advair 230/21, 2 puffs daily (prescribed twice daily).  I instructed him to increase to twice daily through the end of March.  Follow up April.    1/15/2025:  At initial visit, client reported 2 previous nasal and sinus surgeries.  Despite 2nd nasal/sinus surgery in 2024, bilateral nasal congestion/blocking rapidly recurred and his sense of smell is markedly diminished.  Most recent nasal endoscopy (11/14/2024) documents recurrence of nasal polyps bilaterally.       MEDICAL HISTORY      Significant past  "medical history: none  Active problem list reviewed  ENT surgery:  Nasal/sinus surg 2024 and one previously; tonsillectomy    Smoking Hx:  Client  reports that he has never smoked. He has never used smokeless tobacco.    Meds: MAR reviewed    Asthma: Yes  Eczema: No  Rhinitis: Yes  Drug allergy/intolerance: RCM  Venom allergy:  No  Latex allergy:  No     REVIEW OF SYSTEMS      CONST: no F/C/NS, no unintentional weight changes  NEURO:  no tremor, no weakness  EYES: no discharge, no erythema  EARS: no hearing loss, no sensation of fullness  PULM:  no SOB, no wheezing, no cough  CV: no CP, no palpitations  DERM: no rashes, no skin breaks    PHYSICAL EXAM   /78 (BP Location: Left arm, Patient Position: Sitting)   Pulse 70   Ht 6' 1" (1.854 m)   Wt 84.4 kg (186 lb 1.1 oz)   SpO2 97%   BMI 24.55 kg/m²   GEN: Awake and alert, no distress  DERM:  No flushing, no rashes  EYE:  No occular discharge, no redness  HEENT: No nasal discharge, no hoarseness, TMs are translucent bilaterally.  L TM shows red sterwaks. Nares are pink with moderate inferior turbinate swelling.Probable Polyp(s) visible proximally on left.  Oropharynx is benign without exudate.  Tongue is not coated.  NECK:  No LAD  PULM: Normal work of breathing, no cough, CTA  COR:  RRR, normal capilary refill, normal radial pulse  NEURO:  No focal deficit, speech fluent and logical  PSYCH: appropriate affect, normal behavior    MEDICAL DECISION MAKING     Data reviewed:       (new entries in bold-face)      Allergy Testing     Inhalant immunocaps positive to tree, grass, and weed pollen, 2025      Class III ragweed      Class II  Bermuda grass, Cedar, English plantain, pecan pollen      Class I   dog   All other aeroallergens less than 0.35 KU/L.  Mark grass 0.31.      Lab results   (new entries in bold face)      Total IgE 110, 2025  Eo count 1200, 2025,       Imaging and other diagnostics      Post op Nasal Endoscopy (11/14/2024, Dr. Botello, 7 months " after most recent nasal/sinus surgery)  External:      No external nasal deformity   Intranasal:      Mucosa polyps      Mucosa ulcers not present      No mucosa lesions present      Enlarged turbinates      No septum gross deformity   Nasopharynx:      No mucosa lesions      Adenoids not present      Posterior choanae patent      Eustachian tube not patent      Grade 3 polyps on left      Grade 1 polyp on right   Scattered mucoid exudate, greater on left       Medical records review      Diagnoses:     Saba Hansen is a 70 y.o. male. with  No diagnosis found.        Assessment / Plan   Client has recurrent nasal polyposis status post 2 previous nasal and sinus surgeries. He recently started therapy with Dupixent and already reports a decrease in nasal congestion.  Continue.  Follow up in 2 months.    Client also has persistent asthma which is not adequately controlled on daily Advair.  Recommend increasing to twice daily through the end of March.  Anticipate that Dupixent therapy will help his asthma.    Client also has allergic rhinitis due to pollen, and marked peripheral eosinophilia.  Reviewed immuno caps positive for tree grass and weed pollen.  For his current ear popping, I recommended Flonase 2 squirts each nostril twice daily until the symptom resolves.      Nasal polyposis  -     dupilumab (DUPIXENT PEN) 300 mg/2 mL PnIj; Inject 2 mLs (300 mg total) into the skin every 14 (fourteen) days.  Dispense: 52 mL; Refill: 1    Moderate persistent asthma- fair control      -     Increase Advair to twice daily through the end of March      -     Expect improvement with Dupixent therapy for nasal polyps      Left Eustachian tube dysfunction manifest by ear popping       -Flonase 2 squirts each nostril until resolved          Chronic  allergic rhinitis due to TGW pollen  -Flonase as above and/or Astelin as needed    Eosinophilia (500-1500) -- probably due to atopy       - Noted    Comorbidities  S/p nasal sinus  surg x2      INSTRUCTIONS AND FOLLOW-UP     Patient Instructions     Increase purple pump to 2 puffs twice a day until the end of March    Use fluticasone nasal spray 2 squirts twice a day until your ear feels back to normal.    Return April 2025    Follow up in about 8 weeks (around 4/23/2025).        Emily Otto MD  Allergy, Asthma & Immunology    Letter to Dr Li and Dr Avendano sent    I spent a total of 36 minutes on the day of the visit.This includes face to face time and non-face to face time preparing to see the patient (eg, review of tests), obtaining and/or reviewing separately obtained history, documenting clinical information in the electronic or other health record, independently interpreting results and communicating results to the patient/family/caregiver, or care coordinator.

## 2025-02-26 ENCOUNTER — OFFICE VISIT (OUTPATIENT)
Dept: ALLERGY | Facility: CLINIC | Age: 70
End: 2025-02-26
Payer: MEDICARE

## 2025-02-26 VITALS
BODY MASS INDEX: 24.66 KG/M2 | SYSTOLIC BLOOD PRESSURE: 133 MMHG | WEIGHT: 186.06 LBS | OXYGEN SATURATION: 97 % | HEIGHT: 73 IN | DIASTOLIC BLOOD PRESSURE: 78 MMHG | HEART RATE: 70 BPM

## 2025-02-26 DIAGNOSIS — J30.1 ALLERGIC RHINITIS DUE TO POLLEN, UNSPECIFIED SEASONALITY: Chronic | ICD-10-CM

## 2025-02-26 DIAGNOSIS — J33.9 NASAL POLYPOSIS: Primary | ICD-10-CM

## 2025-02-26 DIAGNOSIS — J45.40 MODERATE PERSISTENT ASTHMA, UNSPECIFIED WHETHER COMPLICATED: ICD-10-CM

## 2025-02-26 PROCEDURE — 1101F PT FALLS ASSESS-DOCD LE1/YR: CPT | Mod: CPTII,S$GLB,, | Performed by: STUDENT IN AN ORGANIZED HEALTH CARE EDUCATION/TRAINING PROGRAM

## 2025-02-26 PROCEDURE — 3078F DIAST BP <80 MM HG: CPT | Mod: CPTII,S$GLB,, | Performed by: STUDENT IN AN ORGANIZED HEALTH CARE EDUCATION/TRAINING PROGRAM

## 2025-02-26 PROCEDURE — 1159F MED LIST DOCD IN RCRD: CPT | Mod: CPTII,S$GLB,, | Performed by: STUDENT IN AN ORGANIZED HEALTH CARE EDUCATION/TRAINING PROGRAM

## 2025-02-26 PROCEDURE — 99999 PR PBB SHADOW E&M-EST. PATIENT-LVL IV: CPT | Mod: PBBFAC,,, | Performed by: STUDENT IN AN ORGANIZED HEALTH CARE EDUCATION/TRAINING PROGRAM

## 2025-02-26 PROCEDURE — 3008F BODY MASS INDEX DOCD: CPT | Mod: CPTII,S$GLB,, | Performed by: STUDENT IN AN ORGANIZED HEALTH CARE EDUCATION/TRAINING PROGRAM

## 2025-02-26 PROCEDURE — G2211 COMPLEX E/M VISIT ADD ON: HCPCS | Mod: S$GLB,,, | Performed by: STUDENT IN AN ORGANIZED HEALTH CARE EDUCATION/TRAINING PROGRAM

## 2025-02-26 PROCEDURE — 3288F FALL RISK ASSESSMENT DOCD: CPT | Mod: CPTII,S$GLB,, | Performed by: STUDENT IN AN ORGANIZED HEALTH CARE EDUCATION/TRAINING PROGRAM

## 2025-02-26 PROCEDURE — 1126F AMNT PAIN NOTED NONE PRSNT: CPT | Mod: CPTII,S$GLB,, | Performed by: STUDENT IN AN ORGANIZED HEALTH CARE EDUCATION/TRAINING PROGRAM

## 2025-02-26 PROCEDURE — 1160F RVW MEDS BY RX/DR IN RCRD: CPT | Mod: CPTII,S$GLB,, | Performed by: STUDENT IN AN ORGANIZED HEALTH CARE EDUCATION/TRAINING PROGRAM

## 2025-02-26 PROCEDURE — 3075F SYST BP GE 130 - 139MM HG: CPT | Mod: CPTII,S$GLB,, | Performed by: STUDENT IN AN ORGANIZED HEALTH CARE EDUCATION/TRAINING PROGRAM

## 2025-02-26 PROCEDURE — 99214 OFFICE O/P EST MOD 30 MIN: CPT | Mod: S$GLB,,, | Performed by: STUDENT IN AN ORGANIZED HEALTH CARE EDUCATION/TRAINING PROGRAM

## 2025-02-26 NOTE — LETTER
February 26, 2025        Rm Botello MD  [address]  [address]  [place] [address]             [facility] ([facility])  [address]  [place] [address]  Phone: [phone]  Fax: [phone]   Patient: Saba Hansen   MR Number: [id number]   YOB: 1955   Date of Visit: 2/26/2025     Dear Dr. Botello,     Thank you for referring Mr Hansen for continuing care of recurrent nasal polyps.  Following 2 doses of Dupixent, he has already noted decrease in nasal congestion.  I expect improvement in his asthma and allergic rhinitis (tree, grass, weed pollen) over the next several weeks.    Sincerely,      Emily Otto MD            CC  Drew Avendano MD

## 2025-02-26 NOTE — PATIENT INSTRUCTIONS
Increase purple pump to 2 puffs twice a day until the end of March    Use fluticasone nasal spray 2 squirts twice a day until your ear feels back to normal.    Return April 2025

## 2025-04-17 NOTE — PROGRESS NOTES
Allergy Clinic Note  [facility]    This note was created by combination of typed  and dictation. Transcription errors are likely.  If there are any questions, please contact me.    Subjective:      Patient ID: Saba Hansen is a 70 y.o. male.    Chief Complaint: Nasal Polyps      Allergy problem list:    Recurrent nasal polyposis status post surgery x2  Persistent asthma  Chronic rhinitis    History of Present Illness: Saba Hansen is a 70 y.o. male with nasal polyposis and asthma returns to follow up symptoms and test results.  He is here alone, and he is a fair historian    Related medications and other interventions  Dupixent for nasal polyps 300 mg pen every 14 days since Jan 2025  Nasal rinse with budesonide .5 mg  One nasal spray -- nit sure whether fluticasone or azelastine  Advair /21, 2 puffs twice daily -- regular use  Rescue MDI as needed     4/21/25:  Reports that he is able to smell thing for the first time in 15 years.  Still complains of nasal congestion on the left.  No side effects from any medications.  States regular use of Advair.  Reports needing albuterol about twice, usually with talking too much or walking too much. ACT 12. Last PFTs 2018.  Ordered interval PFTs prior to next visit 3 months.  For nasal congestion, recommended using Flonase rather than Astelin.    2/26/25:  Since last visit, client began treatment Dupixent.  He notes decrease in nasal congestion attributed to the injections.  No side effects or other problems with the medicine.  He does report popping of his left ear.  He is currently alternating Astelin and Flonase.  I instructed him to use Flonase 2 squirts each nostril twice daily until they ear popping resolves.    Client admits asthma 2 to 3 times a week, with some limitations and occasional nocturnal awakenings. ACT 17.  He is currently using Advair 230/21, 2 puffs daily (prescribed twice daily).  I instructed him to increase to twice daily  "through the end of March.  Follow up April.    1/15/2025:  At initial visit, client reported 2 previous nasal and sinus surgeries.  Despite 2nd nasal/sinus surgery in 2024, bilateral nasal congestion/blocking rapidly recurred and his sense of smell is markedly diminished.  Most recent nasal endoscopy (11/14/2024) documents recurrence of nasal polyps bilaterally.       MEDICAL HISTORY      Significant past medical history: none  Active problem list reviewed  ENT surgery:  Nasal/sinus surgery 2024 and one previously; tonsillectomy    Smoking Hx:  Client  reports that he has never smoked. He has never used smokeless tobacco.    Meds: MAR reviewed    Asthma: Yes  Eczema: No  Rhinitis: Yes  Drug allergy/intolerance: RCM  Venom allergy:  No  Latex allergy:  No     REVIEW OF SYSTEMS      CONST: no F/C/NS, no unintentional weight changes  NEURO:  no tremor, no weakness  EYES: no discharge, no erythema  EARS: no hearing loss, no sensation of fullness  PULM:  no SOB, no wheezing, no cough  CV: no CP, no palpitations  DERM: no rashes, no skin breaks    PHYSICAL EXAM   /74 (BP Location: Left arm, Patient Position: Sitting)   Pulse 81   Ht 6' 1" (1.854 m)   Wt 84 kg (185 lb 3 oz)   SpO2 96%   BMI 24.43 kg/m²   GEN: Awake and alert, no distress  DERM:  No flushing, no rashes  EYE:  No ocular discharge, no redness  HENT: No nasal discharge, no hoarseness, TMs are translucent bilaterally.  Nares are pink.  There is marked inferior turbinate swelling on the left only.  No polypoid tissue seen.    Oropharynx is benign without exudate.  Tongue is not coated.  NECK:  No LAD  PULM: Normal work of breathing, no cough, CTA  COR:  RRR, normal capillary refill, normal radial pulse  NEURO:  No focal deficit, speech fluent and logical  PSYCH: appropriate affect, normal behavior  EXTREM:  No edema    MEDICAL DECISION MAKING     Data reviewed:       (new entries in bold-face)      Allergy Testing     Inhalant Immunocaps positive to " tree, grass, and weed pollen, 2025      Class III ragweed      Class II  Bermuda grass, Cedar, English plantain, pecan pollen      Class I   dog   All other aeroallergens less than 0.35 KU/L.  Mark grass 0.31.      Lab results   (new entries in bold face)      Total IgE 110, 2025  Eo count 1200, 2025,       Imaging and other diagnostics      Post op Nasal Endoscopy (11/14/2024, Dr. Botello, 7 months after most recent nasal/sinus surgery)  External:      No external nasal deformity   Intranasal:      Mucosa polyps      Mucosa ulcers not present      No mucosa lesions present      Enlarged turbinates      No septum gross deformity   Nasopharynx:      No mucosa lesions      Adenoids not present      Posterior choanae patent      Eustachian tube not patent      Grade 3 polyps on left      Grade 1 polyp on right   Scattered mucoid exudate, greater on left       Medical records review      Diagnoses:     Saba Hansen is a 70 y.o. male. with  1. Nasal polyposis    2. Moderate persistent asthma, unspecified whether complicated    3. Allergic rhinitis due to TGW pollen    4. Chronic allergic rhinitis    5. Nasal turbinate hypertrophy        Assessment / Plan   On Dupixent for the last 3 months, nasal polyps are significantly improved and his sense of smell has returned.  Client has recurrent nasal polyposis status post 2 previous nasal and sinus surgeries.  Recommend continuing Dupixent and following up in 3 months.    Client has a diagnose is persistent asthma and continues with frequent need for albuterol despite Dupixent therapy.  Recommend complete PFTs at his convenience.  Continue same doses for now.    Client also has allergic rhinitis due to pollen, and marked peripheral eosinophilia.  He also has significant nasal turbinate hypertrophy on the left only.  I recommend Flonase 2 squirts each nostril daily (and not using azelastine).    Nasal polyposis -- in remission on Dupixent   Continue dupilumab (DUPIXENT PEN)  300 every 14 days.     Chronic  allergic rhinitis due to TGW pollen   with Nasal turbinate hypertrophy        Flonase 2 squirts each nostril daily    Moderate persistent asthma- ?control        Complete PFTs on routine basis        Continue Advair to twice daily         Expect improvement with Dupixent therapy for polyps        Comorbidities  S/p nasal sinus surg x2  Eosinophilia (500-1500) -- probably due to atopy    INSTRUCTIONS AND FOLLOW-UP     Patient Instructions     Continue injections and pumps    Flonase (= fluticasone) nasal spray:  2 squirts each nostril every day   Remember to aim out toward your ear.   Needs to be used regularly 5-14 days for full effect.    (You do not need to use the nos spray that starts with an A.)    Follow up in 3 months        Follow up in about 3 months (around 7/21/2025).        Emily Otto MD  Allergy, Asthma & Immunology    Letter to Dr Botello and Dr Avendano sent    I spent a total of 40 minutes on the day of the visit.This includes face to face time and non-face to face time preparing to see the patient (eg, review of tests), obtaining and/or reviewing separately obtained history, documenting clinical information in the electronic or other health record, independently interpreting results and communicating results to the patient/family/caregiver, or care coordinator.

## 2025-04-21 ENCOUNTER — OFFICE VISIT (OUTPATIENT)
Dept: ALLERGY | Facility: CLINIC | Age: 70
End: 2025-04-21
Payer: MEDICARE

## 2025-04-21 VITALS
SYSTOLIC BLOOD PRESSURE: 116 MMHG | HEIGHT: 73 IN | DIASTOLIC BLOOD PRESSURE: 74 MMHG | OXYGEN SATURATION: 96 % | WEIGHT: 185.19 LBS | HEART RATE: 81 BPM | BODY MASS INDEX: 24.54 KG/M2

## 2025-04-21 DIAGNOSIS — J45.40 MODERATE PERSISTENT ASTHMA, UNSPECIFIED WHETHER COMPLICATED: ICD-10-CM

## 2025-04-21 DIAGNOSIS — J34.3 NASAL TURBINATE HYPERTROPHY: ICD-10-CM

## 2025-04-21 DIAGNOSIS — J30.9 CHRONIC ALLERGIC RHINITIS: ICD-10-CM

## 2025-04-21 DIAGNOSIS — J30.1 ALLERGIC RHINITIS DUE TO POLLEN, UNSPECIFIED SEASONALITY: ICD-10-CM

## 2025-04-21 DIAGNOSIS — J33.9 NASAL POLYPOSIS: Primary | ICD-10-CM

## 2025-04-21 PROCEDURE — 3008F BODY MASS INDEX DOCD: CPT | Mod: CPTII,S$GLB,, | Performed by: STUDENT IN AN ORGANIZED HEALTH CARE EDUCATION/TRAINING PROGRAM

## 2025-04-21 PROCEDURE — 99215 OFFICE O/P EST HI 40 MIN: CPT | Mod: S$GLB,,, | Performed by: STUDENT IN AN ORGANIZED HEALTH CARE EDUCATION/TRAINING PROGRAM

## 2025-04-21 PROCEDURE — 3078F DIAST BP <80 MM HG: CPT | Mod: CPTII,S$GLB,, | Performed by: STUDENT IN AN ORGANIZED HEALTH CARE EDUCATION/TRAINING PROGRAM

## 2025-04-21 PROCEDURE — 1160F RVW MEDS BY RX/DR IN RCRD: CPT | Mod: CPTII,S$GLB,, | Performed by: STUDENT IN AN ORGANIZED HEALTH CARE EDUCATION/TRAINING PROGRAM

## 2025-04-21 PROCEDURE — 3288F FALL RISK ASSESSMENT DOCD: CPT | Mod: CPTII,S$GLB,, | Performed by: STUDENT IN AN ORGANIZED HEALTH CARE EDUCATION/TRAINING PROGRAM

## 2025-04-21 PROCEDURE — 1126F AMNT PAIN NOTED NONE PRSNT: CPT | Mod: CPTII,S$GLB,, | Performed by: STUDENT IN AN ORGANIZED HEALTH CARE EDUCATION/TRAINING PROGRAM

## 2025-04-21 PROCEDURE — 1159F MED LIST DOCD IN RCRD: CPT | Mod: CPTII,S$GLB,, | Performed by: STUDENT IN AN ORGANIZED HEALTH CARE EDUCATION/TRAINING PROGRAM

## 2025-04-21 PROCEDURE — 99999 PR PBB SHADOW E&M-EST. PATIENT-LVL IV: CPT | Mod: PBBFAC,,, | Performed by: STUDENT IN AN ORGANIZED HEALTH CARE EDUCATION/TRAINING PROGRAM

## 2025-04-21 PROCEDURE — 3074F SYST BP LT 130 MM HG: CPT | Mod: CPTII,S$GLB,, | Performed by: STUDENT IN AN ORGANIZED HEALTH CARE EDUCATION/TRAINING PROGRAM

## 2025-04-21 PROCEDURE — 1101F PT FALLS ASSESS-DOCD LE1/YR: CPT | Mod: CPTII,S$GLB,, | Performed by: STUDENT IN AN ORGANIZED HEALTH CARE EDUCATION/TRAINING PROGRAM

## 2025-04-21 RX ORDER — FLUTICASONE PROPIONATE 50 MCG
2 SPRAY, SUSPENSION (ML) NASAL DAILY
Qty: 16 G | Refills: 6 | Status: SHIPPED | OUTPATIENT
Start: 2025-04-21 | End: 2025-05-21

## 2025-04-21 NOTE — PATIENT INSTRUCTIONS
Continue injections and pumps    Flonase (= fluticasone) nasal spray:  2 squirts each nostril every day   Remember to aim out toward your ear.   Needs to be used regularly 5-14 days for full effect.    (You do not need to use the nos spray that starts with an A.)    Follow up in 3 months

## 2025-04-21 NOTE — LETTER
April 21, 2025        Rm Botello MD  [address] [address]             [facility]  [address]  [place] [address]  Phone: [phone]  Fax: [phone]   Patient: Saba Hansen   MR Number: [id number]   YOB: 1955   Date of Visit: 4/21/2025     Dear Dr. Botello,     I'm happy to report Mr Hansen's nasal polyps are in remission and his sense of smell has returned after 3 months on Dupixent.  I recommend continuing.    His asthma is under questionable control on Advair with no change since starting Dupixent.  I recommended complete PFTs to better assess his control.  I plan to see him back in 3 months.    Sincerely,      Emily Otto MD [facility] Van K. Nguyen, MD

## 2025-07-17 NOTE — PROGRESS NOTES
"Allergy Clinic Note  [facility]    This note was created by combination of typed  and dictation. Transcription errors are likely.  If there are any questions, please contact me.    HISTORY      Patient ID: Saba Hansen is a 70 y.o. male.    Chief Complaint: Nasal Polyps      Allergy problem list:    Recurrent nasal polyposis status post surgery x2  Persistent asthma  Chronic allergic rhinitis:  TGW > dog    History of the present illness      Saba Hansen is a 70 y.o. male with nasal polyposis treated with Dupixent and asthma returns to follow up respiratory symptoms at PFTs..  He is here alone, and he is a fair historian    Related medications and other interventions  Dupixent for nasal polyps 300 mg pen every 14 days since Jan 2025  Advair 230/21, 2 puffs twice daily  Nasal rinse with budesonide .5 mg  Flonase 2 sprays daily  Advair /21, 2 puffs twice daily -- regular use  Rescue MDI as needed     7/21/2025:  After six-month on Dupixent, client denies nasal congestion and says that sense of smell comes and goes.  He reports a "chest cold" lasting about 4 days a few weeks ago now back to baselines.  States compliance with Advair twice daily with spacer.  Reviewed PFTs showing mild obstruction and small airways dysfunction.  This has significantly improved compared to 2018.  Recommended continuing current Advair and concentrating on technique.  Follow up in 6 months or sooner if needed    4/21/25:  Reports that he is able to smell thing for the first time in 15 years.  Still complains of nasal congestion on the left.  No side effects from any medications.  States regular use of Advair.  Reports needing albuterol about twice, usually with talking too much or walking too much. ACT 12. Last PFTs 2018.  Ordered interval PFTs prior to next visit 3 months.  For nasal congestion, recommended using Flonase rather than Astelin.    2/26/25:  Since last visit, client began treatment Dupixent.  He " "notes decrease in nasal congestion attributed to the injections.  No side effects or other problems with the medicine.  He does report popping of his left ear.  He is currently alternating Astelin and Flonase.  I instructed him to use Flonase 2 squirts each nostril twice daily until they ear popping resolves.    Client admits asthma 2 to 3 times a week, with some limitations and occasional nocturnal awakenings. ACT 17.  He is currently using Advair 230/21, 2 puffs daily (prescribed twice daily).  I instructed him to increase to twice daily through the end of March.  Follow up April.    1/15/2025:  At initial visit, client reported 2 previous nasal and sinus surgeries.  Despite 2nd nasal/sinus surgery in 2024, bilateral nasal congestion/blocking rapidly recurred and his sense of smell is markedly diminished.  Most recent nasal endoscopy (11/14/2024) documents recurrence of nasal polyps bilaterally.       MEDICAL HISTORY      Significant past medical history: none  Active problem list reviewed  ENT surgery:  Nasal/sinus surgery 2024 and one previously; tonsillectomy    Smoking Hx:  Client  reports that he has never smoked. He has never used smokeless tobacco.    Meds: MAR reviewed    Asthma: Yes  Eczema: No  Rhinitis: Yes  Drug allergy/intolerance: RCM  Venom allergy:  No  Latex allergy:  No     REVIEW OF SYSTEMS      CONST: no F/C/NS, no unintentional weight changes  NEURO:  no tremor, no weakness  EYES: no discharge, no erythema  EARS: no hearing loss, no sensation of fullness  PULM:  no SOB, no wheezing, no cough  CV: no CP, no palpitations  DERM: no rashes, no skin breaks    PHYSICAL EXAM   /73 (BP Location: Left arm, Patient Position: Sitting)   Pulse 60   Ht 6' 1" (1.854 m)   Wt 85.1 kg (187 lb 9.8 oz)   SpO2 97%   BMI 24.75 kg/m²   GEN: Awake and alert, no distress  DERM:  No flushing, no rashes  EYE:  No ocular discharge, no redness  HENT: No nasal discharge, no hoarseness, TMs are translucent " bilaterally.  Nares are pink.  Nares are widely patent bilaterally.  No polypoid tissue seen. Oropharynx is benign without exudate.  Tongue is coated.  NECK:  No LAD  PULM: Normal work of breathing, no cough, CTA  COR:  RRR, normal capillary refill, normal radial pulse  NEURO:  No focal deficit, speech fluent and logical  PSYCH: appropriate affect, normal behavior      MEDICAL DECISION MAKING     Data reviewed:       (new entries in bold-face)      Allergy Testing     Inhalant Immunocaps positive to tree, grass, and weed pollen, 2025      Class III ragweed      Class II  Bermuda grass, Cedar, English plantain, pecan pollen      Class I   dog   All other aeroallergens less than 0.35 KU/L.  Mark grass 0.31.      Lab results   (new entries in bold face)      Total IgE 110, 2025  Eo count 1200, 2025,      Pulmonary Function Testing       PFTs (7/21/2025)   Adequate inspiratory and expiratory flow volume loops.  Spirometry shows mild obstruction.  FEV1/FVC 0.64.  Following bronchodilator, FEV1 improves 16%.  FEF 2570 558%  Normal TLC   Normal DLCO.    PFTs (05/03/2018)  FEV1 67% with 18% improvement following bronchodilator.  FEF 2570 560% consisted     Imaging and other diagnostics      Post op Nasal Endoscopy (11/14/2024, Dr. Botello, 7 months after most recent nasal/sinus surgery)  External:      No external nasal deformity   Intranasal:      Mucosa polyps      Mucosa ulcers not present      No mucosa lesions present      Enlarged turbinates      No septum gross deformity   Nasopharynx:      No mucosa lesions      Adenoids not present      Posterior choanae patent      Eustachian tube not patent      Grade 3 polyps on left      Grade 1 polyp on right   Scattered mucoid exudate, greater on left       Medical records review      Diagnoses:     Saba Hansen is a 70 y.o. male. with  1. Moderate persistent asthma, unspecified whether complicated    2. Nasal polyposis    3. Chronic allergic rhinitis    4. Allergic  rhinitis due to TGW pollen          Assessment / Plan   Client has a persistent asthma with PFT showing mild obstruction at baseline and excellent bronchodilator response.  I recommend continuing both Dupixent and Advair 230/21, 2 puffs twice daily with spacer.  Reviewed use.      On Dupixent for the last 6 months, nasal airway is now widely patent.  Client has history of recurrent nasal polyposis status post 2 previous nasal and sinus surgeries and also has a history of allergic rhinitis due to pollen.  Recommend continuing Dupixent and Flonase.  Follow-up in 6 months or sooner if needed.    Client also has allergic rhinitis due to pollen, and marked peripheral eosinophilia.      Nasal polyposis -- in remission on Dupixent   Continue dupilumab (DUPIXENT PEN) 300 every 14 days.     Chronic  allergic rhinitis due to TGW pollen   with Nasal turbinate hypertrophy -- much improved on Dupixent        Continue Flonase 2 squirts each nostril daily along with Dupixent    Moderate persistent asthma -- confirmed by PFTs        Continue Advair to twice daily with spacer, along with Dupixent        MA reviewed use of space        Continue albuterol as needed                Comorbidities  S/p nasal sinus surg x2  Eosinophilia (500-1500) -- probably due to atopy    INSTRUCTIONS AND FOLLOW-UP     Patient Instructions       Continue injections every 2 weeks     Continue purple inhaler with spacer: 2 puffs twice daily no matter what      Concentrate on taking as deep a breath as possible and holding for at least 10 seconds    Follow up in 6 months or sooner as needed    Follow up in about 6 months (around 1/21/2026).        Emily Otto MD  Allergy, Asthma & Immunology        I spent a total of 31 minutes on the day of the visit.This includes face to face time and non-face to face time preparing to see the patient (eg, review of tests), obtaining and/or reviewing separately obtained history, documenting clinical information in the  electronic or other health record, independently interpreting results and communicating results to the patient/family/caregiver, or care coordinator.

## 2025-07-21 ENCOUNTER — OFFICE VISIT (OUTPATIENT)
Dept: ALLERGY | Facility: CLINIC | Age: 70
End: 2025-07-21
Payer: MEDICARE

## 2025-07-21 ENCOUNTER — HOSPITAL ENCOUNTER (OUTPATIENT)
Dept: PULMONOLOGY | Facility: HOSPITAL | Age: 70
Discharge: HOME | End: 2025-07-21
Attending: STUDENT IN AN ORGANIZED HEALTH CARE EDUCATION/TRAINING PROGRAM
Payer: MEDICARE

## 2025-07-21 VITALS
BODY MASS INDEX: 24.87 KG/M2 | WEIGHT: 187.63 LBS | HEART RATE: 60 BPM | OXYGEN SATURATION: 97 % | DIASTOLIC BLOOD PRESSURE: 73 MMHG | HEIGHT: 73 IN | SYSTOLIC BLOOD PRESSURE: 117 MMHG

## 2025-07-21 DIAGNOSIS — J45.40 MODERATE PERSISTENT ASTHMA, UNSPECIFIED WHETHER COMPLICATED: ICD-10-CM

## 2025-07-21 DIAGNOSIS — J30.1 ALLERGIC RHINITIS DUE TO POLLEN, UNSPECIFIED SEASONALITY: ICD-10-CM

## 2025-07-21 DIAGNOSIS — J45.40 MODERATE PERSISTENT ASTHMA, UNSPECIFIED WHETHER COMPLICATED: Primary | ICD-10-CM

## 2025-07-21 DIAGNOSIS — J33.9 NASAL POLYPOSIS: ICD-10-CM

## 2025-07-21 DIAGNOSIS — J30.9 CHRONIC ALLERGIC RHINITIS: ICD-10-CM

## 2025-07-21 PROCEDURE — 99999 PR PBB SHADOW E&M-EST. PATIENT-LVL IV: CPT | Mod: PBBFAC,,, | Performed by: STUDENT IN AN ORGANIZED HEALTH CARE EDUCATION/TRAINING PROGRAM

## 2025-07-21 PROCEDURE — A4627 SPACER BAG/RESERVOIR: HCPCS | Mod: S$GLB,,, | Performed by: STUDENT IN AN ORGANIZED HEALTH CARE EDUCATION/TRAINING PROGRAM

## 2025-07-21 PROCEDURE — 94729 DIFFUSING CAPACITY: CPT | Mod: 26,,, | Performed by: INTERNAL MEDICINE

## 2025-07-21 PROCEDURE — 1101F PT FALLS ASSESS-DOCD LE1/YR: CPT | Mod: CPTII,S$GLB,, | Performed by: STUDENT IN AN ORGANIZED HEALTH CARE EDUCATION/TRAINING PROGRAM

## 2025-07-21 PROCEDURE — 1126F AMNT PAIN NOTED NONE PRSNT: CPT | Mod: CPTII,S$GLB,, | Performed by: STUDENT IN AN ORGANIZED HEALTH CARE EDUCATION/TRAINING PROGRAM

## 2025-07-21 PROCEDURE — 3078F DIAST BP <80 MM HG: CPT | Mod: CPTII,S$GLB,, | Performed by: STUDENT IN AN ORGANIZED HEALTH CARE EDUCATION/TRAINING PROGRAM

## 2025-07-21 PROCEDURE — 94726 PLETHYSMOGRAPHY LUNG VOLUMES: CPT

## 2025-07-21 PROCEDURE — 94060 EVALUATION OF WHEEZING: CPT

## 2025-07-21 PROCEDURE — 99214 OFFICE O/P EST MOD 30 MIN: CPT | Mod: S$GLB,,, | Performed by: STUDENT IN AN ORGANIZED HEALTH CARE EDUCATION/TRAINING PROGRAM

## 2025-07-21 PROCEDURE — 94729 DIFFUSING CAPACITY: CPT

## 2025-07-21 PROCEDURE — 3074F SYST BP LT 130 MM HG: CPT | Mod: CPTII,S$GLB,, | Performed by: STUDENT IN AN ORGANIZED HEALTH CARE EDUCATION/TRAINING PROGRAM

## 2025-07-21 PROCEDURE — 3008F BODY MASS INDEX DOCD: CPT | Mod: CPTII,S$GLB,, | Performed by: STUDENT IN AN ORGANIZED HEALTH CARE EDUCATION/TRAINING PROGRAM

## 2025-07-21 PROCEDURE — 3288F FALL RISK ASSESSMENT DOCD: CPT | Mod: CPTII,S$GLB,, | Performed by: STUDENT IN AN ORGANIZED HEALTH CARE EDUCATION/TRAINING PROGRAM

## 2025-07-21 PROCEDURE — 1159F MED LIST DOCD IN RCRD: CPT | Mod: CPTII,S$GLB,, | Performed by: STUDENT IN AN ORGANIZED HEALTH CARE EDUCATION/TRAINING PROGRAM

## 2025-07-21 PROCEDURE — 94060 EVALUATION OF WHEEZING: CPT | Mod: 26,,, | Performed by: INTERNAL MEDICINE

## 2025-07-21 PROCEDURE — 1160F RVW MEDS BY RX/DR IN RCRD: CPT | Mod: CPTII,S$GLB,, | Performed by: STUDENT IN AN ORGANIZED HEALTH CARE EDUCATION/TRAINING PROGRAM

## 2025-07-21 PROCEDURE — 94726 PLETHYSMOGRAPHY LUNG VOLUMES: CPT | Mod: 26,,, | Performed by: INTERNAL MEDICINE

## 2025-07-21 RX ORDER — FLUTICASONE PROPIONATE 50 MCG
2 SPRAY, SUSPENSION (ML) NASAL 2 TIMES DAILY
COMMUNITY
Start: 2025-07-14

## 2025-07-21 NOTE — PATIENT INSTRUCTIONS
Continue injections every 2 weeks     Continue purple inhaler with spacer: 2 puffs twice daily no matter what      Concentrate on taking as deep a breath as possible and holding for at least 10 seconds    Follow up in 6 months or sooner as needed

## 2025-08-19 DIAGNOSIS — N40.1 BPH WITH URINARY OBSTRUCTION: ICD-10-CM

## 2025-08-19 DIAGNOSIS — N13.8 BPH WITH URINARY OBSTRUCTION: ICD-10-CM

## 2025-08-19 DIAGNOSIS — J30.89 ALLERGIC RHINITIS DUE TO OTHER ALLERGIC TRIGGER, UNSPECIFIED SEASONALITY: ICD-10-CM

## 2025-08-20 RX ORDER — MONTELUKAST SODIUM 10 MG/1
TABLET ORAL
Qty: 90 TABLET | Refills: 0 | Status: SHIPPED | OUTPATIENT
Start: 2025-08-20

## 2025-08-20 RX ORDER — TAMSULOSIN HYDROCHLORIDE 0.4 MG/1
CAPSULE ORAL
Qty: 90 CAPSULE | Refills: 0 | Status: SHIPPED | OUTPATIENT
Start: 2025-08-20

## (undated) DEVICE — SPONGE PATTY SURGICAL .5X3IN

## (undated) DEVICE — ELECTRODE REM PLYHSV RETURN 9

## (undated) DEVICE — SUCTION COAGULATOR 10FR 6IN

## (undated) DEVICE — SEE MEDLINE ITEM 156913

## (undated) DEVICE — POWDER ARISTA AH 3G

## (undated) DEVICE — CATH ALL PUR URTHL RR 10FR

## (undated) DEVICE — ELECTRODE BLADE INSULATED 1 IN

## (undated) DEVICE — APPLICATOR ARISTA FLEX XL

## (undated) DEVICE — SUT 4-0 CHROMIC GUT / RB1

## (undated) DEVICE — GLOVE SENSICARE PI MICRO 7.5

## (undated) DEVICE — SHEET EENT SPLIT

## (undated) DEVICE — CONTAINER SPECIMEN OR STER 4OZ

## (undated) DEVICE — GOWN SMART IMP BREATHABLE XXLG

## (undated) DEVICE — TRACKER PATIENT NON INVASIVE

## (undated) DEVICE — SYR LUER LOCK 1CC

## (undated) DEVICE — TOWEL OR DISP STRL BLUE 4/PK

## (undated) DEVICE — Device

## (undated) DEVICE — SYR 50CC LL

## (undated) DEVICE — SPONGE TONSIL LARGE

## (undated) DEVICE — PENCIL ROCKER SWITCH 10FT CORD

## (undated) DEVICE — SEE MEDLINE ITEM 146417

## (undated) DEVICE — CONTAINER SPECIMEN STRL 4OZ

## (undated) DEVICE — DUOVISC

## (undated) DEVICE — SOL BETADINE 5%

## (undated) DEVICE — SHEILD & GARTERS FOX METAL EYE

## (undated) DEVICE — SYR SLIP TIP 1CC

## (undated) DEVICE — TRAY ENT 4/CS

## (undated) DEVICE — SEE MEDLINE ITEM 152622

## (undated) DEVICE — BLADE SCALP OPHTL RND TIP

## (undated) DEVICE — GLOVE BIOGEL SKINSENSE PI 7.0

## (undated) DEVICE — SOL IRR STRL WATER 500ML

## (undated) DEVICE — DRESSING TELFA STRL 4X3 LF

## (undated) DEVICE — SEE MEDLINE ITEM 146313

## (undated) DEVICE — COVER MAYO STND XL 30X57IN

## (undated) DEVICE — SUT PLAIN 4-0 SC-1 18IN

## (undated) DEVICE — KIT ANTIFOG

## (undated) DEVICE — DRESSING TELFA N ADH 3X8

## (undated) DEVICE — BLADE SURG BVL ANG COAX 2.4MM

## (undated) DEVICE — BLADE INFERIOR TURBINATE 5/PK

## (undated) DEVICE — BLADE TRICUT

## (undated) DEVICE — WARMER DRAPE STERILE LF

## (undated) DEVICE — DRAPE INSTR MAGNETIC 10X16IN

## (undated) DEVICE — DRAPE CORETEMP FLD WRM 56X62IN

## (undated) DEVICE — KIT SINUS OMC

## (undated) DEVICE — DRESSING SURGICAL 1X3

## (undated) DEVICE — CATH IV INTROCAN 14G X 2.

## (undated) DEVICE — TUBING XPS IRRIG TO STRAIGHTSH

## (undated) DEVICE — TRACKER ENT INSTRUMENT

## (undated) DEVICE — PAD CURAD NONADH 3X4IN